# Patient Record
Sex: FEMALE | Race: WHITE | NOT HISPANIC OR LATINO | Employment: FULL TIME | URBAN - METROPOLITAN AREA
[De-identification: names, ages, dates, MRNs, and addresses within clinical notes are randomized per-mention and may not be internally consistent; named-entity substitution may affect disease eponyms.]

---

## 2020-05-01 ENCOUNTER — TELEMEDICINE (OUTPATIENT)
Dept: OBGYN CLINIC | Facility: CLINIC | Age: 30
End: 2020-05-01
Payer: COMMERCIAL

## 2020-05-01 VITALS — WEIGHT: 155 LBS | HEIGHT: 65 IN | BODY MASS INDEX: 25.83 KG/M2

## 2020-05-01 DIAGNOSIS — N97.9 INFERTILITY, FEMALE, SECONDARY: Primary | ICD-10-CM

## 2020-05-01 PROCEDURE — 99213 OFFICE O/P EST LOW 20 MIN: CPT | Performed by: OBSTETRICS & GYNECOLOGY

## 2020-08-12 ENCOUNTER — OFFICE VISIT (OUTPATIENT)
Dept: OBGYN CLINIC | Facility: CLINIC | Age: 30
End: 2020-08-12
Payer: COMMERCIAL

## 2020-08-12 VITALS
BODY MASS INDEX: 26.66 KG/M2 | TEMPERATURE: 97.9 F | HEIGHT: 65 IN | SYSTOLIC BLOOD PRESSURE: 100 MMHG | DIASTOLIC BLOOD PRESSURE: 80 MMHG | WEIGHT: 160 LBS

## 2020-08-12 DIAGNOSIS — N93.0 PCB (POST COITAL BLEEDING): ICD-10-CM

## 2020-08-12 DIAGNOSIS — N92.6 IRREGULAR MENSES: Primary | ICD-10-CM

## 2020-08-12 DIAGNOSIS — N93.9 ABNORMAL UTERINE BLEEDING: ICD-10-CM

## 2020-08-12 DIAGNOSIS — N89.8 VAGINAL DISCHARGE: ICD-10-CM

## 2020-08-12 DIAGNOSIS — R63.5 WEIGHT GAIN: ICD-10-CM

## 2020-08-12 PROCEDURE — 87480 CANDIDA DNA DIR PROBE: CPT | Performed by: OBSTETRICS & GYNECOLOGY

## 2020-08-12 PROCEDURE — 99213 OFFICE O/P EST LOW 20 MIN: CPT | Performed by: OBSTETRICS & GYNECOLOGY

## 2020-08-12 PROCEDURE — 87109 MYCOPLASMA: CPT | Performed by: OBSTETRICS & GYNECOLOGY

## 2020-08-12 PROCEDURE — 87660 TRICHOMONAS VAGIN DIR PROBE: CPT | Performed by: OBSTETRICS & GYNECOLOGY

## 2020-08-12 PROCEDURE — 87510 GARDNER VAG DNA DIR PROBE: CPT | Performed by: OBSTETRICS & GYNECOLOGY

## 2020-08-12 NOTE — PROGRESS NOTES
Assessment/Plan:  39-year-old G2 P 2 female presents complaining of bleeding after intercourse  She is also having some irregular bleeding with her menses  She is presently trying to get pregnant for the past 3 months with timed intercourse  Exam is normal  Vaginitis panel performed today  Check lab work  Follow-up and treat according to results       Diagnoses and all orders for this visit:    Irregular menses  -     CBC; Future  -     Glucose, fasting; Future  -     Hemoglobin A1C; Future  -     TSH, 3rd generation; Future  -     Follicle stimulating hormone; Future  -     Luteinizing hormone; Future  -     Prolactin; Future  -     hCG, quantitative; Future    Weight gain  -     Glucose, fasting; Future  -     Hemoglobin A1C; Future    PCB (post coital bleeding)    Abnormal uterine bleeding          Subjective:      Patient ID: Halie Castellanos is a 31 y o  female  39-year-old  2 para 2 female last menstrual period 2020 presents with postcoital bleeding as well as irregular menses  Patient is presently trying to conceive for the last 3 months  The following portions of the patient's history were reviewed and updated as appropriate: allergies, current medications, past family history, past medical history, past social history, past surgical history and problem list     Review of Systems   Constitutional: Negative for fatigue and fever  Gastrointestinal: Negative for abdominal pain  Genitourinary: Positive for menstrual problem (Irregular bleeding)  Negative for difficulty urinating, dyspareunia ( post coital bleeding), dysuria, frequency, genital sores, pelvic pain, urgency, vaginal bleeding, vaginal discharge and vaginal pain           Objective:      /80 (BP Location: Left arm, Patient Position: Sitting, Cuff Size: Adult)   Temp 97 9 °F (36 6 °C)   Ht 5' 5" (1 651 m)   Wt 72 6 kg (160 lb)   LMP 2020   BMI 26 63 kg/m²          Physical Exam  Vitals signs and nursing note reviewed  Exam conducted with a chaperone present  Constitutional:       Appearance: Normal appearance  Neck:      Musculoskeletal: Neck supple  Genitourinary:     General: Normal vulva  Vagina: Normal  No vaginal discharge  Cervix: Normal       Uterus: Normal        Adnexa: Right adnexa normal and left adnexa normal       Rectum: Normal    Lymphadenopathy:      Lower Body: No right inguinal adenopathy  No left inguinal adenopathy  Skin:     General: Skin is warm and dry  Neurological:      Mental Status: She is alert  Psychiatric:         Mood and Affect: Mood normal          Behavior: Behavior normal          Thought Content:  Thought content normal          Judgment: Judgment normal

## 2020-08-14 LAB
CANDIDA RRNA VAG QL PROBE: NEGATIVE
G VAGINALIS RRNA GENITAL QL PROBE: NEGATIVE
T VAGINALIS RRNA GENITAL QL PROBE: NEGATIVE

## 2020-08-15 ENCOUNTER — LAB (OUTPATIENT)
Dept: LAB | Facility: MEDICAL CENTER | Age: 30
End: 2020-08-15
Payer: COMMERCIAL

## 2020-08-15 DIAGNOSIS — N92.6 IRREGULAR MENSES: ICD-10-CM

## 2020-08-15 DIAGNOSIS — R63.5 WEIGHT GAIN: ICD-10-CM

## 2020-08-15 LAB
B-HCG SERPL-ACNC: <2 MIU/ML
ERYTHROCYTE [DISTWIDTH] IN BLOOD BY AUTOMATED COUNT: 12 % (ref 11.6–15.1)
EST. AVERAGE GLUCOSE BLD GHB EST-MCNC: 91 MG/DL
FSH SERPL-ACNC: 3.4 MIU/ML
GLUCOSE P FAST SERPL-MCNC: 83 MG/DL (ref 65–99)
HBA1C MFR BLD: 4.8 %
HCT VFR BLD AUTO: 39.9 % (ref 34.8–46.1)
HGB BLD-MCNC: 13.3 G/DL (ref 11.5–15.4)
LH SERPL-ACNC: 7 MIU/ML
MCH RBC QN AUTO: 29.8 PG (ref 26.8–34.3)
MCHC RBC AUTO-ENTMCNC: 33.3 G/DL (ref 31.4–37.4)
MCV RBC AUTO: 89 FL (ref 82–98)
PLATELET # BLD AUTO: 400 THOUSANDS/UL (ref 149–390)
PMV BLD AUTO: 9 FL (ref 8.9–12.7)
PROLACTIN SERPL-MCNC: 16.9 NG/ML
RBC # BLD AUTO: 4.47 MILLION/UL (ref 3.81–5.12)
TSH SERPL DL<=0.05 MIU/L-ACNC: 1.83 UIU/ML (ref 0.36–3.74)
WBC # BLD AUTO: 5.21 THOUSAND/UL (ref 4.31–10.16)

## 2020-08-15 PROCEDURE — 84443 ASSAY THYROID STIM HORMONE: CPT

## 2020-08-15 PROCEDURE — 84146 ASSAY OF PROLACTIN: CPT

## 2020-08-15 PROCEDURE — 36415 COLL VENOUS BLD VENIPUNCTURE: CPT

## 2020-08-15 PROCEDURE — 84702 CHORIONIC GONADOTROPIN TEST: CPT

## 2020-08-15 PROCEDURE — 83002 ASSAY OF GONADOTROPIN (LH): CPT

## 2020-08-15 PROCEDURE — 85027 COMPLETE CBC AUTOMATED: CPT

## 2020-08-15 PROCEDURE — 83036 HEMOGLOBIN GLYCOSYLATED A1C: CPT

## 2020-08-15 PROCEDURE — 83001 ASSAY OF GONADOTROPIN (FSH): CPT

## 2020-08-15 PROCEDURE — 82947 ASSAY GLUCOSE BLOOD QUANT: CPT

## 2020-08-18 LAB
M HOMINIS SPEC QL CULT: NEGATIVE
U UREALYTICUM SPEC QL CULT: NEGATIVE

## 2020-10-05 DIAGNOSIS — N91.2 AMENORRHEA: Primary | ICD-10-CM

## 2020-10-06 ENCOUNTER — LAB (OUTPATIENT)
Dept: LAB | Facility: CLINIC | Age: 30
End: 2020-10-06
Payer: COMMERCIAL

## 2020-10-06 DIAGNOSIS — N91.2 AMENORRHEA: ICD-10-CM

## 2020-10-06 LAB — B-HCG SERPL-ACNC: 5055 MIU/ML

## 2020-10-06 PROCEDURE — 36415 COLL VENOUS BLD VENIPUNCTURE: CPT

## 2020-10-06 PROCEDURE — 84144 ASSAY OF PROGESTERONE: CPT

## 2020-10-06 PROCEDURE — 84702 CHORIONIC GONADOTROPIN TEST: CPT

## 2020-10-07 LAB — PROGEST SERPL-MCNC: 17.8 NG/ML

## 2020-10-20 ENCOUNTER — INITIAL PRENATAL (OUTPATIENT)
Dept: OBGYN CLINIC | Facility: CLINIC | Age: 30
End: 2020-10-20
Payer: COMMERCIAL

## 2020-10-20 ENCOUNTER — TELEPHONE (OUTPATIENT)
Dept: PERINATAL CARE | Facility: CLINIC | Age: 30
End: 2020-10-20

## 2020-10-20 VITALS
HEIGHT: 65 IN | TEMPERATURE: 98.2 F | DIASTOLIC BLOOD PRESSURE: 70 MMHG | SYSTOLIC BLOOD PRESSURE: 110 MMHG | HEART RATE: 80 BPM | WEIGHT: 169.8 LBS | RESPIRATION RATE: 16 BRPM | BODY MASS INDEX: 28.29 KG/M2

## 2020-10-20 DIAGNOSIS — Z34.82 ENCOUNTER FOR SUPERVISION OF OTHER NORMAL PREGNANCY, SECOND TRIMESTER: ICD-10-CM

## 2020-10-20 DIAGNOSIS — O24.319 PREGESTATIONAL DIABETES MELLITUS, MODIFIED WHITE CLASS B: ICD-10-CM

## 2020-10-20 DIAGNOSIS — O36.80X0 PREGNANCY WITH UNCERTAIN FETAL VIABILITY, SINGLE OR UNSPECIFIED FETUS: ICD-10-CM

## 2020-10-20 DIAGNOSIS — Z33.1 NORMAL PREGNANCY, INCIDENTAL: Primary | ICD-10-CM

## 2020-10-20 DIAGNOSIS — Z34.81 MULTIGRAVIDA IN FIRST TRIMESTER: ICD-10-CM

## 2020-10-20 PROCEDURE — 99211 OFF/OP EST MAY X REQ PHY/QHP: CPT

## 2020-10-29 ENCOUNTER — HOSPITAL ENCOUNTER (OUTPATIENT)
Dept: RADIOLOGY | Facility: MEDICAL CENTER | Age: 30
Discharge: HOME | End: 2020-10-29
Payer: COMMERCIAL

## 2020-10-29 DIAGNOSIS — O36.80X0 PREGNANCY WITH UNCERTAIN FETAL VIABILITY, SINGLE OR UNSPECIFIED FETUS: ICD-10-CM

## 2020-10-29 PROCEDURE — 76801 OB US < 14 WKS SINGLE FETUS: CPT

## 2020-10-30 ENCOUNTER — TELEMEDICINE (OUTPATIENT)
Dept: PERINATAL CARE | Facility: CLINIC | Age: 30
End: 2020-10-30

## 2020-10-30 DIAGNOSIS — Z31.5 ENCOUNTER FOR PROCREATIVE GENETIC COUNSELING: ICD-10-CM

## 2020-10-30 DIAGNOSIS — O35.2XX0 HEREDITARY DISEASE IN FAMILY POSSIBLY AFFECTING FETUS, AFFECTING MANAGEMENT OF MOTHER IN PREGNANCY, SINGLE OR UNSPECIFIED FETUS: Primary | ICD-10-CM

## 2020-10-30 PROCEDURE — NC001 PR NO CHARGE

## 2020-11-01 ENCOUNTER — LAB (OUTPATIENT)
Dept: LAB | Facility: CLINIC | Age: 30
End: 2020-11-01
Payer: COMMERCIAL

## 2020-11-01 DIAGNOSIS — O24.319 PREGESTATIONAL DIABETES MELLITUS, MODIFIED WHITE CLASS B: ICD-10-CM

## 2020-11-01 DIAGNOSIS — Z33.1 NORMAL PREGNANCY, INCIDENTAL: ICD-10-CM

## 2020-11-01 LAB
ABO GROUP BLD: NORMAL
BASOPHILS # BLD AUTO: 0.03 THOUSANDS/ΜL (ref 0–0.1)
BASOPHILS NFR BLD AUTO: 0 % (ref 0–1)
BILIRUB UR QL STRIP: NEGATIVE
BLD GP AB SCN SERPL QL: NEGATIVE
CLARITY UR: CLEAR
COLOR UR: YELLOW
EOSINOPHIL # BLD AUTO: 0.09 THOUSAND/ΜL (ref 0–0.61)
EOSINOPHIL NFR BLD AUTO: 1 % (ref 0–6)
ERYTHROCYTE [DISTWIDTH] IN BLOOD BY AUTOMATED COUNT: 12.7 % (ref 11.6–15.1)
GLUCOSE 1H P 50 G GLC PO SERPL-MCNC: 79 MG/DL
GLUCOSE UR STRIP-MCNC: NEGATIVE MG/DL
HBV SURFACE AG SER QL: NORMAL
HCT VFR BLD AUTO: 38.2 % (ref 34.8–46.1)
HGB BLD-MCNC: 12.6 G/DL (ref 11.5–15.4)
HGB UR QL STRIP.AUTO: NEGATIVE
IMM GRANULOCYTES # BLD AUTO: 0.04 THOUSAND/UL (ref 0–0.2)
IMM GRANULOCYTES NFR BLD AUTO: 1 % (ref 0–2)
KETONES UR STRIP-MCNC: NEGATIVE MG/DL
LEUKOCYTE ESTERASE UR QL STRIP: NEGATIVE
LYMPHOCYTES # BLD AUTO: 1.32 THOUSANDS/ΜL (ref 0.6–4.47)
LYMPHOCYTES NFR BLD AUTO: 18 % (ref 14–44)
MCH RBC QN AUTO: 29.3 PG (ref 26.8–34.3)
MCHC RBC AUTO-ENTMCNC: 33 G/DL (ref 31.4–37.4)
MCV RBC AUTO: 89 FL (ref 82–98)
MONOCYTES # BLD AUTO: 0.48 THOUSAND/ΜL (ref 0.17–1.22)
MONOCYTES NFR BLD AUTO: 6 % (ref 4–12)
NEUTROPHILS # BLD AUTO: 5.58 THOUSANDS/ΜL (ref 1.85–7.62)
NEUTS SEG NFR BLD AUTO: 74 % (ref 43–75)
NITRITE UR QL STRIP: NEGATIVE
NRBC BLD AUTO-RTO: 0 /100 WBCS
PH UR STRIP.AUTO: 7 [PH]
PLATELET # BLD AUTO: 351 THOUSANDS/UL (ref 149–390)
PMV BLD AUTO: 8.7 FL (ref 8.9–12.7)
PROT UR STRIP-MCNC: NEGATIVE MG/DL
RBC # BLD AUTO: 4.3 MILLION/UL (ref 3.81–5.12)
RH BLD: POSITIVE
RUBV IGG SERPL IA-ACNC: >175 IU/ML
SP GR UR STRIP.AUTO: 1.01 (ref 1–1.03)
SPECIMEN EXPIRATION DATE: NORMAL
UROBILINOGEN UR QL STRIP.AUTO: 0.2 E.U./DL
WBC # BLD AUTO: 7.54 THOUSAND/UL (ref 4.31–10.16)

## 2020-11-01 PROCEDURE — 86787 VARICELLA-ZOSTER ANTIBODY: CPT

## 2020-11-01 PROCEDURE — 82950 GLUCOSE TEST: CPT

## 2020-11-01 PROCEDURE — 36415 COLL VENOUS BLD VENIPUNCTURE: CPT

## 2020-11-01 PROCEDURE — 81003 URINALYSIS AUTO W/O SCOPE: CPT

## 2020-11-01 PROCEDURE — 80081 OBSTETRIC PANEL INC HIV TSTG: CPT

## 2020-11-01 PROCEDURE — 81220 CFTR GENE COM VARIANTS: CPT

## 2020-11-01 PROCEDURE — 83020 HEMOGLOBIN ELECTROPHORESIS: CPT

## 2020-11-01 PROCEDURE — 87086 URINE CULTURE/COLONY COUNT: CPT

## 2020-11-01 PROCEDURE — 81401 MOPATH PROCEDURE LEVEL 2: CPT

## 2020-11-02 ENCOUNTER — INITIAL PRENATAL (OUTPATIENT)
Dept: OBGYN CLINIC | Facility: CLINIC | Age: 30
End: 2020-11-02
Payer: COMMERCIAL

## 2020-11-02 VITALS
BODY MASS INDEX: 28.82 KG/M2 | WEIGHT: 173 LBS | SYSTOLIC BLOOD PRESSURE: 106 MMHG | TEMPERATURE: 97.8 F | HEIGHT: 65 IN | DIASTOLIC BLOOD PRESSURE: 62 MMHG

## 2020-11-02 DIAGNOSIS — Z3A.08 8 WEEKS GESTATION OF PREGNANCY: Primary | ICD-10-CM

## 2020-11-02 DIAGNOSIS — Z3A.09 9 WEEKS GESTATION OF PREGNANCY: ICD-10-CM

## 2020-11-02 LAB
HIV 1+2 AB+HIV1 P24 AG SERPL QL IA: NORMAL
RPR SER QL: NORMAL
SL AMB  POCT GLUCOSE, UA: NORMAL
SL AMB LEUKOCYTE ESTERASE,UA: NORMAL
SL AMB POCT BLOOD,UA: NORMAL
SL AMB POCT NITRITE,UA: NORMAL
SL AMB POCT URINE PROTEIN: NORMAL

## 2020-11-02 PROCEDURE — 87491 CHLMYD TRACH DNA AMP PROBE: CPT | Performed by: OBSTETRICS & GYNECOLOGY

## 2020-11-02 PROCEDURE — 90471 IMMUNIZATION ADMIN: CPT | Performed by: OBSTETRICS & GYNECOLOGY

## 2020-11-02 PROCEDURE — 87591 N.GONORRHOEAE DNA AMP PROB: CPT | Performed by: OBSTETRICS & GYNECOLOGY

## 2020-11-02 PROCEDURE — 90686 IIV4 VACC NO PRSV 0.5 ML IM: CPT | Performed by: OBSTETRICS & GYNECOLOGY

## 2020-11-02 PROCEDURE — PNV: Performed by: OBSTETRICS & GYNECOLOGY

## 2020-11-03 ENCOUNTER — OB ABSTRACT (OUTPATIENT)
Dept: OBGYN CLINIC | Facility: CLINIC | Age: 30
End: 2020-11-03

## 2020-11-03 LAB
BACTERIA UR CULT: NORMAL
C TRACH DNA SPEC QL NAA+PROBE: NEGATIVE
DEPRECATED HGB OTHER BLD-IMP: 0 %
HGB A MFR BLD: 2.2 % (ref 1.8–3.2)
HGB A MFR BLD: 97.8 % (ref 96.4–98.8)
HGB C MFR BLD: 0 %
HGB F MFR BLD: 0 % (ref 0–2)
HGB FRACT BLD-IMP: NORMAL
HGB S BLD QL SOLY: NEGATIVE
HGB S MFR BLD: 0 %
N GONORRHOEA DNA SPEC QL NAA+PROBE: NEGATIVE
VZV IGG SER IA-ACNC: NORMAL

## 2020-11-06 LAB
CLINICAL INFO: NORMAL
ETHNIC BACKGROUND STATED: NORMAL
GENE MUT TESTED BLD/T: NORMAL
GENERAL COMMENTS:: NORMAL
LAB DIRECTOR NAME PROVIDER: NORMAL
REASON FOR REFERRAL (NARRATIVE): NORMAL
REF LAB TEST METHOD: NORMAL
SL AMB DISCLAIMER: NORMAL
SL AMB GENETIC COUNSELOR: NORMAL
SMN1 GENE MUT ANL BLD/T: NORMAL
SPECIMEN SOURCE: NORMAL

## 2020-11-09 LAB
CF COMMENT: NORMAL
CFTR MUT ANL BLD/T: NORMAL

## 2020-11-12 ENCOUNTER — TELEPHONE (OUTPATIENT)
Dept: OBGYN CLINIC | Facility: CLINIC | Age: 30
End: 2020-11-12

## 2020-11-12 ENCOUNTER — TELEPHONE (OUTPATIENT)
Dept: FAMILY MEDICINE CLINIC | Facility: CLINIC | Age: 30
End: 2020-11-12

## 2020-11-12 DIAGNOSIS — Z20.822 EXPOSURE TO COVID-19 VIRUS: ICD-10-CM

## 2020-11-12 DIAGNOSIS — Z20.822 EXPOSURE TO COVID-19 VIRUS: Primary | ICD-10-CM

## 2020-11-12 PROCEDURE — U0003 INFECTIOUS AGENT DETECTION BY NUCLEIC ACID (DNA OR RNA); SEVERE ACUTE RESPIRATORY SYNDROME CORONAVIRUS 2 (SARS-COV-2) (CORONAVIRUS DISEASE [COVID-19]), AMPLIFIED PROBE TECHNIQUE, MAKING USE OF HIGH THROUGHPUT TECHNOLOGIES AS DESCRIBED BY CMS-2020-01-R: HCPCS | Performed by: FAMILY MEDICINE

## 2020-11-14 LAB — SARS-COV-2 RNA SPEC QL NAA+PROBE: NOT DETECTED

## 2020-11-16 ENCOUNTER — TELEPHONE (OUTPATIENT)
Dept: FAMILY MEDICINE CLINIC | Facility: CLINIC | Age: 30
End: 2020-11-16

## 2020-11-23 ENCOUNTER — DOCUMENTATION (OUTPATIENT)
Dept: PERINATAL CARE | Facility: CLINIC | Age: 30
End: 2020-11-23

## 2020-11-28 ENCOUNTER — NURSE TRIAGE (OUTPATIENT)
Dept: OTHER | Facility: OTHER | Age: 30
End: 2020-11-28

## 2020-11-28 ENCOUNTER — TELEPHONE (OUTPATIENT)
Dept: OTHER | Facility: OTHER | Age: 30
End: 2020-11-28

## 2020-11-28 PROCEDURE — 99283 EMERGENCY DEPT VISIT LOW MDM: CPT

## 2020-11-29 ENCOUNTER — HOSPITAL ENCOUNTER (EMERGENCY)
Facility: HOSPITAL | Age: 30
Discharge: HOME | End: 2020-11-29
Attending: EMERGENCY MEDICINE | Admitting: EMERGENCY MEDICINE
Payer: COMMERCIAL

## 2020-11-29 VITALS
DIASTOLIC BLOOD PRESSURE: 60 MMHG | HEART RATE: 95 BPM | RESPIRATION RATE: 16 BRPM | TEMPERATURE: 99.5 F | OXYGEN SATURATION: 97 % | SYSTOLIC BLOOD PRESSURE: 100 MMHG

## 2020-11-29 DIAGNOSIS — R11.2 NAUSEA AND VOMITING: ICD-10-CM

## 2020-11-29 DIAGNOSIS — G43.009 ATYPICAL MIGRAINE: Primary | ICD-10-CM

## 2020-11-29 LAB
ALBUMIN SERPL BCP-MCNC: 3 G/DL (ref 3.5–5)
ALP SERPL-CCNC: 72 U/L (ref 46–116)
ALT SERPL W P-5'-P-CCNC: 27 U/L (ref 12–78)
ANION GAP SERPL CALCULATED.3IONS-SCNC: 5 MMOL/L (ref 4–13)
AST SERPL W P-5'-P-CCNC: 13 U/L (ref 5–45)
BASOPHILS # BLD AUTO: 0.03 THOUSANDS/ΜL (ref 0–0.1)
BASOPHILS NFR BLD AUTO: 0 % (ref 0–1)
BILIRUB SERPL-MCNC: 0.23 MG/DL (ref 0.2–1)
BILIRUB UR QL STRIP: NEGATIVE
BUN SERPL-MCNC: 10 MG/DL (ref 5–25)
CALCIUM ALBUM COR SERPL-MCNC: 9.7 MG/DL (ref 8.3–10.1)
CALCIUM SERPL-MCNC: 8.9 MG/DL (ref 8.3–10.1)
CHLORIDE SERPL-SCNC: 103 MMOL/L (ref 100–108)
CLARITY UR: NORMAL
CO2 SERPL-SCNC: 27 MMOL/L (ref 21–32)
COLOR UR: YELLOW
CREAT SERPL-MCNC: 0.64 MG/DL (ref 0.6–1.3)
EOSINOPHIL # BLD AUTO: 0.05 THOUSAND/ΜL (ref 0–0.61)
EOSINOPHIL NFR BLD AUTO: 0 % (ref 0–6)
ERYTHROCYTE [DISTWIDTH] IN BLOOD BY AUTOMATED COUNT: 12.9 % (ref 11.6–15.1)
GFR SERPL CREATININE-BSD FRML MDRD: 119 ML/MIN/1.73SQ M
GLUCOSE SERPL-MCNC: 114 MG/DL (ref 65–140)
GLUCOSE UR STRIP-MCNC: NEGATIVE MG/DL
HCT VFR BLD AUTO: 37.7 % (ref 34.8–46.1)
HGB BLD-MCNC: 12.6 G/DL (ref 11.5–15.4)
HGB UR QL STRIP.AUTO: NEGATIVE
IMM GRANULOCYTES # BLD AUTO: 0.08 THOUSAND/UL (ref 0–0.2)
IMM GRANULOCYTES NFR BLD AUTO: 1 % (ref 0–2)
KETONES UR STRIP-MCNC: NEGATIVE MG/DL
LEUKOCYTE ESTERASE UR QL STRIP: NEGATIVE
LYMPHOCYTES # BLD AUTO: 1.27 THOUSANDS/ΜL (ref 0.6–4.47)
LYMPHOCYTES NFR BLD AUTO: 10 % (ref 14–44)
MCH RBC QN AUTO: 29.6 PG (ref 26.8–34.3)
MCHC RBC AUTO-ENTMCNC: 33.4 G/DL (ref 31.4–37.4)
MCV RBC AUTO: 89 FL (ref 82–98)
MONOCYTES # BLD AUTO: 0.47 THOUSAND/ΜL (ref 0.17–1.22)
MONOCYTES NFR BLD AUTO: 4 % (ref 4–12)
NEUTROPHILS # BLD AUTO: 10.48 THOUSANDS/ΜL (ref 1.85–7.62)
NEUTS SEG NFR BLD AUTO: 85 % (ref 43–75)
NITRITE UR QL STRIP: NEGATIVE
NRBC BLD AUTO-RTO: 0 /100 WBCS
PH UR STRIP.AUTO: 7.5 [PH]
PLATELET # BLD AUTO: 359 THOUSANDS/UL (ref 149–390)
PMV BLD AUTO: 8.8 FL (ref 8.9–12.7)
POTASSIUM SERPL-SCNC: 3.5 MMOL/L (ref 3.5–5.3)
PROT SERPL-MCNC: 6.6 G/DL (ref 6.4–8.2)
PROT UR STRIP-MCNC: NEGATIVE MG/DL
RBC # BLD AUTO: 4.25 MILLION/UL (ref 3.81–5.12)
SODIUM SERPL-SCNC: 135 MMOL/L (ref 136–145)
SP GR UR STRIP.AUTO: 1.02 (ref 1–1.03)
UROBILINOGEN UR QL STRIP.AUTO: 0.2 E.U./DL
WBC # BLD AUTO: 12.38 THOUSAND/UL (ref 4.31–10.16)

## 2020-11-29 PROCEDURE — 80053 COMPREHEN METABOLIC PANEL: CPT | Performed by: EMERGENCY MEDICINE

## 2020-11-29 PROCEDURE — 85025 COMPLETE CBC W/AUTO DIFF WBC: CPT | Performed by: EMERGENCY MEDICINE

## 2020-11-29 PROCEDURE — 96365 THER/PROPH/DIAG IV INF INIT: CPT

## 2020-11-29 PROCEDURE — 99284 EMERGENCY DEPT VISIT MOD MDM: CPT | Performed by: EMERGENCY MEDICINE

## 2020-11-29 PROCEDURE — 36415 COLL VENOUS BLD VENIPUNCTURE: CPT | Performed by: EMERGENCY MEDICINE

## 2020-11-29 PROCEDURE — 96375 TX/PRO/DX INJ NEW DRUG ADDON: CPT

## 2020-11-29 PROCEDURE — 81003 URINALYSIS AUTO W/O SCOPE: CPT | Performed by: EMERGENCY MEDICINE

## 2020-11-29 PROCEDURE — 87086 URINE CULTURE/COLONY COUNT: CPT | Performed by: EMERGENCY MEDICINE

## 2020-11-29 RX ORDER — DIPHENHYDRAMINE HYDROCHLORIDE 50 MG/ML
25 INJECTION INTRAMUSCULAR; INTRAVENOUS ONCE
Status: COMPLETED | OUTPATIENT
Start: 2020-11-29 | End: 2020-11-29

## 2020-11-29 RX ORDER — MAGNESIUM SULFATE HEPTAHYDRATE 40 MG/ML
2 INJECTION, SOLUTION INTRAVENOUS ONCE
Status: COMPLETED | OUTPATIENT
Start: 2020-11-29 | End: 2020-11-29

## 2020-11-29 RX ORDER — DEXAMETHASONE SODIUM PHOSPHATE 4 MG/ML
10 INJECTION, SOLUTION INTRA-ARTICULAR; INTRALESIONAL; INTRAMUSCULAR; INTRAVENOUS; SOFT TISSUE ONCE
Status: COMPLETED | OUTPATIENT
Start: 2020-11-29 | End: 2020-11-29

## 2020-11-29 RX ORDER — METOCLOPRAMIDE 10 MG/1
10 TABLET ORAL 4 TIMES DAILY
Qty: 28 TABLET | Refills: 0 | Status: SHIPPED | OUTPATIENT
Start: 2020-11-29 | End: 2020-12-06

## 2020-11-29 RX ORDER — METOCLOPRAMIDE HYDROCHLORIDE 5 MG/ML
10 INJECTION INTRAMUSCULAR; INTRAVENOUS ONCE
Status: COMPLETED | OUTPATIENT
Start: 2020-11-29 | End: 2020-11-29

## 2020-11-29 RX ADMIN — DIPHENHYDRAMINE HYDROCHLORIDE 25 MG: 50 INJECTION, SOLUTION INTRAMUSCULAR; INTRAVENOUS at 00:56

## 2020-11-29 RX ADMIN — DEXAMETHASONE SODIUM PHOSPHATE 10 MG: 4 INJECTION INTRA-ARTICULAR; INTRALESIONAL; INTRAMUSCULAR; INTRAVENOUS; SOFT TISSUE at 03:33

## 2020-11-29 RX ADMIN — MAGNESIUM SULFATE HEPTAHYDRATE 2 G: 40 INJECTION, SOLUTION INTRAVENOUS at 00:57

## 2020-11-29 RX ADMIN — SODIUM CHLORIDE 1000 ML: 0.9 INJECTION, SOLUTION INTRAVENOUS at 00:57

## 2020-11-29 RX ADMIN — METOCLOPRAMIDE HYDROCHLORIDE 10 MG: 5 INJECTION INTRAMUSCULAR; INTRAVENOUS at 00:56

## 2020-11-30 ENCOUNTER — TELEPHONE (OUTPATIENT)
Dept: PERINATAL CARE | Facility: CLINIC | Age: 30
End: 2020-11-30

## 2020-11-30 LAB — BACTERIA UR CULT: NORMAL

## 2020-12-03 ENCOUNTER — ROUTINE PRENATAL (OUTPATIENT)
Dept: OBGYN CLINIC | Facility: CLINIC | Age: 30
End: 2020-12-03

## 2020-12-03 VITALS
SYSTOLIC BLOOD PRESSURE: 112 MMHG | TEMPERATURE: 98 F | DIASTOLIC BLOOD PRESSURE: 62 MMHG | HEIGHT: 65 IN | BODY MASS INDEX: 29.32 KG/M2 | WEIGHT: 176 LBS

## 2020-12-03 DIAGNOSIS — Z3A.13 13 WEEKS GESTATION OF PREGNANCY: Primary | ICD-10-CM

## 2020-12-03 LAB
SL AMB  POCT GLUCOSE, UA: NORMAL
SL AMB LEUKOCYTE ESTERASE,UA: NORMAL
SL AMB POCT BLOOD,UA: NORMAL
SL AMB POCT NITRITE,UA: NORMAL
SL AMB POCT PH,UA: NORMAL
SL AMB POCT SPECIFIC GRAVITY,UA: NORMAL

## 2020-12-03 PROCEDURE — PNV: Performed by: OBSTETRICS & GYNECOLOGY

## 2020-12-04 ENCOUNTER — TELEPHONE (OUTPATIENT)
Dept: PERINATAL CARE | Facility: CLINIC | Age: 30
End: 2020-12-04

## 2020-12-07 ENCOUNTER — ROUTINE PRENATAL (OUTPATIENT)
Dept: PERINATAL CARE | Facility: OTHER | Age: 30
End: 2020-12-07
Payer: COMMERCIAL

## 2020-12-07 VITALS
WEIGHT: 177.2 LBS | BODY MASS INDEX: 29.52 KG/M2 | SYSTOLIC BLOOD PRESSURE: 107 MMHG | HEART RATE: 84 BPM | DIASTOLIC BLOOD PRESSURE: 75 MMHG | HEIGHT: 65 IN

## 2020-12-07 DIAGNOSIS — Z36.82 NUCHAL TRANSLUCENCY OF FETUS ON PRENATAL ULTRASOUND: ICD-10-CM

## 2020-12-07 DIAGNOSIS — Z3A.13 13 WEEKS GESTATION OF PREGNANCY: ICD-10-CM

## 2020-12-07 DIAGNOSIS — O09.299 H/O GESTATIONAL DIABETES IN PRIOR PREGNANCY, CURRENTLY PREGNANT: Primary | ICD-10-CM

## 2020-12-07 DIAGNOSIS — Z86.32 H/O GESTATIONAL DIABETES IN PRIOR PREGNANCY, CURRENTLY PREGNANT: Primary | ICD-10-CM

## 2020-12-07 PROCEDURE — 76801 OB US < 14 WKS SINGLE FETUS: CPT | Performed by: OBSTETRICS & GYNECOLOGY

## 2020-12-07 PROCEDURE — 76813 OB US NUCHAL MEAS 1 GEST: CPT | Performed by: OBSTETRICS & GYNECOLOGY

## 2020-12-07 PROCEDURE — 99213 OFFICE O/P EST LOW 20 MIN: CPT | Performed by: OBSTETRICS & GYNECOLOGY

## 2020-12-08 ENCOUNTER — TRANSCRIBE ORDERS (OUTPATIENT)
Dept: LAB | Facility: CLINIC | Age: 30
End: 2020-12-08

## 2020-12-08 ENCOUNTER — LAB (OUTPATIENT)
Dept: LAB | Facility: CLINIC | Age: 30
End: 2020-12-08
Payer: COMMERCIAL

## 2020-12-08 DIAGNOSIS — Z33.1 PREGNANT STATE, INCIDENTAL: Primary | ICD-10-CM

## 2020-12-08 DIAGNOSIS — Z36.9 UNSPECIFIED ANTENATAL SCREENING: ICD-10-CM

## 2020-12-08 DIAGNOSIS — Z33.1 PREGNANT STATE, INCIDENTAL: ICD-10-CM

## 2020-12-08 PROCEDURE — 36415 COLL VENOUS BLD VENIPUNCTURE: CPT

## 2020-12-09 LAB — MISCELLANEOUS LAB TEST RESULT: NORMAL

## 2020-12-10 ENCOUNTER — TELEPHONE (OUTPATIENT)
Dept: PERINATAL CARE | Facility: CLINIC | Age: 30
End: 2020-12-10

## 2020-12-11 PROBLEM — Z86.32 H/O GESTATIONAL DIABETES IN PRIOR PREGNANCY, CURRENTLY PREGNANT: Status: ACTIVE | Noted: 2020-12-11

## 2020-12-11 PROBLEM — O09.299 H/O GESTATIONAL DIABETES IN PRIOR PREGNANCY, CURRENTLY PREGNANT: Status: ACTIVE | Noted: 2020-12-11

## 2020-12-14 ENCOUNTER — OB ABSTRACT (OUTPATIENT)
Dept: OBGYN CLINIC | Facility: CLINIC | Age: 30
End: 2020-12-14

## 2020-12-24 ENCOUNTER — ROUTINE PRENATAL (OUTPATIENT)
Dept: OBGYN CLINIC | Facility: CLINIC | Age: 30
End: 2020-12-24

## 2020-12-24 VITALS
BODY MASS INDEX: 29.74 KG/M2 | SYSTOLIC BLOOD PRESSURE: 120 MMHG | DIASTOLIC BLOOD PRESSURE: 82 MMHG | HEIGHT: 65 IN | WEIGHT: 178.5 LBS

## 2020-12-24 DIAGNOSIS — Z3A.16 16 WEEKS GESTATION OF PREGNANCY: Primary | ICD-10-CM

## 2020-12-24 LAB
SL AMB  POCT GLUCOSE, UA: NORMAL
SL AMB LEUKOCYTE ESTERASE,UA: NORMAL
SL AMB POCT NITRITE,UA: NORMAL
SL AMB POCT PH,UA: NORMAL

## 2020-12-24 PROCEDURE — PNV: Performed by: OBSTETRICS & GYNECOLOGY

## 2020-12-24 RX ORDER — METOCLOPRAMIDE 10 MG/1
5 TABLET ORAL AS NEEDED
COMMUNITY

## 2020-12-27 ENCOUNTER — OFFICE VISIT (OUTPATIENT)
Dept: URGENT CARE | Facility: MEDICAL CENTER | Age: 30
End: 2020-12-27
Payer: COMMERCIAL

## 2020-12-27 ENCOUNTER — NURSE TRIAGE (OUTPATIENT)
Dept: OTHER | Facility: OTHER | Age: 30
End: 2020-12-27

## 2020-12-27 VITALS
BODY MASS INDEX: 29.16 KG/M2 | HEIGHT: 65 IN | WEIGHT: 175 LBS | OXYGEN SATURATION: 97 % | TEMPERATURE: 97.7 F | HEART RATE: 94 BPM

## 2020-12-27 DIAGNOSIS — B34.9 ACUTE VIRAL SYNDROME: Primary | ICD-10-CM

## 2020-12-27 PROCEDURE — G0382 LEV 3 HOSP TYPE B ED VISIT: HCPCS | Performed by: PHYSICIAN ASSISTANT

## 2020-12-27 PROCEDURE — 87637 SARSCOV2&INF A&B&RSV AMP PRB: CPT | Performed by: PHYSICIAN ASSISTANT

## 2020-12-28 ENCOUNTER — TELEPHONE (OUTPATIENT)
Dept: OBGYN CLINIC | Facility: CLINIC | Age: 30
End: 2020-12-28

## 2020-12-29 LAB
FLUAV RNA NPH QL NAA+PROBE: NOT DETECTED
FLUBV RNA NPH QL NAA+PROBE: NOT DETECTED
RSV RNA NPH QL NAA+PROBE: NOT DETECTED
SARS-COV-2 RNA NPH QL NAA+PROBE: DETECTED

## 2020-12-30 ENCOUNTER — TELEPHONE (OUTPATIENT)
Dept: OBGYN CLINIC | Facility: CLINIC | Age: 30
End: 2020-12-30

## 2020-12-30 ENCOUNTER — TELEMEDICINE (OUTPATIENT)
Dept: FAMILY MEDICINE CLINIC | Facility: CLINIC | Age: 30
End: 2020-12-30
Payer: COMMERCIAL

## 2020-12-30 VITALS — HEIGHT: 65 IN | WEIGHT: 175 LBS | TEMPERATURE: 99.8 F | BODY MASS INDEX: 29.16 KG/M2

## 2020-12-30 DIAGNOSIS — Z3A.17 17 WEEKS GESTATION OF PREGNANCY: ICD-10-CM

## 2020-12-30 DIAGNOSIS — U07.1 LAB TEST POSITIVE FOR DETECTION OF COVID-19 VIRUS: Primary | ICD-10-CM

## 2020-12-30 PROCEDURE — 99213 OFFICE O/P EST LOW 20 MIN: CPT | Performed by: NURSE PRACTITIONER

## 2020-12-30 PROCEDURE — 1036F TOBACCO NON-USER: CPT | Performed by: NURSE PRACTITIONER

## 2020-12-30 PROCEDURE — 3008F BODY MASS INDEX DOCD: CPT | Performed by: NURSE PRACTITIONER

## 2020-12-30 PROCEDURE — 3725F SCREEN DEPRESSION PERFORMED: CPT | Performed by: NURSE PRACTITIONER

## 2020-12-31 ENCOUNTER — TELEPHONE (OUTPATIENT)
Dept: URGENT CARE | Facility: MEDICAL CENTER | Age: 30
End: 2020-12-31

## 2021-01-04 ENCOUNTER — TELEPHONE (OUTPATIENT)
Dept: OBGYN CLINIC | Facility: CLINIC | Age: 31
End: 2021-01-04

## 2021-01-04 DIAGNOSIS — O21.9 NAUSEA/VOMITING IN PREGNANCY: Primary | ICD-10-CM

## 2021-01-04 RX ORDER — ONDANSETRON 4 MG/1
4 TABLET, FILM COATED ORAL EVERY 8 HOURS PRN
Qty: 20 TABLET | Refills: 0 | Status: SHIPPED | OUTPATIENT
Start: 2021-01-04

## 2021-01-04 NOTE — TELEPHONE ENCOUNTER
17 6 weeks gestation, she was positive for covid, and has lost 6 pounds, she is feeling better but shill has nausea, Reglan given her diarrhea, so she would like Zofran called in ,please advise, she also has a follow up scheduled with rebekah and with us

## 2021-01-06 ENCOUNTER — OB ABSTRACT (OUTPATIENT)
Dept: OBGYN CLINIC | Facility: CLINIC | Age: 31
End: 2021-01-06

## 2021-01-07 ENCOUNTER — OB ABSTRACT (OUTPATIENT)
Dept: OBGYN CLINIC | Facility: CLINIC | Age: 31
End: 2021-01-07

## 2021-01-13 ENCOUNTER — OB ABSTRACT (OUTPATIENT)
Dept: OBGYN CLINIC | Facility: CLINIC | Age: 31
End: 2021-01-13

## 2021-01-18 ENCOUNTER — TELEPHONE (OUTPATIENT)
Dept: PERINATAL CARE | Facility: OTHER | Age: 31
End: 2021-01-18

## 2021-01-18 ENCOUNTER — ROUTINE PRENATAL (OUTPATIENT)
Dept: OBGYN CLINIC | Facility: CLINIC | Age: 31
End: 2021-01-18

## 2021-01-18 VITALS
BODY MASS INDEX: 30.32 KG/M2 | WEIGHT: 182 LBS | DIASTOLIC BLOOD PRESSURE: 76 MMHG | HEIGHT: 65 IN | SYSTOLIC BLOOD PRESSURE: 118 MMHG

## 2021-01-18 DIAGNOSIS — B37.3 CANDIDA VAGINITIS: ICD-10-CM

## 2021-01-18 DIAGNOSIS — Z3A.19 19 WEEKS GESTATION OF PREGNANCY: Primary | ICD-10-CM

## 2021-01-18 LAB
SL AMB  POCT GLUCOSE, UA: NORMAL
SL AMB LEUKOCYTE ESTERASE,UA: NORMAL
SL AMB POCT NITRITE,UA: NORMAL
SL AMB POCT URINE PROTEIN: NORMAL

## 2021-01-18 PROCEDURE — PNV: Performed by: OBSTETRICS & GYNECOLOGY

## 2021-01-18 NOTE — PROGRESS NOTES
Patient seen evaluated denies any vaginal bleeding ruptures of membrane or contraction, having good fetal movement, migraine well controlled with Tylenol and Reglan, patient was diagnosed with COVID earlier in this pregnancy, already received her flu vaccine, has appointment with M tomorrow, complaining of vaginal itching and irritation, speculum apply mild irritation on the external genitalia with milky white vaginal discharge will treat for yeast infection medication will be called to patient's pharmacy,  precaution given, fetal kick count discuss, sign and symptom of preeclampsia review, and to return to office in 3w  Aware she need to have 2nd part of quad screen done as soon as possible

## 2021-01-18 NOTE — TELEPHONE ENCOUNTER
Attempted to reach patient by phone and left voicemail to confirm appointment for MFM ultrasound  1 support person ( must be over the age of 15) may accompany you for your appointment  If you or your support person have traveled outside the state in the past 2 weeks, please call and notify our office today #[phone]  You and your support person must wear a mask ,covering nose and mouth,during your entire visit  To minimize your exposure in our waiting room, please call our office prior to entering the building  Check in and rooming questions will be done via phone  We will give you directions when to enter for your appointment  [place]: [phone]    IF you are not feeling well- cough, fever, shortness of breath or any flu like symptoms, contact your primary care physician or [address] [place] [address]  If you are awaiting COVID 19 test results please call and reschedule your appointment    Any questions with these instructions please call Maternal Fetal Medicine nurse line today @ # [phone]

## 2021-01-19 ENCOUNTER — ROUTINE PRENATAL (OUTPATIENT)
Dept: PERINATAL CARE | Facility: OTHER | Age: 31
End: 2021-01-19
Payer: COMMERCIAL

## 2021-01-19 VITALS
WEIGHT: 180.78 LBS | DIASTOLIC BLOOD PRESSURE: 69 MMHG | BODY MASS INDEX: 30.12 KG/M2 | HEART RATE: 90 BPM | HEIGHT: 65 IN | SYSTOLIC BLOOD PRESSURE: 112 MMHG

## 2021-01-19 DIAGNOSIS — Z36.86 ENCOUNTER FOR ANTENATAL SCREENING FOR CERVICAL LENGTH: ICD-10-CM

## 2021-01-19 DIAGNOSIS — O98.512 COVID-19 AFFECTING PREGNANCY IN SECOND TRIMESTER: Primary | ICD-10-CM

## 2021-01-19 DIAGNOSIS — O09.299 H/O GESTATIONAL DIABETES IN PRIOR PREGNANCY, CURRENTLY PREGNANT: ICD-10-CM

## 2021-01-19 DIAGNOSIS — U07.1 COVID-19 AFFECTING PREGNANCY IN SECOND TRIMESTER: Primary | ICD-10-CM

## 2021-01-19 DIAGNOSIS — Z3A.20 20 WEEKS GESTATION OF PREGNANCY: ICD-10-CM

## 2021-01-19 DIAGNOSIS — Z86.32 H/O GESTATIONAL DIABETES IN PRIOR PREGNANCY, CURRENTLY PREGNANT: ICD-10-CM

## 2021-01-19 PROCEDURE — 99213 OFFICE O/P EST LOW 20 MIN: CPT | Performed by: OBSTETRICS & GYNECOLOGY

## 2021-01-19 PROCEDURE — 1036F TOBACCO NON-USER: CPT | Performed by: OBSTETRICS & GYNECOLOGY

## 2021-01-19 PROCEDURE — 76817 TRANSVAGINAL US OBSTETRIC: CPT | Performed by: OBSTETRICS & GYNECOLOGY

## 2021-01-19 PROCEDURE — 76811 OB US DETAILED SNGL FETUS: CPT | Performed by: OBSTETRICS & GYNECOLOGY

## 2021-01-19 PROCEDURE — 3008F BODY MASS INDEX DOCD: CPT | Performed by: OBSTETRICS & GYNECOLOGY

## 2021-01-19 NOTE — PROGRESS NOTES
Jr Chester  has no complaints today  She reports fetal movements and does not report any vaginal bleeding or signs of labor  Her recently completed fetal testing revealed a normal NIPT  She did not complete her MSAFP as she developed the COVID infection at the time of her test     Problem list:  1  History of gestational diabetes-her initial Glucola was normal  2  History of COVID infection in this pregnancy    Ultrasound findings: The ultrasound today shows normal interval fetal growth and fluid, normal cervical length, and no malformations were detected  The fetal anatomy was not completed today secondary to fetal position  Pregnancy ultrasound has limitations and is unable to detect all forms of fetal congenital abnormalities  Specific counseling was provided on the following problems:  1  With the history of a COVID infection diagnosed in pregnancy I encourage participation in at least one of the following registries that are compiling information on outcomes of COVID infections in pregnancies  This will help myself and other provider better  future patients who develop Covid in pregnancy  So far we have not found this virus to cause any malformations or any sign of growth restriction in the pregnancies in which this infection has resolved  [email or web address]  [email or web address]    [email or web address]  2  We discussed the CDCs summary on the COVID vaccination in pregnancy and breast feeding found in the following link:    [employer] be  Based on my review of this information,  I would encourage vaccination to prevent her from developing a severe COVID virus infection and the resultant maternal and fetal complications that can arise if she develops a severe infection  Maternal vaccination may also supply antibodies to prevent a  infection         Follow up recommended:   Tests ordered today:  None  Tests recommended through her ob office in the future:  None  Future ultrasounds ordered today:  Recommend a follow-up ultrasound at 32 weeks for growth and missed anatomy due to her history of a COVID infection in pregnancy    Pre visit time reviewing her records   10 minutes  Face to face time 10 minutes  Post visit time on documentation of note, updating her problem list, adding orders and prescriptions 10 minutes  Procedures that were completed today were charged separately     The level of decision making was low level complexity  cMkay Kelly MD

## 2021-01-19 NOTE — PROGRESS NOTES
Ultrasound Probe Disinfection    A transvaginal ultrasound was performed  Prior to use, disinfection was performed with High Level Disinfection Process (Trophon)  Probe serial number A1: [id number] was used        Huber Marvin  01/19/21  9:36 AM

## 2021-02-09 ENCOUNTER — ROUTINE PRENATAL (OUTPATIENT)
Dept: OBGYN CLINIC | Facility: CLINIC | Age: 31
End: 2021-02-09

## 2021-02-09 VITALS
SYSTOLIC BLOOD PRESSURE: 114 MMHG | HEIGHT: 65 IN | WEIGHT: 188 LBS | BODY MASS INDEX: 31.32 KG/M2 | DIASTOLIC BLOOD PRESSURE: 62 MMHG | TEMPERATURE: 98.2 F

## 2021-02-09 DIAGNOSIS — U07.1 COVID-19 AFFECTING PREGNANCY IN SECOND TRIMESTER: ICD-10-CM

## 2021-02-09 DIAGNOSIS — O98.512 COVID-19 AFFECTING PREGNANCY IN SECOND TRIMESTER: ICD-10-CM

## 2021-02-09 DIAGNOSIS — Z86.32 H/O GESTATIONAL DIABETES IN PRIOR PREGNANCY, CURRENTLY PREGNANT: ICD-10-CM

## 2021-02-09 DIAGNOSIS — Z3A.23 23 WEEKS GESTATION OF PREGNANCY: Primary | ICD-10-CM

## 2021-02-09 DIAGNOSIS — O09.299 H/O GESTATIONAL DIABETES IN PRIOR PREGNANCY, CURRENTLY PREGNANT: ICD-10-CM

## 2021-02-09 PROCEDURE — PNV: Performed by: OBSTETRICS & GYNECOLOGY

## 2021-02-10 NOTE — PROGRESS NOTES
Seen evaluated no complaint denies any VB ROM or contx   Having good FM denies any HA blurry vision or epigastric pain   management of GERD  In pregnancy discussed  Anatomical survey at 21 w as per MFM discussion  With patient no need fro AFP sec to anatomical survey was normal    precaution given to continue PNV daily and to rto in 3w

## 2021-03-03 ENCOUNTER — OB ABSTRACT (OUTPATIENT)
Dept: OBGYN CLINIC | Facility: CLINIC | Age: 31
End: 2021-03-03

## 2021-03-03 ENCOUNTER — ROUTINE PRENATAL (OUTPATIENT)
Dept: OBGYN CLINIC | Facility: CLINIC | Age: 31
End: 2021-03-03

## 2021-03-03 VITALS
TEMPERATURE: 97.8 F | WEIGHT: 195 LBS | BODY MASS INDEX: 32.49 KG/M2 | DIASTOLIC BLOOD PRESSURE: 62 MMHG | HEIGHT: 65 IN | SYSTOLIC BLOOD PRESSURE: 112 MMHG

## 2021-03-03 DIAGNOSIS — K21.9 GASTROESOPHAGEAL REFLUX DISEASE WITHOUT ESOPHAGITIS: ICD-10-CM

## 2021-03-03 DIAGNOSIS — Z3A.26 26 WEEKS GESTATION OF PREGNANCY: Primary | ICD-10-CM

## 2021-03-03 DIAGNOSIS — O09.299 H/O GESTATIONAL DIABETES IN PRIOR PREGNANCY, CURRENTLY PREGNANT: ICD-10-CM

## 2021-03-03 DIAGNOSIS — Z86.32 H/O GESTATIONAL DIABETES IN PRIOR PREGNANCY, CURRENTLY PREGNANT: ICD-10-CM

## 2021-03-03 DIAGNOSIS — O98.512 COVID-19 AFFECTING PREGNANCY IN SECOND TRIMESTER: ICD-10-CM

## 2021-03-03 DIAGNOSIS — U07.1 COVID-19 AFFECTING PREGNANCY IN SECOND TRIMESTER: ICD-10-CM

## 2021-03-03 PROCEDURE — PNV: Performed by: OBSTETRICS & GYNECOLOGY

## 2021-03-03 RX ORDER — FAMOTIDINE 20 MG/1
20 TABLET, FILM COATED ORAL 2 TIMES DAILY
Qty: 30 TABLET | Refills: 2 | Status: SHIPPED | OUTPATIENT
Start: 2021-03-03

## 2021-03-03 NOTE — PROGRESS NOTES
Pt c/o severe heart burn, swelling of feet, denies any VB or LOF, having good fetal movements, occasional [place] h[place] contractions

## 2021-03-03 NOTE — PROGRESS NOTES
Patient seen evaluated denies any vaginal bleeding ruptures of membrane or contraction, having good fetal movement, denies any headache blurry vision or epigastric pain, complaining of acid reflux not responding to Tums and diet modification, diet modification again explained and discussed with patient in details, will consider start Pepcid safety of medication in pregnancy discussed with patient constipation management in pregnancy discussed as well  precaution given, fetal kick count discuss, sign and symptom of preeclampsia review, to return to office in 2 weeks

## 2021-03-06 ENCOUNTER — LAB (OUTPATIENT)
Dept: LAB | Facility: CLINIC | Age: 31
End: 2021-03-06
Payer: COMMERCIAL

## 2021-03-06 DIAGNOSIS — O09.299 H/O GESTATIONAL DIABETES IN PRIOR PREGNANCY, CURRENTLY PREGNANT: ICD-10-CM

## 2021-03-06 DIAGNOSIS — Z3A.26 26 WEEKS GESTATION OF PREGNANCY: ICD-10-CM

## 2021-03-06 DIAGNOSIS — Z86.32 H/O GESTATIONAL DIABETES IN PRIOR PREGNANCY, CURRENTLY PREGNANT: ICD-10-CM

## 2021-03-06 LAB
BASOPHILS # BLD AUTO: 0.03 THOUSANDS/ΜL (ref 0–0.1)
BASOPHILS NFR BLD AUTO: 0 % (ref 0–1)
EOSINOPHIL # BLD AUTO: 0.12 THOUSAND/ΜL (ref 0–0.61)
EOSINOPHIL NFR BLD AUTO: 1 % (ref 0–6)
ERYTHROCYTE [DISTWIDTH] IN BLOOD BY AUTOMATED COUNT: 13.2 % (ref 11.6–15.1)
GLUCOSE 1H P 50 G GLC PO SERPL-MCNC: 132 MG/DL
HCT VFR BLD AUTO: 33.8 % (ref 34.8–46.1)
HGB BLD-MCNC: 11.2 G/DL (ref 11.5–15.4)
IMM GRANULOCYTES # BLD AUTO: 0.14 THOUSAND/UL (ref 0–0.2)
IMM GRANULOCYTES NFR BLD AUTO: 1 % (ref 0–2)
LYMPHOCYTES # BLD AUTO: 1.2 THOUSANDS/ΜL (ref 0.6–4.47)
LYMPHOCYTES NFR BLD AUTO: 12 % (ref 14–44)
MCH RBC QN AUTO: 29.7 PG (ref 26.8–34.3)
MCHC RBC AUTO-ENTMCNC: 33.1 G/DL (ref 31.4–37.4)
MCV RBC AUTO: 90 FL (ref 82–98)
MONOCYTES # BLD AUTO: 0.51 THOUSAND/ΜL (ref 0.17–1.22)
MONOCYTES NFR BLD AUTO: 5 % (ref 4–12)
NEUTROPHILS # BLD AUTO: 7.76 THOUSANDS/ΜL (ref 1.85–7.62)
NEUTS SEG NFR BLD AUTO: 81 % (ref 43–75)
NRBC BLD AUTO-RTO: 0 /100 WBCS
PLATELET # BLD AUTO: 296 THOUSANDS/UL (ref 149–390)
PMV BLD AUTO: 9 FL (ref 8.9–12.7)
RBC # BLD AUTO: 3.77 MILLION/UL (ref 3.81–5.12)
WBC # BLD AUTO: 9.76 THOUSAND/UL (ref 4.31–10.16)

## 2021-03-06 PROCEDURE — 82950 GLUCOSE TEST: CPT

## 2021-03-06 PROCEDURE — 36415 COLL VENOUS BLD VENIPUNCTURE: CPT

## 2021-03-06 PROCEDURE — 86592 SYPHILIS TEST NON-TREP QUAL: CPT

## 2021-03-06 PROCEDURE — 85025 COMPLETE CBC W/AUTO DIFF WBC: CPT

## 2021-03-08 LAB — RPR SER QL: NORMAL

## 2021-03-16 ENCOUNTER — ROUTINE PRENATAL (OUTPATIENT)
Dept: OBGYN CLINIC | Facility: CLINIC | Age: 31
End: 2021-03-16
Payer: COMMERCIAL

## 2021-03-16 VITALS
HEIGHT: 65 IN | DIASTOLIC BLOOD PRESSURE: 68 MMHG | SYSTOLIC BLOOD PRESSURE: 112 MMHG | BODY MASS INDEX: 33.62 KG/M2 | WEIGHT: 201.8 LBS

## 2021-03-16 DIAGNOSIS — Z3A.28 28 WEEKS GESTATION OF PREGNANCY: Primary | ICD-10-CM

## 2021-03-16 PROCEDURE — 90715 TDAP VACCINE 7 YRS/> IM: CPT | Performed by: OBSTETRICS & GYNECOLOGY

## 2021-03-16 PROCEDURE — 90471 IMMUNIZATION ADMIN: CPT | Performed by: OBSTETRICS & GYNECOLOGY

## 2021-03-16 PROCEDURE — PNV: Performed by: OBSTETRICS & GYNECOLOGY

## 2021-04-01 ENCOUNTER — ROUTINE PRENATAL (OUTPATIENT)
Dept: OBGYN CLINIC | Facility: CLINIC | Age: 31
End: 2021-04-01

## 2021-04-01 VITALS
BODY MASS INDEX: 34.02 KG/M2 | WEIGHT: 204.2 LBS | SYSTOLIC BLOOD PRESSURE: 122 MMHG | HEIGHT: 65 IN | DIASTOLIC BLOOD PRESSURE: 76 MMHG

## 2021-04-01 DIAGNOSIS — Z36.9 ENCOUNTER FOR ANTENATAL SCREENING: ICD-10-CM

## 2021-04-01 DIAGNOSIS — Z34.83 ENCOUNTER FOR SUPERVISION OF NORMAL PREGNANCY IN MULTIGRAVIDA IN THIRD TRIMESTER: Primary | ICD-10-CM

## 2021-04-01 PROCEDURE — 3008F BODY MASS INDEX DOCD: CPT | Performed by: OBSTETRICS & GYNECOLOGY

## 2021-04-01 PROCEDURE — PNV: Performed by: PHYSICIAN ASSISTANT

## 2021-04-01 NOTE — PATIENT INSTRUCTIONS
Stressed kick counts  Discussed labor precautions  F/u with MFM as planned  Continue Tums and Pepcid

## 2021-04-01 NOTE — PROGRESS NOTES
Assessment     Pregnancy 30 and 2/7 weeks     Plan     28-week labs reviewed, normal  Stressed kick counts  Follow up in 2 Weeks  Slip for repeat 1 hr GTT at next visit  F/u with MFM as planned  Discussed labor precautions  Continue Tums and Pepcid  Ashley Holman Leslee is a 27 y o  female being seen today for her obstetrical visit  She is at 30w2d gestation  Patient reports heartburn, no bleeding, no leaking and occasional contractions  Fetal movement: normal   Patient states she is doing well overall  Currently taking Tums and Pepcid for reflux  Gave up caffeine this week as it was making symptoms worse  Has f/u with MFM in 2 weeks  Experiencing occasional BH contractions  Has had HA since yesterday but feels it's due to lack of caffeine and poor sleep  Took Tylenol today  Patient denies n/v, abdominal pain, and swelling  Urine dip negative  Menstrual History:  OB History        4    Para   2    Term   2            AB   1    Living   2       SAB   1    TAB   0    Ectopic   0    Multiple   0    Live Births   2                Menarche age: N/A  Patient's last menstrual period was 2020 (exact date)  The following portions of the patient's history were reviewed and updated as appropriate: allergies, current medications, past family history, past medical history, past social history, past surgical history and problem list     Review of Systems  Pertinent items are noted in HPI       Objective     /76 (BP Location: Left arm, Patient Position: Sitting, Cuff Size: Standard)   Ht 5' 5" (1 651 m)   Wt 92 6 kg (204 lb 3 2 oz)   LMP 2020 (Exact Date)   BMI 33 98 kg/m²   FHT:  143 BPM   Uterine Size: size equals dates   Presentation: unsure

## 2021-04-13 ENCOUNTER — ULTRASOUND (OUTPATIENT)
Dept: PERINATAL CARE | Facility: OTHER | Age: 31
End: 2021-04-13
Payer: COMMERCIAL

## 2021-04-13 ENCOUNTER — ROUTINE PRENATAL (OUTPATIENT)
Dept: OBGYN CLINIC | Facility: CLINIC | Age: 31
End: 2021-04-13

## 2021-04-13 VITALS
DIASTOLIC BLOOD PRESSURE: 75 MMHG | WEIGHT: 207.45 LBS | BODY MASS INDEX: 34.56 KG/M2 | SYSTOLIC BLOOD PRESSURE: 115 MMHG | HEIGHT: 65 IN | HEART RATE: 102 BPM

## 2021-04-13 VITALS
HEIGHT: 65 IN | SYSTOLIC BLOOD PRESSURE: 118 MMHG | DIASTOLIC BLOOD PRESSURE: 74 MMHG | WEIGHT: 207.8 LBS | BODY MASS INDEX: 34.62 KG/M2

## 2021-04-13 DIAGNOSIS — Z3A.32 32 WEEKS GESTATION OF PREGNANCY: Primary | ICD-10-CM

## 2021-04-13 DIAGNOSIS — Z3A.32 32 WEEKS GESTATION OF PREGNANCY: ICD-10-CM

## 2021-04-13 DIAGNOSIS — O36.60X0 EXCESSIVE FETAL GROWTH AFFECTING MANAGEMENT OF PREGNANCY, ANTEPARTUM, SINGLE OR UNSPECIFIED FETUS: Primary | ICD-10-CM

## 2021-04-13 DIAGNOSIS — Z86.32 H/O GESTATIONAL DIABETES IN PRIOR PREGNANCY, CURRENTLY PREGNANT: ICD-10-CM

## 2021-04-13 DIAGNOSIS — U07.1 COVID-19 AFFECTING PREGNANCY IN SECOND TRIMESTER: ICD-10-CM

## 2021-04-13 DIAGNOSIS — O98.512 COVID-19 AFFECTING PREGNANCY IN SECOND TRIMESTER: ICD-10-CM

## 2021-04-13 DIAGNOSIS — O09.299 H/O GESTATIONAL DIABETES IN PRIOR PREGNANCY, CURRENTLY PREGNANT: ICD-10-CM

## 2021-04-13 PROCEDURE — 76816 OB US FOLLOW-UP PER FETUS: CPT | Performed by: OBSTETRICS & GYNECOLOGY

## 2021-04-13 PROCEDURE — PNV: Performed by: OBSTETRICS & GYNECOLOGY

## 2021-04-13 PROCEDURE — 1036F TOBACCO NON-USER: CPT | Performed by: OBSTETRICS & GYNECOLOGY

## 2021-04-13 PROCEDURE — 99213 OFFICE O/P EST LOW 20 MIN: CPT | Performed by: OBSTETRICS & GYNECOLOGY

## 2021-04-13 NOTE — LETTER
April 13, 2021     [facility] [id number]    Patient: Debi Ruiz   YOB: 1990   Date of Visit: 4/13/2021       Dear Dr Sheeba Loja:    Thank you for referring Debi Ruiz to me for evaluation  Below are my notes for this consultation  If you have questions, please do not hesitate to call me  I look forward to following your patient along with you  Sincerely,        Marylen Bryant, MD        CC: No Recipients  Marylen Bryant, MD  4/13/2021  9:56 AM  Sign when Signing Visit  Debi Ruiz has no complaints today  She reports regular fetal movements and does not report any problems  She is here today at 32w0d for an ultrasound for  Fetal growth  Problem list:  1  History of gestational diabetes-her recent Glucola was 80 and she reports she is having this repeated this weekend because of her prior history of GDM  2  History of COVID infection in this pregnancy and she declines the COVID vaccine  Ultrasound findings: The ultrasound today shows  A fetus is abdomen is in the greater than 97th percentile  CRICKET is normal  Review of the missed anatomy shows no malformations although review of the ductus today is still limited secondary to fetal position  There is a midline anterior fibroid measuring 2 68 x 1 85 x 2 10 centimeters  It is intramural in near the edge of the placenta  At this size and at its location it will likely not cause any issues in pregnancy  Pregnancy ultrasound has limitations and is unable to detect all forms of fetal congenital abnormalities  The inaccuracy in the EFW can be off by 1 lb either way in the third trimester  Follow up recommended:   1  Overall the estimated fetal weight is in the 88th percentile  Her largest baby delivered weighing 8 pounds 3 ounces with a 20 minutes 2nd stage by her recollection  2  Recommend a follow-up ultrasound in 6 weeks for a better estimated fetal weight at time of delivery    3  We reviewed the risks and benefits of the COVID infection in pregnancy and the vaccine to prevent it and she is still airing on the side of declining the COVID vaccine  Pre visit time reviewing her records   5 minutes  Face to face time 5 minutes  Post visit time on documentation of note, updating her problem list, adding orders and prescriptions 5 minutes  Procedures that were completed today were charged separately  The level of decision making was straight forward      Gabbie Wilson MD

## 2021-04-13 NOTE — PROGRESS NOTES
Patient seen evaluated denies any vaginal bleeding ruptures of membrane or contraction, having good fetal movement, denies any headache blurry vision or epigastric pain, ultrasound results discussed with patient, patient concern about LGA, patient desired tubal ligation NK she has to have a  for any reason ,  precaution given fetal kick count discuss sign and symptom of preeclampsia review to return to office in 2 weeks

## 2021-04-13 NOTE — PROGRESS NOTES
April Campbell has no complaints today  She reports regular fetal movements and does not report any problems  She is here today at 32w0d for an ultrasound for  Fetal growth  Problem list:  1  History of gestational diabetes-her recent Glucola was 80 and she reports she is having this repeated this weekend because of her prior history of GDM  2  History of COVID infection in this pregnancy and she declines the COVID vaccine  Ultrasound findings: The ultrasound today shows  A fetus is abdomen is in the greater than 97th percentile  CRICKET is normal  Review of the missed anatomy shows no malformations although review of the ductus today is still limited secondary to fetal position  There is a midline anterior fibroid measuring 2 68 x 1 85 x 2 10 centimeters  It is intramural in near the edge of the placenta  At this size and at its location it will likely not cause any issues in pregnancy  Pregnancy ultrasound has limitations and is unable to detect all forms of fetal congenital abnormalities  The inaccuracy in the EFW can be off by 1 lb either way in the third trimester  Follow up recommended:   1  Overall the estimated fetal weight is in the 88th percentile  Her largest baby delivered weighing 8 pounds 3 ounces with a 20 minutes 2nd stage by her recollection  2  Recommend a follow-up ultrasound in 6 weeks for a better estimated fetal weight at time of delivery  3  We reviewed the risks and benefits of the COVID infection in pregnancy and the vaccine to prevent it and she is still airing on the side of declining the COVID vaccine  Pre visit time reviewing her records   5 minutes  Face to face time 5 minutes  Post visit time on documentation of note, updating her problem list, adding orders and prescriptions 5 minutes  Procedures that were completed today were charged separately  The level of decision making was straight forward      Bessie Boyce MD

## 2021-04-29 ENCOUNTER — ROUTINE PRENATAL (OUTPATIENT)
Dept: OBGYN CLINIC | Facility: CLINIC | Age: 31
End: 2021-04-29

## 2021-04-29 VITALS
SYSTOLIC BLOOD PRESSURE: 122 MMHG | HEIGHT: 65 IN | BODY MASS INDEX: 34.92 KG/M2 | DIASTOLIC BLOOD PRESSURE: 74 MMHG | WEIGHT: 209.6 LBS

## 2021-04-29 DIAGNOSIS — Z3A.34 34 WEEKS GESTATION OF PREGNANCY: ICD-10-CM

## 2021-04-29 DIAGNOSIS — Z36.9 ENCOUNTER FOR ANTENATAL SCREENING: ICD-10-CM

## 2021-04-29 DIAGNOSIS — Z34.83 ENCOUNTER FOR SUPERVISION OF NORMAL PREGNANCY IN MULTIGRAVIDA IN THIRD TRIMESTER: Primary | ICD-10-CM

## 2021-04-29 PROCEDURE — 3008F BODY MASS INDEX DOCD: CPT | Performed by: OBSTETRICS & GYNECOLOGY

## 2021-04-29 PROCEDURE — PNV: Performed by: PHYSICIAN ASSISTANT

## 2021-04-29 NOTE — PROGRESS NOTES
Assessment     Pregnancy 34 and 2/7 weeks     Plan     28-week labs reviewed, normal  Stressed kick counts  Follow up in 2 Weeks  GBS next visit  Order for repeat 1 hr GTT entered  F/u with MFM as planned  Try compression socks  Make sure to ambulate frequently at work  Stay well hydrated  Continue Pepcid and Tums  Will refer to women's health PT for pelvic floor evaluation post partum  Discussed labor precautions  Discussed s/s of pre-eclampsia and when to call  Subjective     Estephania Ammon is a 27 y o  female being seen today for her obstetrical visit  She is at 34w2d gestation  Patient reports heartburn, no bleeding, no cramping, no leaking and occasional contractions  Fetal movement: normal   Patient states she is doing well overall  U/S on  with MFM showed growth in 88th %ile; has f/u in a month  Needs repeat 1 hr GTT  Complains of moderate to severe swelling in her feet over the last 3 days  Notes that she is sedentary at work  Taking Pepcid and Tums for reflux  Experiences intermittent BH contractions  Had urine leakage earlier this week  Denies HA, n/v, and abdominal pain  Urine dip negative  Menstrual History:  OB History        4    Para   2    Term   2            AB   1    Living   2       SAB   1    TAB   0    Ectopic   0    Multiple   0    Live Births   2                Menarche age: N/A  Patient's last menstrual period was 2020 (exact date)  The following portions of the patient's history were reviewed and updated as appropriate: allergies, current medications, past family history, past medical history, past social history, past surgical history and problem list     Review of Systems  Pertinent items are noted in HPI       Objective     /74 (BP Location: Left arm, Patient Position: Sitting, Cuff Size: Standard)   Ht 5' 5" (1 651 m)   Wt 95 1 kg (209 lb 9 6 oz)   LMP 2020 (Exact Date)   BMI 34 88 kg/m²   FHT:  137 BPM   Uterine Size: size equals dates   Presentation: cephalic

## 2021-04-29 NOTE — PATIENT INSTRUCTIONS
Stressed kick counts  Go for 1 hr GTT  F/u with MFM as planned  Try compression socks  Ambulate frequently at work  Stay well hydrated  Continue Pepcid and Tums  Discussed labor precautions  Discussed s/s of pre-eclampsia and when to call

## 2021-05-01 ENCOUNTER — TRANSCRIBE ORDERS (OUTPATIENT)
Dept: LAB | Facility: CLINIC | Age: 31
End: 2021-05-01

## 2021-05-01 ENCOUNTER — APPOINTMENT (OUTPATIENT)
Dept: LAB | Facility: CLINIC | Age: 31
End: 2021-05-01
Payer: COMMERCIAL

## 2021-05-01 DIAGNOSIS — Z36.9 ENCOUNTER FOR ANTENATAL SCREENING: ICD-10-CM

## 2021-05-01 LAB — GLUCOSE 1H P 50 G GLC PO SERPL-MCNC: 157 MG/DL (ref 40–134)

## 2021-05-01 PROCEDURE — 36415 COLL VENOUS BLD VENIPUNCTURE: CPT

## 2021-05-01 PROCEDURE — 82950 GLUCOSE TEST: CPT

## 2021-05-04 ENCOUNTER — TELEPHONE (OUTPATIENT)
Dept: OBGYN CLINIC | Facility: CLINIC | Age: 31
End: 2021-05-04

## 2021-05-04 DIAGNOSIS — R73.09 ELEVATED GLUCOSE TOLERANCE TEST: Primary | ICD-10-CM

## 2021-05-04 NOTE — TELEPHONE ENCOUNTER
Spoke with patient regarding 1 hr GTT - failed at 157  Will perform 3 hr; order entered  Call with further questions

## 2021-05-05 ENCOUNTER — ROUTINE PRENATAL (OUTPATIENT)
Dept: OBGYN CLINIC | Facility: CLINIC | Age: 31
End: 2021-05-05

## 2021-05-05 ENCOUNTER — TELEPHONE (OUTPATIENT)
Dept: OBGYN CLINIC | Facility: CLINIC | Age: 31
End: 2021-05-05

## 2021-05-05 VITALS
SYSTOLIC BLOOD PRESSURE: 120 MMHG | HEIGHT: 65 IN | BODY MASS INDEX: 35.16 KG/M2 | DIASTOLIC BLOOD PRESSURE: 76 MMHG | WEIGHT: 211 LBS

## 2021-05-05 DIAGNOSIS — Z3A.35 35 WEEKS GESTATION OF PREGNANCY: Primary | ICD-10-CM

## 2021-05-05 PROCEDURE — PNV: Performed by: OBSTETRICS & GYNECOLOGY

## 2021-05-05 NOTE — PROGRESS NOTES
Patient seen evaluated presents to the office today secondary to episode of vaginal spotting happen at 3:00 a m  this morning pink vaginal discharge denies any heavy vaginal bleeding denies any sexual intercourse prior denies any vaginal itching or odor or discharge denies any urgency frequency or dysuria denies any diarrhea or constipation patient is having good fetal movement denies any leakage of fluid denies any contraction   speculum apply white vaginal discharge noted cervical exam performed cervix fingertip thick posterior bedside ultrasound performed vertex presentation noted fetal movement noted fetal heart rate was 140 beats per minute reassurance given to return to office in 1 week for her next schedule appointment    precaution given fetal kick count discuss

## 2021-05-05 NOTE — TELEPHONE ENCOUNTER
35w1d gestation, woke up at 3 am with bleeding pink in color , no sexual intercourse,  + fetal movements, has had vaginal pressure for a week,  [place] contractions , spoke with   Dr Chayo Larry made

## 2021-05-08 ENCOUNTER — APPOINTMENT (OUTPATIENT)
Dept: LAB | Facility: CLINIC | Age: 31
End: 2021-05-08
Payer: COMMERCIAL

## 2021-05-08 DIAGNOSIS — R73.09 ELEVATED GLUCOSE TOLERANCE TEST: ICD-10-CM

## 2021-05-08 LAB
GLUCOSE 2H P 100 G GLC PO SERPL-MCNC: 203 MG/DL (ref 65–154)
GLUCOSE P FAST SERPL-MCNC: 148 MG/DL (ref 65–99)

## 2021-05-08 PROCEDURE — 82952 GTT-ADDED SAMPLES: CPT

## 2021-05-08 PROCEDURE — 82951 GLUCOSE TOLERANCE TEST (GTT): CPT

## 2021-05-08 PROCEDURE — 36415 COLL VENOUS BLD VENIPUNCTURE: CPT

## 2021-05-10 ENCOUNTER — TELEPHONE (OUTPATIENT)
Dept: OBGYN CLINIC | Facility: CLINIC | Age: 31
End: 2021-05-10

## 2021-05-10 DIAGNOSIS — O24.410 DIET CONTROLLED GESTATIONAL DIABETES MELLITUS (GDM) IN THIRD TRIMESTER: Primary | ICD-10-CM

## 2021-05-10 NOTE — TELEPHONE ENCOUNTER
LEFT A VOICE MESSAGE ON THE PHYSICIAN  Line to return the call patient needs an earlier appt for GDM ed

## 2021-05-10 NOTE — TELEPHONE ENCOUNTER
She need to see M asap FOR BLOOD SUGAR CONTROL   PLEASE CALL AND SEE IF THEY CAN GIVE HER EARLY APPOINMENT

## 2021-05-10 NOTE — TELEPHONE ENCOUNTER
SPOKE WITH THE PATIENT SHE SAW HER BLOOD WORK AND HAD 2 ABNORMAL VALUES FOR HER 3 HR GTT, she has concerns appointment  availability since she is 35 weeks at [facility] for GDM education, referral was put in for the Education and she will call them to day to schedule

## 2021-05-11 ENCOUNTER — ROUTINE PRENATAL (OUTPATIENT)
Dept: OBGYN CLINIC | Facility: CLINIC | Age: 31
End: 2021-05-11

## 2021-05-11 ENCOUNTER — TELEPHONE (OUTPATIENT)
Dept: PERINATAL CARE | Facility: CLINIC | Age: 31
End: 2021-05-11

## 2021-05-11 ENCOUNTER — TELEPHONE (OUTPATIENT)
Dept: OBGYN CLINIC | Facility: CLINIC | Age: 31
End: 2021-05-11

## 2021-05-11 VITALS
WEIGHT: 217.4 LBS | BODY MASS INDEX: 36.22 KG/M2 | SYSTOLIC BLOOD PRESSURE: 122 MMHG | HEIGHT: 65 IN | DIASTOLIC BLOOD PRESSURE: 78 MMHG

## 2021-05-11 DIAGNOSIS — Z3A.36 36 WEEKS GESTATION OF PREGNANCY: Primary | ICD-10-CM

## 2021-05-11 PROCEDURE — PNV: Performed by: OBSTETRICS & GYNECOLOGY

## 2021-05-11 PROCEDURE — 87150 DNA/RNA AMPLIFIED PROBE: CPT | Performed by: OBSTETRICS & GYNECOLOGY

## 2021-05-11 NOTE — PROGRESS NOTES
Patient seen evaluated denies any vaginal bleeding ruptures membrane or contraction, having good fetal movement, denies any headache blurry vision or epigastric pain, failed her 3 hour GTT like Glucola test, appointment scheduled with nutrition next week, MFM called to schedule appointment for follow-up on growth as well this week, GBS done today, cervical exam performed cervix fingertip thick posterior,  precaution given, fetal kick count discuss, sign and symptom of preeclampsia review, to return to office in 1 week

## 2021-05-11 NOTE — TELEPHONE ENCOUNTER
Spoke with MFM and follow up ultrasound was scheduled for 5/17/2021 @ 1:45 in their [place] office,dina is aware

## 2021-05-13 LAB — GP B STREP DNA SPEC QL NAA+PROBE: NEGATIVE

## 2021-05-17 ENCOUNTER — ULTRASOUND (OUTPATIENT)
Dept: PERINATAL CARE | Facility: OTHER | Age: 31
End: 2021-05-17
Payer: COMMERCIAL

## 2021-05-17 VITALS
BODY MASS INDEX: 35.89 KG/M2 | HEART RATE: 91 BPM | DIASTOLIC BLOOD PRESSURE: 78 MMHG | SYSTOLIC BLOOD PRESSURE: 118 MMHG | WEIGHT: 215.4 LBS | HEIGHT: 65 IN

## 2021-05-17 DIAGNOSIS — Z86.32 H/O GESTATIONAL DIABETES IN PRIOR PREGNANCY, CURRENTLY PREGNANT: ICD-10-CM

## 2021-05-17 DIAGNOSIS — Z3A.36 36 WEEKS GESTATION OF PREGNANCY: ICD-10-CM

## 2021-05-17 DIAGNOSIS — O24.419 GESTATIONAL DIABETES MELLITUS (GDM) IN THIRD TRIMESTER, GESTATIONAL DIABETES METHOD OF CONTROL UNSPECIFIED: Primary | ICD-10-CM

## 2021-05-17 DIAGNOSIS — O09.299 H/O GESTATIONAL DIABETES IN PRIOR PREGNANCY, CURRENTLY PREGNANT: ICD-10-CM

## 2021-05-17 PROCEDURE — 59025 FETAL NON-STRESS TEST: CPT | Performed by: OBSTETRICS & GYNECOLOGY

## 2021-05-17 PROCEDURE — 76816 OB US FOLLOW-UP PER FETUS: CPT | Performed by: OBSTETRICS & GYNECOLOGY

## 2021-05-17 PROCEDURE — 99214 OFFICE O/P EST MOD 30 MIN: CPT | Performed by: OBSTETRICS & GYNECOLOGY

## 2021-05-17 PROCEDURE — 1036F TOBACCO NON-USER: CPT | Performed by: OBSTETRICS & GYNECOLOGY

## 2021-05-17 NOTE — LETTER
May 17, 2021     [facility], [address] [facility] Jaquan Cortes    Patient: Almas Michael   YOB: 1990   Date of Visit: 5/17/2021       Dear Dr Nerissa Bazan:    Thank you for referring Almas Michael to me for evaluation  Below are my notes for this consultation  If you have questions, please do not hesitate to call me  I look forward to following your patient along with you  Sincerely,        Abi Barrientos MD        CC: No Recipients  Abi Barrientos MD  5/17/2021  7:57 PM  Sign when Signing Visit  Almas Michael has no complaints today  She reports regular fetal movements and does not report any problems  She is here today at 36w6d for an ultrasound for  Fetal growth  She has a history of gestational diabetes  Her early diabetes  Was normal at 132  A repeat diabetes screen done approximately 2 weeks ago was 157 and so she started at 3:00 a m  glucose tolerance testing which her fasting was 148  She has her 1st appointment with diabetes Education tomorrow along with her OB visit  Ultrasound findings: The ultrasound today shows a fetus who is abdomen is in the 90th percentile  The cranial measurements are in the less than 5th percentile however measurements are more difficult in the late 3rd trimester and may be less accurate  CRICKET is normal       NST is reactive  Pregnancy ultrasound has limitations and is unable to detect all forms of fetal congenital abnormalities  The inaccuracy in the EFW can be off by 1 lb either way in the third trimester  Specific counseling was provided on the following problems:  1  Newly found gestational diabetic with her 1st meeting tomorrow with a fasting blood sugar of 148 suggesting that she will need to be started on insulin for adequate control of her blood sugars  Since insulin will need to be titrated  Slowly it is unlikely that we will control her blood sugars before delivery    This puts her increased risk for stillbirth and the fetal size increases her risk of developing shoulder dystocia  She is aware that her baby has a higher risk of developing hypoglycemia after birth, difficulty breathing from lung immaturity, shoulder dystocia,  and jaundice ect from poorly controlled diabetes prior to delivery  Follow up recommended:   1  For poorly controlled diabetes in pregnancy which is unlikely to be controlled by starting insulin this late in pregnancy and a large fetal abdomen, recommend consideration of delivery at 38 weeks  2    Till delivery recommend twice weekly NSTs and weekly CRICKET and daily fetal kick counts    Pre visit time reviewing her records   5 minutes  Face to face time 15 minutes  Post visit time on documentation of note, updating her problem list, adding orders and prescriptions 5 minutes  Procedures that were completed today were charged separately  The level of decision making was moderate complexity      Karley Michaels MD

## 2021-05-17 NOTE — PROGRESS NOTES
Lisa Stokes has no complaints today  She reports regular fetal movements and does not report any problems  She is here today at 36w6d for an ultrasound for  Fetal growth  She has a history of gestational diabetes  Her early diabetes screen was normal at 132  A repeat diabetes screen done approximately 2 weeks ago was 157 and so she started a 3hr  glucose tolerance testing which her fasting was 148  She has her 1st appointment with diabetes Education tomorrow along with her OB visit  Ultrasound findings: The ultrasound today shows a fetus who is abdomen is in the 90th percentile  The cranial measurements are in the less than 5th percentile however measurements are more difficult in the late 3rd trimester and may be less accurate  CRICKET is normal       NST is reactive  Pregnancy ultrasound has limitations and is unable to detect all forms of fetal congenital abnormalities  The inaccuracy in the EFW can be off by 1 lb either way in the third trimester  Specific counseling was provided on the following problems:  1  Newly found gestational diabetic with her 1st meeting tomorrow with diabetes education  A fasting blood sugar of 148 suggests that she will need to be started on insulin for adequate control of her blood sugars  Since insulin will need to be titrated slowly it is unlikely that we will control her blood sugars before delivery  This puts her increased risk for stillbirth and the fetal size increases her risk of developing shoulder dystocia  She is aware that her baby has a higher risk of developing hypoglycemia after birth, difficulty breathing from lung immaturity, shoulder dystocia,  and jaundice ect from poorly controlled diabetes prior to delivery  Follow up recommended:   1  For poorly controlled diabetes in pregnancy which is unlikely to be controlled by starting insulin this late in pregnancy and a large fetal abdomen, recommend consideration of delivery at 38 weeks    2    Till delivery recommend twice weekly NSTs and weekly CRICKET and daily fetal kick counts      Pre visit time reviewing her records   5 minutes  Face to face time 15 minutes  Post visit time on documentation of note, updating her problem list, adding orders and prescriptions 5 minutes  Procedures that were completed today were charged separately  The level of decision making was moderate complexity      Lindajo Alpers, MD

## 2021-05-17 NOTE — LETTER
NST sleeve cover sheet    Patient name: Debi Ruiz  : 1990  MRN: [id number]    BERTHA: Estimated Date of Delivery: 21    Obstetrician: ______Mann_________    Reason(s) for testing:  __________________________________________      Testing frequency:    ___ 2x/wk  ___ 1x/wk  ___ Dopplers  ___ BPP?       Last growth scan: __________________________________________

## 2021-05-17 NOTE — PROGRESS NOTES
Non-Stress Testing:    Non-Stress test, equipment, procedure, and expected outcomes reviewed  Reviewed fetal kick counts and when to call OB  Mervin Whitlock Verified patient understanding of fetal kick counts with teach back method  Patient reports feeling daily fetal movements  Patient has no questions or concerns

## 2021-05-18 ENCOUNTER — DOCUMENTATION (OUTPATIENT)
Dept: PERINATAL CARE | Facility: CLINIC | Age: 31
End: 2021-05-18

## 2021-05-18 ENCOUNTER — TELEPHONE (OUTPATIENT)
Dept: OBGYN CLINIC | Facility: CLINIC | Age: 31
End: 2021-05-18

## 2021-05-18 ENCOUNTER — TELEMEDICINE (OUTPATIENT)
Dept: PERINATAL CARE | Facility: CLINIC | Age: 31
End: 2021-05-18
Payer: COMMERCIAL

## 2021-05-18 ENCOUNTER — ROUTINE PRENATAL (OUTPATIENT)
Dept: OBGYN CLINIC | Facility: CLINIC | Age: 31
End: 2021-05-18

## 2021-05-18 VITALS
SYSTOLIC BLOOD PRESSURE: 122 MMHG | WEIGHT: 216 LBS | HEIGHT: 65 IN | DIASTOLIC BLOOD PRESSURE: 82 MMHG | BODY MASS INDEX: 35.99 KG/M2

## 2021-05-18 DIAGNOSIS — E66.3 OVERWEIGHT WITH BODY MASS INDEX (BMI) OF 27 TO 27.9 IN ADULT: ICD-10-CM

## 2021-05-18 DIAGNOSIS — Z3A.37 37 WEEKS GESTATION OF PREGNANCY: Primary | ICD-10-CM

## 2021-05-18 DIAGNOSIS — O26.03 EXCESS WEIGHT GAIN IN PREGNANCY, THIRD TRIMESTER: ICD-10-CM

## 2021-05-18 DIAGNOSIS — O24.410 DIET CONTROLLED GESTATIONAL DIABETES MELLITUS (GDM) IN THIRD TRIMESTER: Primary | ICD-10-CM

## 2021-05-18 PROCEDURE — G0108 DIAB MANAGE TRN  PER INDIV: HCPCS | Performed by: DIETITIAN, REGISTERED

## 2021-05-18 PROCEDURE — PNV: Performed by: OBSTETRICS & GYNECOLOGY

## 2021-05-18 NOTE — PROGRESS NOTES
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 yes    Personal & Family History:  Personal history of diabetes? yes Gestational diabetes  Family members with diabetes: Maternal Grandfather    Pregnancy History:  How many total pregnancies have you had? 4  How many children do you have? 2  Have you had a baby weighing larger than 9 lbs? no  Are you having swelling or fluid retention? Yes; discussed how to limit high sodium foods  Have you been placed on bedrest? no    Physical Activity:  Do you exercise? yes  Type of Exercise: Walking  Frequency of Exercise: Other Occasionally only    Nutrition Questionnaire: How many meals do you eat daily? 3  List times of meals: Breakfast: 7 AM/ Lunch: 12:30 PM/ Dinner: 6-8 PM depending on her children's softball schedule  How many snacks do you eat daily? Other a lot  List times of snacks: Other mostly in the afternoon  What type of diet are you following at home? Regular  Do you have special or ethnic dietary preferences? no  Do you have any food allergies or intolerances? yes Intolerance to most fruit, catsup & tomatoes due to reflux  Needs to be up for 2 hours after eating before going to bed  Learning preferences: How do you learn best? Hands on  How do you rate your health? Good  Who is your primary support person? Spouse  How do you cope with stress? Art or takes a bath  Do you have any cultural or Confucianist beliefs we should be aware of? no  How do you feel the diabetes diagnosis will affect the rest of your pregnancy?  Had it before  Do you receive WIC or food stamps? no

## 2021-05-18 NOTE — PROGRESS NOTES
Virtual Regular Visit      Assessment/Plan:    Problem List Items Addressed This Visit        Endocrine    Gestational diabetes mellitus (GDM) in third trimester               Reason for visit is   Chief Complaint   Patient presents with    Virtual Regular Visit        Encounter provider Sekou Jarquin    Provider located at [address] [place] [address]  [address] [facility] [id number] [phone]      Recent Visits  Date Type Provider Dept   05/11/21 Telephone [address]    Showing recent visits within past 7 days and meeting all other requirements     Future Appointments  No visits were found meeting these conditions  Showing future appointments within next 150 days and meeting all other requirements        The patient was identified by name and date of birth  Eboni Milton was informed that this is a telemedicine visit and that the visit is being conducted through [address] and patient was informed that this is a secure, HIPAA-compliant platform  She agrees to proceed     My office door was closed  No one else was in the room  She acknowledged consent and understanding of privacy and security of the video platform  The patient has agreed to participate and understands they can discontinue the visit at any time  Patient is aware this is a billable service  Subjective  Eboni Milton is a 27 y o  female pregnant patinet        HPI     Past Medical History:   Diagnosis Date    Anemia     no meds     Gestational diabetes     gestational last pregnancy and prediabetes 2018- Resolved with 40 lb weight loss    Gestational diabetes mellitus (GDM) in third trimester 5/17/2021    IBS (irritable bowel syndrome)     Migraine     occular migranes no meds     Numerous skin moles     pre cancerous moles removed     Urinary tract infection     Varicella     had a child        Past Surgical History:   Procedure Laterality Date    COLONOSCOPY 2012    negative    MOLE REMOVAL      SKIN TAG REMOVAL      removal of pre-cancerous moles x 2       Current Outpatient Medications   Medication Sig Dispense Refill    famotidine (PEPCID) 20 mg tablet Take 1 tablet (20 mg total) by mouth 2 (two) times a day 30 tablet 2    metoclopramide (REGLAN) 10 mg tablet Take 5 mg by mouth as needed       ondansetron (ZOFRAN) 4 mg tablet Take 1 tablet (4 mg total) by mouth every 8 (eight) hours as needed for nausea or vomiting 20 tablet 0    Prenatal Vit-Fe Fumarate-FA (PRENATAL VITAMIN PO) Take by mouth       No current facility-administered medications for this visit  Allergies   Allergen Reactions    Dye [Iodinated Diagnostic Agents] Hives      Iv dye        Review of Systems    Video Exam    There were no vitals filed for this visit  Physical Exam --not performed  Time spent with the patient: 60 minutes  VIRTUAL VISIT DISCLAIMER    Bess Allan acknowledges that she has consented to an online visit or consultation  She understands that the online visit is based solely on information provided by her, and that, in the absence of a face-to-face physical evaluation by the physician, the diagnosis she receives is both limited and provisional in terms of accuracy and completeness  This is not intended to replace a full medical face-to-face evaluation by the physician  Bess Allan understands and accepts these terms  DATE:  21   RE:   Bess Allan  :  1990  BERTHA: Estimated Date of Delivery: 21     EGA:  37w0d    Dear Montrell Emerson[de-identified]    Your patient, Bess Allan was seen for diabetes and pregnancy counseling and management  As the patient is greater than 36 weeks gestation, her primary treatment for gestational diabetes will be blood glucose monitoring, no concentrated sweets diet and insulin therapy, if needed        The following was reviewed with the patient:     Blood glucose testing during pregnancy: fasting (goal 60 mg/dl to 90 mg/dl), two hour post prandial (goal less than 120 mg/dl)  Patient was instructed on use of One-Touch Verio blood glucose meter  Prescriptions were sent to her pharmacy   No concentrated sweets diet  Consume protein with every meal and snack   Complications of hyperglycemia during pregnancy including  hypoglycemia and stillbirth   Maternal-fetal surveillance for diabetes care during pregnancy   Has gained 51 lbs with this pregnancy     The following discharged plan was established:      [facility] four [facility] at [facility]   Non-stress testing two times weekly and CRICKET testing weekly   HbA1c every 6 to 8 weeks (ordered)   Call my office Tuesday, 21 for evaluation of blood glucose levels   Induction scheduled for Thursday, 21    Diabetes Self Management Support Plan outside of ongoing care: Spouse/Family    Thank you for the opportunity to participate in the care of this patient  I can be reached at [phone] should you have any questions  Time spent with patient 3:30-4:25 PM; time spent face to face counseling greater than 50% of the appointment      Sincerely,     Pito Montez  Diabetes Educator  Diabetes and Pregnancy Program

## 2021-05-18 NOTE — PROGRESS NOTES
Patient seen evaluated denies any vaginal bleeding ruptures of membrane or contraction, having good fetal movement, denies any headache blurry vision or epigastric pain, abnormal 3 hour Glucola results discussed seen by Maternal-Fetal Medicine and they recommend induction of labor by 38 weeks secondary to GDM A2 has appointment tomorrow with nutrition and diabetes management to start on insulin pelvic exam performed today cervix 2/50/2 L&D notified induction of labor May 27, 2021 on-call doctor notified, labor instruction given, fetal kick count discuss, sign and symptom of preeclampsia review, to return to office in 1 week

## 2021-05-19 RX ORDER — LANCETS 33 GAUGE
EACH MISCELLANEOUS
Qty: 100 EACH | Refills: 0 | Status: SHIPPED | OUTPATIENT
Start: 2021-05-19 | End: 2021-06-08

## 2021-05-19 RX ORDER — BLOOD-GLUCOSE METER
EACH MISCELLANEOUS
Qty: 1 KIT | Refills: 0 | Status: SHIPPED | OUTPATIENT
Start: 2021-05-19 | End: 2021-06-08

## 2021-05-19 RX ORDER — BLOOD SUGAR DIAGNOSTIC
STRIP MISCELLANEOUS
Qty: 100 STRIP | Refills: 0 | Status: SHIPPED | OUTPATIENT
Start: 2021-05-19 | End: 2021-05-28 | Stop reason: HOSPADM

## 2021-05-20 ENCOUNTER — ROUTINE PRENATAL (OUTPATIENT)
Dept: PERINATAL CARE | Facility: OTHER | Age: 31
End: 2021-05-20
Payer: COMMERCIAL

## 2021-05-20 VITALS
BODY MASS INDEX: 36.18 KG/M2 | WEIGHT: 217.15 LBS | DIASTOLIC BLOOD PRESSURE: 79 MMHG | SYSTOLIC BLOOD PRESSURE: 117 MMHG | HEIGHT: 65 IN | HEART RATE: 79 BPM

## 2021-05-20 DIAGNOSIS — O24.419 GESTATIONAL DIABETES MELLITUS (GDM) IN THIRD TRIMESTER, GESTATIONAL DIABETES METHOD OF CONTROL UNSPECIFIED: ICD-10-CM

## 2021-05-20 DIAGNOSIS — Z3A.37 37 WEEKS GESTATION OF PREGNANCY: Primary | ICD-10-CM

## 2021-05-20 PROBLEM — O99.210 OBESITY AFFECTING PREGNANCY: Status: ACTIVE | Noted: 2021-05-20

## 2021-05-20 PROCEDURE — 59025 FETAL NON-STRESS TEST: CPT | Performed by: OBSTETRICS & GYNECOLOGY

## 2021-05-20 NOTE — PATIENT INSTRUCTIONS
Kick Counts in Pregnancy   AMBULATORY CARE:   Kick counts  measure how much your baby is moving in your womb  A kick from your baby can be felt as a twist, turn, swish, roll, or jab  It is common to feel your baby kicking at 26 to 28 weeks of pregnancy  You may feel your baby kick as early as 20 weeks of pregnancy  You may want to start counting at 28 weeks  Contact your healthcare provider immediately if:   · You feel a change in the number of kicks or movements of your baby  · You feel fewer than 10 kicks within 2 hours  · You have questions or concerns about your baby's movements  Why measure kick counts:  Your baby's movement may provide information about your baby's health  He or she may move less, or not at all, if there are problems  Your baby may move less if he or she is not getting enough oxygen or nutrition from the placenta  Do not smoke while you are pregnant  Smoking decreases the amount of oxygen that gets to your baby  Talk to your healthcare provider if you need help to quit smoking  Tell your healthcare provider as soon as you feel a change in your baby's movements  When to measure kick counts:   · Measure kick counts at the same time every day  · Measure kick counts when your baby is awake and most active  Your baby may be most active in the evening  How to measure kick counts:  Check that your baby is awake before you measure kick counts  You can wake up your baby by lightly pushing on your belly, walking, or drinking something cold  Your healthcare provider may tell you different ways to measure kick counts  You may be told to do the following:  · Use a chart or clock to keep track of the time you start and finish counting  · Sit in a chair or lie on your left side  · Place your hands on the largest part of your belly  · Count until you reach 10 kicks  Write down how much time it takes to count 10 kicks  · It may take 30 minutes to 2 hours to count 10 kicks  It should not take more than 2 hours to count 10 kicks  Follow up with your healthcare provider as directed:  Write down your questions so you remember to ask them during your visits  © Copyright [employer] Information is for End User's use only and may not be sold, redistributed or otherwise used for commercial purposes  All illustrations and images included in CareNotes® are the copyrighted property of A D A M , Inc  or [facility] Wayne Delacruz   The above information is an  only  It is not intended as medical advice for individual conditions or treatments  Talk to your doctor, nurse or pharmacist before following any medical regimen to see if it is safe and effective for you

## 2021-05-20 NOTE — LETTER
NST sleeve cover sheet    Patient name: Dara Jimenez  : 1990  MRN: [id number]    BERTHA: Estimated Date of Delivery: 21    Obstetrician: __Mann_____________________________    Reason(s) for testing: GDM  __________________________________________      Testing frequency:    _x__ 2x/wk  ___ 1x/wk  ___ Dopplers  ___ BPP?       Last growth scan: __________________________________________

## 2021-05-20 NOTE — PROGRESS NOTES
[address] [place], [place] [address]kyler: Ms Domonique Hernandez was seen today at 37w2d for NST (found under the pregnancy episode) which I reviewed the RN assessment and agree    Please don't hesitate to contact our office with any concerns or questions   -Minnie Warren MD

## 2021-05-26 ENCOUNTER — APPOINTMENT (OUTPATIENT)
Dept: LAB | Facility: CLINIC | Age: 31
End: 2021-05-26
Payer: COMMERCIAL

## 2021-05-26 ENCOUNTER — DOCUMENTATION (OUTPATIENT)
Dept: PERINATAL CARE | Facility: CLINIC | Age: 31
End: 2021-05-26

## 2021-05-26 ENCOUNTER — ROUTINE PRENATAL (OUTPATIENT)
Dept: OBGYN CLINIC | Facility: CLINIC | Age: 31
End: 2021-05-26

## 2021-05-26 VITALS
WEIGHT: 216.3 LBS | DIASTOLIC BLOOD PRESSURE: 80 MMHG | HEIGHT: 65 IN | SYSTOLIC BLOOD PRESSURE: 118 MMHG | BODY MASS INDEX: 36.04 KG/M2

## 2021-05-26 DIAGNOSIS — Z3A.38 38 WEEKS GESTATION OF PREGNANCY: Primary | ICD-10-CM

## 2021-05-26 LAB
EST. AVERAGE GLUCOSE BLD GHB EST-MCNC: 100 MG/DL
HBA1C MFR BLD: 5.1 %

## 2021-05-26 PROCEDURE — 36415 COLL VENOUS BLD VENIPUNCTURE: CPT | Performed by: DIETITIAN, REGISTERED

## 2021-05-26 PROCEDURE — 83036 HEMOGLOBIN GLYCOSYLATED A1C: CPT | Performed by: DIETITIAN, REGISTERED

## 2021-05-26 PROCEDURE — 3044F HG A1C LEVEL LT 7.0%: CPT | Performed by: OBSTETRICS & GYNECOLOGY

## 2021-05-26 PROCEDURE — 3008F BODY MASS INDEX DOCD: CPT | Performed by: OBSTETRICS & GYNECOLOGY

## 2021-05-26 PROCEDURE — PNV: Performed by: OBSTETRICS & GYNECOLOGY

## 2021-05-26 NOTE — PROGRESS NOTES
Date: 05/26/21  Baldo Mane  1990  Estimated Date of Delivery: 6/8/21  38w1d  OB/GYN: Dr Gretta Jensen  Diagnosis: GDM, diet controlled, third trimester         Assessment and Plan:   Fasting blood sugars within range 60-90 mg/dl  Some 2 hr pp elevated values after meals, highest value 162     Induction of labor scheduled for 05/27/2021 7am      Checks blood sugar 4 times per day; fasting and 2 hrs pp with a One Touch Verio  blood glucose meter    Rashid Pelayo RN

## 2021-05-27 ENCOUNTER — HOSPITAL ENCOUNTER (INPATIENT)
Facility: HOSPITAL | Age: 31
LOS: 1 days | Discharge: HOME | End: 2021-05-28
Attending: OBSTETRICS & GYNECOLOGY | Admitting: OBSTETRICS & GYNECOLOGY
Payer: COMMERCIAL

## 2021-05-27 ENCOUNTER — APPOINTMENT (OUTPATIENT)
Dept: LABOR AND DELIVERY | Facility: HOSPITAL | Age: 31
End: 2021-05-27
Payer: COMMERCIAL

## 2021-05-27 DIAGNOSIS — O99.210 OBESITY AFFECTING PREGNANCY, ANTEPARTUM: Primary | ICD-10-CM

## 2021-05-27 LAB
ABO GROUP BLD: NORMAL
BASE EXCESS BLDCOA CALC-SCNC: -0.1 MMOL/L (ref 3–11)
BASE EXCESS BLDCOV CALC-SCNC: -0.9 MMOL/L (ref 1–9)
BLD GP AB SCN SERPL QL: NEGATIVE
ERYTHROCYTE [DISTWIDTH] IN BLOOD BY AUTOMATED COUNT: 13.3 % (ref 11.6–15.1)
GLUCOSE SERPL-MCNC: 71 MG/DL (ref 65–140)
GLUCOSE SERPL-MCNC: 76 MG/DL (ref 65–140)
GLUCOSE SERPL-MCNC: 80 MG/DL (ref 65–140)
GLUCOSE SERPL-MCNC: 81 MG/DL (ref 65–140)
GLUCOSE SERPL-MCNC: 81 MG/DL (ref 65–140)
HCO3 BLDCOA-SCNC: 25.9 MMOL/L (ref 17.3–27.3)
HCO3 BLDCOV-SCNC: 23.4 MMOL/L (ref 12.2–28.6)
HCT VFR BLD AUTO: 35.3 % (ref 34.8–46.1)
HGB BLD-MCNC: 12 G/DL (ref 11.5–15.4)
MCH RBC QN AUTO: 30.2 PG (ref 26.8–34.3)
MCHC RBC AUTO-ENTMCNC: 34 G/DL (ref 31.4–37.4)
MCV RBC AUTO: 89 FL (ref 82–98)
O2 CT VFR BLDCOA CALC: 7.9 ML/DL
OXYHGB MFR BLDCOA: 40.5 %
OXYHGB MFR BLDCOV: 72.3 %
PCO2 BLDCOA: 47.1 MM[HG] (ref 30–60)
PCO2 BLDCOV: 37.9 MM HG (ref 27–43)
PH BLDCOA: 7.36 [PH] (ref 7.23–7.43)
PH BLDCOV: 7.41 [PH] (ref 7.19–7.49)
PLATELET # BLD AUTO: 263 THOUSANDS/UL (ref 149–390)
PMV BLD AUTO: 10.3 FL (ref 8.9–12.7)
PO2 BLDCOA: 17.5 MM HG (ref 5–25)
PO2 BLDCOV: 28.6 MM HG (ref 15–45)
RBC # BLD AUTO: 3.98 MILLION/UL (ref 3.81–5.12)
RH BLD: POSITIVE
RPR SER QL: NORMAL
SAO2 % BLDCOV: 15.2 ML/DL
SPECIMEN EXPIRATION DATE: NORMAL
WBC # BLD AUTO: 9.66 THOUSAND/UL (ref 4.31–10.16)

## 2021-05-27 PROCEDURE — 82805 BLOOD GASES W/O2 SATURATION: CPT | Performed by: OBSTETRICS & GYNECOLOGY

## 2021-05-27 PROCEDURE — 85027 COMPLETE CBC AUTOMATED: CPT | Performed by: OBSTETRICS & GYNECOLOGY

## 2021-05-27 PROCEDURE — 86592 SYPHILIS TEST NON-TREP QUAL: CPT | Performed by: OBSTETRICS & GYNECOLOGY

## 2021-05-27 PROCEDURE — 3E033VJ INTRODUCTION OF OTHER HORMONE INTO PERIPHERAL VEIN, PERCUTANEOUS APPROACH: ICD-10-PCS | Performed by: OBSTETRICS & GYNECOLOGY

## 2021-05-27 PROCEDURE — 99024 POSTOP FOLLOW-UP VISIT: CPT | Performed by: OBSTETRICS & GYNECOLOGY

## 2021-05-27 PROCEDURE — 59400 OBSTETRICAL CARE: CPT | Performed by: OBSTETRICS & GYNECOLOGY

## 2021-05-27 PROCEDURE — NC001 PR NO CHARGE: Performed by: OBSTETRICS & GYNECOLOGY

## 2021-05-27 PROCEDURE — 0HQ9XZZ REPAIR PERINEUM SKIN, EXTERNAL APPROACH: ICD-10-PCS | Performed by: OBSTETRICS & GYNECOLOGY

## 2021-05-27 PROCEDURE — 4A1HXCZ MONITORING OF PRODUCTS OF CONCEPTION, CARDIAC RATE, EXTERNAL APPROACH: ICD-10-PCS | Performed by: OBSTETRICS & GYNECOLOGY

## 2021-05-27 PROCEDURE — 82948 REAGENT STRIP/BLOOD GLUCOSE: CPT

## 2021-05-27 PROCEDURE — 86900 BLOOD TYPING SEROLOGIC ABO: CPT | Performed by: OBSTETRICS & GYNECOLOGY

## 2021-05-27 PROCEDURE — 10907ZC DRAINAGE OF AMNIOTIC FLUID, THERAPEUTIC FROM PRODUCTS OF CONCEPTION, VIA NATURAL OR ARTIFICIAL OPENING: ICD-10-PCS | Performed by: OBSTETRICS & GYNECOLOGY

## 2021-05-27 PROCEDURE — 86850 RBC ANTIBODY SCREEN: CPT | Performed by: OBSTETRICS & GYNECOLOGY

## 2021-05-27 PROCEDURE — 86901 BLOOD TYPING SEROLOGIC RH(D): CPT | Performed by: OBSTETRICS & GYNECOLOGY

## 2021-05-27 RX ORDER — DIAPER,BRIEF,INFANT-TODD,DISP
1 EACH MISCELLANEOUS 2 TIMES DAILY
Status: DISCONTINUED | OUTPATIENT
Start: 2021-05-27 | End: 2021-05-28 | Stop reason: HOSPADM

## 2021-05-27 RX ORDER — ACETAMINOPHEN 325 MG/1
650 TABLET ORAL EVERY 4 HOURS PRN
Status: DISCONTINUED | OUTPATIENT
Start: 2021-05-27 | End: 2021-05-28 | Stop reason: HOSPADM

## 2021-05-27 RX ORDER — DOCUSATE SODIUM 100 MG/1
100 CAPSULE, LIQUID FILLED ORAL 2 TIMES DAILY
Status: DISCONTINUED | OUTPATIENT
Start: 2021-05-27 | End: 2021-05-28 | Stop reason: HOSPADM

## 2021-05-27 RX ORDER — FAMOTIDINE 20 MG/1
20 TABLET, FILM COATED ORAL 2 TIMES DAILY
Status: DISCONTINUED | OUTPATIENT
Start: 2021-05-27 | End: 2021-05-28 | Stop reason: HOSPADM

## 2021-05-27 RX ORDER — OXYCODONE HYDROCHLORIDE AND ACETAMINOPHEN 5; 325 MG/1; MG/1
1 TABLET ORAL EVERY 4 HOURS PRN
Status: DISCONTINUED | OUTPATIENT
Start: 2021-05-27 | End: 2021-05-28 | Stop reason: HOSPADM

## 2021-05-27 RX ORDER — LIDOCAINE HYDROCHLORIDE 10 MG/ML
INJECTION, SOLUTION EPIDURAL; INFILTRATION; INTRACAUDAL; PERINEURAL
Status: COMPLETED
Start: 2021-05-27 | End: 2021-05-27

## 2021-05-27 RX ORDER — LIDOCAINE HYDROCHLORIDE 10 MG/ML
30 INJECTION, SOLUTION EPIDURAL; INFILTRATION; INTRACAUDAL; PERINEURAL ONCE
Status: COMPLETED | OUTPATIENT
Start: 2021-05-27 | End: 2021-05-27

## 2021-05-27 RX ORDER — CALCIUM CARBONATE 200(500)MG
500 TABLET,CHEWABLE ORAL DAILY PRN
Status: DISCONTINUED | OUTPATIENT
Start: 2021-05-27 | End: 2021-05-28 | Stop reason: HOSPADM

## 2021-05-27 RX ORDER — OXYCODONE HYDROCHLORIDE AND ACETAMINOPHEN 5; 325 MG/1; MG/1
2 TABLET ORAL EVERY 4 HOURS PRN
Status: DISCONTINUED | OUTPATIENT
Start: 2021-05-27 | End: 2021-05-28 | Stop reason: HOSPADM

## 2021-05-27 RX ORDER — IBUPROFEN 600 MG/1
600 TABLET ORAL EVERY 6 HOURS PRN
Status: DISCONTINUED | OUTPATIENT
Start: 2021-05-27 | End: 2021-05-28 | Stop reason: HOSPADM

## 2021-05-27 RX ORDER — OXYTOCIN/RINGER'S LACTATE 30/500 ML
1-30 PLASTIC BAG, INJECTION (ML) INTRAVENOUS
Status: DISCONTINUED | OUTPATIENT
Start: 2021-05-27 | End: 2021-05-27

## 2021-05-27 RX ORDER — SODIUM CHLORIDE 9 MG/ML
125 INJECTION, SOLUTION INTRAVENOUS CONTINUOUS
Status: DISCONTINUED | OUTPATIENT
Start: 2021-05-27 | End: 2021-05-27

## 2021-05-27 RX ORDER — ONDANSETRON 2 MG/ML
4 INJECTION INTRAMUSCULAR; INTRAVENOUS EVERY 4 HOURS PRN
Status: DISCONTINUED | OUTPATIENT
Start: 2021-05-27 | End: 2021-05-28 | Stop reason: HOSPADM

## 2021-05-27 RX ADMIN — SODIUM CHLORIDE 125 ML/HR: 0.9 INJECTION, SOLUTION INTRAVENOUS at 08:40

## 2021-05-27 RX ADMIN — LIDOCAINE HYDROCHLORIDE 300 MG: 10 INJECTION, SOLUTION EPIDURAL; INFILTRATION; INTRACAUDAL; PERINEURAL at 14:34

## 2021-05-27 RX ADMIN — DOCUSATE SODIUM 100 MG: 100 CAPSULE, LIQUID FILLED ORAL at 18:03

## 2021-05-27 RX ADMIN — WITCH HAZEL 1 PAD: 500 SOLUTION RECTAL; TOPICAL at 15:40

## 2021-05-27 RX ADMIN — Medication 2 MILLI-UNITS/MIN: at 09:05

## 2021-05-27 RX ADMIN — SODIUM CHLORIDE 125 ML/HR: 0.9 INJECTION, SOLUTION INTRAVENOUS at 13:54

## 2021-05-27 RX ADMIN — BENZOCAINE AND LEVOMENTHOL: 200; 5 SPRAY TOPICAL at 15:40

## 2021-05-27 NOTE — DISCHARGE SUMMARY
Discharge Summary - Sonido Kirby 27 y o  female MRN: [id number]    Unit/Bed#: LD  Encounter: [id number]    Admission Date: 2021     Discharge Date: 2021    Admitting Attending: Dr Pepper Serrato  Delivering Attending: Dr Pepper Serrato  Discharge Attending: Frantz Tang    Diagnosis:   Patient Active Problem List   Diagnosis    H/O gestational diabetes in prior pregnancy, currently pregnant    COVID-19 affecting pregnancy in second trimester    Gestational diabetes mellitus (GDM) in third trimester    Obesity affecting pregnancy     (spontaneous vaginal delivery)         Procedures: Spontaneous Vaginal Delivery    Complications: none apparent    Hospital Course:    Ms Sonido Kirby is a 27 y o  [id number] at 38wk2d  She presented to labor and delivery for induction in labor in setting of poor controled A2GDM  Patient was started on pitocin titration and subsequently received and amniotomy for clear fluid  She progressed to complete and started pushing  After pushing for 8 minutes, at 1428 patient delivered a viable female ,  apgars of 9 (1 min) and 9 (5 min)   weight: 3595g (7lb 14 8oz), placenta was delivered without complications, perineal inspection evidenced a first degree perineal laceration, it was repaired  She had an uncomplicated postpartum course and was discharged the next  day of her delivery  She was tolerating PO, passing gas, voiding without problem, her pain was controled , her uterus fundus was infraumbilical and her vaginal bleeding was normal for a postpartum patient  We discussed plan with patient, all her questions were addressed before her discharge  Condition at discharge: good     On day of discharge, patient was tolerating PO, passing flatus, urinating, and ambulating  Her pain was well controlled with oral analgesics   She was discharged home on postpartum day #1 with standard post partum instructions to follow up with her physician in 3-6 weeks for a postpartum appointment  Discharge instructions/Information to patient and family:   - Do not place anything (no partner, tampons or douche) in your vagina for 6 weeks  -You may walk for exercise for the first 6 weeks then gradually return to your usual activities    -Please do not drive for 1 week if you have no stitches and for 2 weeks if you have stitches or underwent a  delivery     -You may take baths or shower per your preference    -Please look at your bust (breasts) in the mirror daily and call for redness or tenderness or increased warmth    -Please call us for temperature > 100 4*F or 38* C, worsening pain or a foul discharge  Discharge Medications:   Prenatal vitamin daily for 6 months or the duration of nursing whichever is longer  Motrin 600 mg orally every 6 hours as needed for pain  Tylenol (over the counter) per bottle directions as needed for pain: do NOT use with percocet  Hydrocortisone cream 1% (over the counter) applied 1-2x daily to hemorrhoids as needed    Provisions for Follow-Up Care: Follow up with your doctor in 3 weeks for postpartum care  Planned Readmission: No    Teresa Kendall MD       Case and note reviewed agree  Instructions given

## 2021-05-27 NOTE — OB LABOR/OXYTOCIN SAFETY PROGRESS
Oxytocin Safety Progress Check Note - Burgess Martinez 27 y o  female MRN: [id number]    Unit/Bed#: -01 Encounter: [id number]    Dose (wil-units/min) Oxytocin: 10 wil-units/min  Contraction Frequency (minutes): 2-3  Contraction Quality: Moderate  Tachysystole: No   Cervical Dilation: 5        Cervical Effacement: 70  Fetal Station: -2  Baseline Rate: 125 bpm  Fetal Heart Rate: 135 BPM  FHR Category: Category I               Vital Signs:   Vitals:    05/27/21 1246   BP: 125/79   Pulse: 79   Resp:    Temp:    SpO2:            Notes/comments:   Pt comfortable without epidural    AROM at this check with moderate clear fluid  Pt making good cervical change to 5/70/-2 cm  FHT:  category I,  Base line 135, moderate variability, ctx q 2-4min  Currently without decelerations      Will recheck pt in 2 hours, sooner if patient uncomfortable     D/w Dr Clotilde Causey MD 5/27/2021 1:11 PM

## 2021-05-27 NOTE — OB LABOR/OXYTOCIN SAFETY PROGRESS
Oxytocin Safety Progress Check Note - Dara Jimenez 27 y o  female MRN: [id number]    Unit/Bed#: -01 Encounter: [id number]    Dose (wil-units/min) Oxytocin: 8 wil-units/min  Contraction Frequency (minutes): 2 5-3 5  Contraction Quality: Mild  Tachysystole: No   Cervical Dilation: 4-5        Cervical Effacement: 70  Fetal Station: -3  Baseline Rate: 125 bpm  Fetal Heart Rate: 137 BPM  FHR Category: Category I               Vital Signs:   Vitals:    05/27/21 1047   BP: 127/75   Pulse: 77   Resp:    Temp:    SpO2:            Notes/comments: The patient has made good cervical change  Will continue to up titrate Pitocin  She is not interested in an epidural at this time  Will recheck in 2 hours earlier if needed    Discussed with Dr Stephanie Verdugo MD 5/27/2021 11:09 AM

## 2021-05-27 NOTE — PLAN OF CARE
Problem: PAIN - ADULT  Goal: Verbalizes/displays adequate comfort level or baseline comfort level  Description: Interventions:  - Encourage patient to monitor pain and request assistance  - Assess pain using appropriate pain scale  - Administer analgesics based on type and severity of pain and evaluate response  - Implement non-pharmacological measures as appropriate and evaluate response  - Consider cultural and social influences on pain and pain management  - Notify physician/advanced practitioner if interventions unsuccessful or patient reports new pain  Outcome: Progressing     Problem: INFECTION - ADULT  Goal: Absence or prevention of progression during hospitalization  Description: INTERVENTIONS:  - Assess and monitor for signs and symptoms of infection  - Monitor lab/diagnostic results  - Monitor all insertion sites, i e  indwelling lines, tubes, and drains  - Monitor endotracheal if appropriate and nasal secretions for changes in amount and color  - Falls appropriate cooling/warming therapies per order  - Administer medications as ordered  - Instruct and encourage patient and family to use good hand hygiene technique  - Identify and instruct in appropriate isolation precautions for identified infection/condition  Outcome: Progressing  Goal: Absence of fever/infection during neutropenic period  Description: INTERVENTIONS:  - Monitor WBC    Outcome: Progressing     Problem: SAFETY ADULT  Goal: Patient will remain free of falls  Description: INTERVENTIONS:  - Assess patient frequently for physical needs  -  Identify cognitive and physical deficits and behaviors that affect risk of falls    -  Falls fall precautions as indicated by assessment   - Educate patient/family on patient safety including physical limitations  - Instruct patient to call for assistance with activity based on assessment  - Modify environment to reduce risk of injury  - Consider OT/PT consult to assist with strengthening/mobility  Outcome: Progressing  Goal: Maintain or return to baseline ADL function  Description: INTERVENTIONS:  -  Assess patient's ability to carry out ADLs; assess patient's baseline for ADL function and identify physical deficits which impact ability to perform ADLs (bathing, care of mouth/teeth, toileting, grooming, dressing, etc )  - Assess/evaluate cause of self-care deficits   - Assess range of motion  - Assess patient's mobility; develop plan if impaired  - Assess patient's need for assistive devices and provide as appropriate  - Encourage maximum independence but intervene and supervise when necessary  - Involve family in performance of ADLs  - Assess for home care needs following discharge   - Consider OT consult to assist with ADL evaluation and planning for discharge  - Provide patient education as appropriate  Outcome: Progressing  Goal: Maintain or return mobility status to optimal level  Description: INTERVENTIONS:  - Assess patient's baseline mobility status (ambulation, transfers, stairs, etc )    - Identify cognitive and physical deficits and behaviors that affect mobility  - Identify mobility aids required to assist with transfers and/or ambulation (gait belt, sit-to-stand, lift, walker, cane, etc )  - [place] fall precautions as indicated by assessment  - Record patient progress and toleration of activity level on Mobility SBAR; progress patient to next Phase/Stage  - Instruct patient to call for assistance with activity based on assessment  - Consider rehabilitation consult to assist with strengthening/weightbearing, etc   Outcome: Progressing     Problem: Knowledge Deficit  Goal: Patient/family/caregiver demonstrates understanding of disease process, treatment plan, medications, and discharge instructions  Description: Complete learning assessment and assess knowledge base    Interventions:  - Provide teaching at level of understanding  - Provide teaching via preferred learning methods  Outcome: Progressing  Goal: Verbalizes understanding of labor plan  Description: Assess patient/family/caregiver's baseline knowledge level and ability to understand information  Provide education via patient/family/caregiver's preferred learning method at appropriate level of understanding  1  Provide teaching at level of understanding  2  Provide teaching via preferred learning method(s)  Outcome: Progressing     Problem: DISCHARGE PLANNING  Goal: Discharge to home or other facility with appropriate resources  Description: INTERVENTIONS:  - Identify barriers to discharge w/patient and caregiver  - Arrange for needed discharge resources and transportation as appropriate  - Identify discharge learning needs (meds, wound care, etc )  - Arrange for interpretive services to assist at discharge as needed  - Refer to Case Management Department for coordinating discharge planning if the patient needs post-hospital services based on physician/advanced practitioner order or complex needs related to functional status, cognitive ability, or social support system  Outcome: Progressing     Problem: Labor & Delivery  Goal: Manages discomfort  Description: Assess and monitor for signs and symptoms of discomfort  Assess patient's pain level regularly and per hospital policy  Administer medications as ordered  Support use of nonpharmacological methods to help control pain such as distraction, imagery, relaxation, and application of heat and cold  Collaborate with interdisciplinary team and patient to determine appropriate pain management plan  1  Include patient in decisions related to comfort  2  Offer non-pharmacological pain management interventions  3  Report ineffective pain management to physician  Outcome: Progressing  Goal: Patient vital signs are stable  Description: 1  Assess vital signs - vaginal delivery    Outcome: Progressing     Problem: BIRTH - VAGINAL/ SECTION  Goal: Fetal and maternal status remain reassuring during the birth process  Description: INTERVENTIONS:  - Monitor vital signs  - Monitor fetal heart rate  - Monitor uterine activity  - Monitor labor progression (vaginal delivery)  - DVT prophylaxis  - Antibiotic prophylaxis  Outcome: Progressing  Goal: Emotionally satisfying birthing experience for mother/fetus  Description: Interventions:  - Assess, plan, implement and evaluate the nursing care given to the patient in labor  - Advocate the philosophy that each childbirth experience is a unique experience and support the family's chosen level of involvement and control during the labor process   - Actively participate in both the patient's and family's teaching of the birth process  - Consider cultural, Mosque and age-specific factors and plan care for the patient in labor  Outcome: Progressing

## 2021-05-27 NOTE — H&P
H&P Exam - Obstetrics   Mary Lou Daigle 27 y o  female MRN: [id number]  Unit/Bed#: LD  Encounter: [id number]      History of Present Illness      Dr Zeke Hart covering  For Dr Angela Mantilla    Chief Complaint: Induction of labor    HPI:  Mary Lou Daigle is a 27 y o  [id number] female with an BERTHA of 2021, by Last Menstrual Period at 38w2d weeks gestation who is being admitted for induction of labor in setting of poor control A2 GDM  In addition pregnancy complicated history gestational diabetes in previous pregnancy, history is COVID-19 infection this pregnancy, maternal BMI of 35  Last estimated fetal weight 2021, 62 percentile abdominal circumference in percentile 90  GBS negative  Rh positive    Contractions: no  Loss of fluid: no  Vaginal bleeding: no  Fetal movement: yes    She is [facility] patient  PREGNANCY COMPLICATIONS:   1) A2 GDM  2) maternal BMI of 35  3) history of COVID-19 in this pregnancy    OB History    Para Term  AB Living   4 2 2   1 2   SAB TAB Ectopic Multiple Live Births   1 0 0 0 2      # Outcome Date GA Lbr Santos/2nd Weight Sex Delivery Anes PTL Lv   4 Current            3 Term 05/16/15 38w0d  3714 g (8 lb 3 oz) F Vag-Spont None N JAS      Birth Comments: pre gestational    2 SAB  6w0d    SAB      1 Term 11 39w0d  3487 g (7 lb 11 oz) F Vag-Spont EPI N JAS      Complications: History of episiotomy       Baby complications/comments: Last estimated fetal weight 2021, 62 percentile abdominal circumference in percentile 90  Review of Systems   Constitutional: Negative  HENT: Negative  Eyes: Negative  Respiratory: Negative  Cardiovascular: Negative  Gastrointestinal: Negative  Endocrine: Negative  Genitourinary: Negative  Musculoskeletal: Negative  Allergic/Immunologic: Negative  Neurological: Negative  Hematological: Negative  Psychiatric/Behavioral: Negative            Historical Information   Past Medical History: Diagnosis Date    Anemia     no meds     COVID-19 affecting pregnancy in second trimester 12/27/21    Gestational diabetes     gestational last pregnancy and prediabetes 2018- Resolved with 40 lb weight loss    Gestational diabetes mellitus (GDM) in third trimester 5/17/2021    IBS (irritable bowel syndrome)     Migraine     occular migranes no meds     Numerous skin moles     pre cancerous moles removed     Urinary tract infection     Varicella     had a child      Past Surgical History:   Procedure Laterality Date    COLONOSCOPY  01/01/2012    negative    MOLE REMOVAL      SKIN TAG REMOVAL      removal of pre-cancerous moles x 2     Social History   Social History     Substance and Sexual Activity   Alcohol Use Not Currently    Frequency: Monthly or less    Comment: Occasional      Social History     Substance and Sexual Activity   Drug Use Never     Social History     Tobacco Use   Smoking Status Never Smoker   Smokeless Tobacco Never Used     Family History: non-contributory    Meds/Allergies      Medications Prior to Admission   Medication    Blood Glucose Monitoring Suppl (OneTouch Verio Flex System) w/Device KIT    famotidine (PEPCID) 20 mg tablet    OneTouch Delica Lancets 40L MISC    OneTouch Verio test strip    Prenatal Vit-Fe Fumarate-FA (PRENATAL VITAMIN PO)    metoclopramide (REGLAN) 10 mg tablet    ondansetron (ZOFRAN) 4 mg tablet        Allergies   Allergen Reactions    Dye [Iodinated Diagnostic Agents] Hives      Iv dye        OBJECTIVE:    Vitals: Blood pressure 117/80, pulse 100, temperature 98 6 °F (37 °C), temperature source Oral, resp  rate 16, height 5' 5" (1 651 m), weight 98 kg (216 lb), last menstrual period 09/01/2020, SpO2 98 %, not currently breastfeeding  Body mass index is 35 94 kg/m²  Physical Exam  Constitutional:       Appearance: She is well-developed  HENT:      Head: Normocephalic and atraumatic     Eyes:      Pupils: Pupils are equal, round, and reactive to light  Neck:      Musculoskeletal: Normal range of motion  Cardiovascular:      Rate and Rhythm: Normal rate  Pulmonary:      Effort: Pulmonary effort is normal    Abdominal:      Palpations: Abdomen is soft  Genitourinary:     Vagina: Normal    Neurological:      Mental Status: She is alert and oriented to person, place, and time           Cervix:3 5/50/-3       Fetal heart rate:   Baseline Rate: 130 bpm  Variability: Moderate 6-25 bpm  Accelerations: 15 x 15 or greater, With fetal movment, At variable times  FHR Category: Category I    [place]:   Contraction Frequency (minutes): irritable    EFW: 7 5    GBS: negative    Prenatal Labs:   Blood Type:   Lab Results   Component Value Date/Time    ABO Grouping A 11/01/2020 12:19 PM     , D (Rh type):   Lab Results   Component Value Date/Time    Rh Factor Positive 11/01/2020 12:19 PM     , Antibody Screen: No results found for: ANTIBODYSCR , HCT/HGB:   Lab Results   Component Value Date/Time    Hematocrit 33 8 (L) 03/06/2021 10:57 AM    Hemoglobin 11 2 (L) 03/06/2021 10:57 AM      , MCV:   Lab Results   Component Value Date/Time    MCV 90 03/06/2021 10:57 AM      , Platelets:   Lab Results   Component Value Date/Time    Platelets 087 52/98/9461 10:57 AM      , 1 hour Glucola:   Lab Results   Component Value Date/Time    Glucose 157 (H) 05/01/2021 10:33 AM   , 3 hour GTT: No results found for: [id number], Rubella:   Lab Results   Component Value Date/Time    Rubella IgG Quant >175 0 11/01/2020 12:19 PM        , VDRL/RPR:   Lab Results   Component Value Date/Time    RPR Non-Reactive 03/06/2021 10:57 AM      , Hep B:   Lab Results   Component Value Date/Time    Hepatitis B Surface Ag Non-reactive 11/01/2020 12:19 PM     , HIV:   Lab Results   Component Value Date/Time    HIV-1/HIV-2 Ab Non-Reactive 11/01/2020 12:19 PM     , Gonorrhea:   Lab Results   Component Value Date/Time    N gonorrhoeae, DNA Probe Negative 11/02/2020 04:23 PM     , Group B Strep:    Lab Results   Component Value Date/Time    Strep Grp B PCR Negative 2021 09:14 AM          Invasive Devices     Peripheral Intravenous Line            Peripheral IV 21 Left;Dorsal (posterior) Forearm less than 1 day                  Assessment/Plan     ASSESSMENT:  34yo  at 38w2d weeks gestation who is being admitted for IOL A2GDM  Pregnancy complicated by: see above  SVE: 3 50/-3  FHT: 130  Clinical EFW: 7 5  ; Vertex confirmed by U/S  GBS status: negative   Rh: positive    PLAN:    - Admit  - CBC, RPR, Blood Type  - Start with pitocin   - GBS negative status: no PCN for prophylaxis   - Analgesia and/or epidural at patient request  - Anticipate     Discussed with Dr Derek Arboleda       This patient will be an INPATIENT  and I certify the anticipated length of stay is >2 Midnights      Glen Field MD  2021  8:51 AM

## 2021-05-27 NOTE — L&D DELIVERY NOTE
Vaginal Delivery Summary - OB/GYN   Sim Owens 27 y o  female MRN: [id number]  Unit/Bed#: -01 Encounter: [id number]          Predelivery Diagnosis:  1  Pregnancy at 38 weeks and 2 days  2  GDM     Postdelivery Diagnosis:  1  Same as above  2  Delivery of female term     Procedure: Spontaneous Vaginal Delivery, repair of first degree perineal  laceration    Attending: Dr Martínez Rosenthal    Assistant: Dr Minnie Castle    Anesthesia:  Local     QBL: 41cc  Admission H  Admission platelets: 650    Complications: none apparent    Specimens: cord blood, arterial and venous cord blood gasses, placenta to storage    Findings:   1  Viable female at 1, with APGARS of 9 and 9 at 1 and 5 minutes respectively,  2  Spontaneous delivery of intact placenta at 1432  3  1 degree laceration repaired with vicryl 3 0  4  Blood gases:    Umbilical Cord Venous Blood Gas:  Results from last 7 days   Lab Units 21  1430   PH COV  7 409   PCO2 COV mm HG 37 9   HCO3 COV mmol/L 23 4   BASE EXC COV mmol/L -0 9*   O2 CT CD VB mL/dL 15 2   O2 HGB, VENOUS CORD % 06 6     Umbilical Cord Arterial Blood Gas:  Results from last 7 days   Lab Units 21  1430   PH COA  7 358   PCO2 COA  47 1   PO2 COA mm HG 17 5   HCO3 COA mmol/L 25 9   BASE EXC COA mmol/L -0 1*   O2 CONTENT CORD ART ml/dl 7 9   O2 HGB, ARTERIAL CORD % 40 5       Disposition:  Patient tolerated the procedure well and was recovering in labor and delivery room     Brief history and labor course:  Ms Sim Owens is a 27 y o  [id number] at 38wk2d  She presented to labor and delivery for induction in labor in setting of poor controled A2GDM  Patient was started on pitocin titration and subsequently received and amniotomy for clear fluid  She progressed to complete and started pushing  Description of procedure    After pushing for 8 minutes, at 1428 patient delivered a viable female ,  apgars of 9 (1 min) and 9 (5 min)  The fetal vertex delivered spontaneously   Baby was checked for nuchal  There was not evidence of nuchal  The anterior right shoulder delivered atraumatically with maternal expulsive forces and the assistance of downward abena traction  The posterior left shoulder delivered with maternal expulsive forces and the assistance of upward traction  The remainder of the fetus delivered spontaneously  Upon delivery, the infant was placed on the mothers abdomen and the cord was clamped and cut  Delayed cord clamping was performed  The infant was noted to cry spontaneously and was moving all extremities appropriately  There was no evidence for injury  Awaiting nurse resuscitators evaluated the   Arterial and venous cord blood gases and cord blood was collected for analysis  These were promptly sent to the lab  In the immediate post-partum, 30 units of IV pitocin was administered, and the uterus was noted to contract down well with massage and pitocin  The placenta delivered spontaneously at 1432 and was noted to have a centrally inserted 3 vessel cord  The vagina, cervix, perineum, and rectum were inspected and there was noted to be a first degree perineal laceration, it was repaired with Vicryl 3 0 without complications  At the conclusion of the procedure, all needle, sponge, and instrument counts were noted to be correct  Patient tolerated the procedure well and was allowed to recover in labor and delivery room with family and  before being transferred to the post-partum floor  Dr Jeananne Aschoff was present and participated in all key portions of the case          Bridget Nicolas MD  2021  2:52 PM

## 2021-05-27 NOTE — PLAN OF CARE
Problem: PAIN - ADULT  Goal: Verbalizes/displays adequate comfort level or baseline comfort level  Description: Interventions:  - Encourage patient to monitor pain and request assistance  - Assess pain using appropriate pain scale  - Administer analgesics based on type and severity of pain and evaluate response  - Implement non-pharmacological measures as appropriate and evaluate response  - Consider cultural and social influences on pain and pain management  - Notify physician/advanced practitioner if interventions unsuccessful or patient reports new pain  5/27/2021 1637 by Lorna Irizarry RN  Outcome: Progressing  5/27/2021 0757 by Lorna Irizarry RN  Outcome: Progressing     Problem: INFECTION - ADULT  Goal: Absence or prevention of progression during hospitalization  Description: INTERVENTIONS:  - Assess and monitor for signs and symptoms of infection  - Monitor lab/diagnostic results  - Monitor all insertion sites, i e  indwelling lines, tubes, and drains  - Monitor endotracheal if appropriate and nasal secretions for changes in amount and color  - [place] appropriate cooling/warming therapies per order  - Administer medications as ordered  - Instruct and encourage patient and family to use good hand hygiene technique  - Identify and instruct in appropriate isolation precautions for identified infection/condition  5/27/2021 1637 by Lorna Irizarry RN  Outcome: Progressing  5/27/2021 0757 by Lorna Irizarry RN  Outcome: Progressing  Goal: Absence of fever/infection during neutropenic period  Description: INTERVENTIONS:  - Monitor WBC    5/27/2021 1637 by Lorna Irizarry RN  Outcome: Progressing  5/27/2021 0757 by Lorna Irizarry RN  Outcome: Progressing     Problem: SAFETY ADULT  Goal: Patient will remain free of falls  Description: INTERVENTIONS:  - Assess patient frequently for physical needs  -  Identify cognitive and physical deficits and behaviors that affect risk of falls    -  [place] fall precautions as indicated by assessment   - Educate patient/family on patient safety including physical limitations  - Instruct patient to call for assistance with activity based on assessment  - Modify environment to reduce risk of injury  - Consider OT/PT consult to assist with strengthening/mobility  5/27/2021 1637 by Jose Luis Ceron RN  Outcome: Progressing  5/27/2021 0757 by Jose Luis Ceron RN  Outcome: Progressing  Goal: Maintain or return to baseline ADL function  Description: INTERVENTIONS:  -  Assess patient's ability to carry out ADLs; assess patient's baseline for ADL function and identify physical deficits which impact ability to perform ADLs (bathing, care of mouth/teeth, toileting, grooming, dressing, etc )  - Assess/evaluate cause of self-care deficits   - Assess range of motion  - Assess patient's mobility; develop plan if impaired  - Assess patient's need for assistive devices and provide as appropriate  - Encourage maximum independence but intervene and supervise when necessary  - Involve family in performance of ADLs  - Assess for home care needs following discharge   - Consider OT consult to assist with ADL evaluation and planning for discharge  - Provide patient education as appropriate  5/27/2021 1637 by Jose Luis Ceron RN  Outcome: Progressing  5/27/2021 0757 by Jose Luis Ceron RN  Outcome: Progressing  Goal: Maintain or return mobility status to optimal level  Description: INTERVENTIONS:  - Assess patient's baseline mobility status (ambulation, transfers, stairs, etc )    - Identify cognitive and physical deficits and behaviors that affect mobility  - Identify mobility aids required to assist with transfers and/or ambulation (gait belt, sit-to-stand, lift, walker, cane, etc )  - [place] fall precautions as indicated by assessment  - Record patient progress and toleration of activity level on Mobility SBAR; progress patient to next Phase/Stage  - Instruct patient to call for assistance with activity based on assessment  - Consider rehabilitation consult to assist with strengthening/weightbearing, etc   2021 163 by Paulina Mercer RN  Outcome: Progressing  2021 by aPulina Mercer RN  Outcome: Progressing     Problem: DISCHARGE PLANNING  Goal: Discharge to home or other facility with appropriate resources  Description: INTERVENTIONS:  - Identify barriers to discharge w/patient and caregiver  - Arrange for needed discharge resources and transportation as appropriate  - Identify discharge learning needs (meds, wound care, etc )  - Arrange for interpretive services to assist at discharge as needed  - Refer to Case Management Department for coordinating discharge planning if the patient needs post-hospital services based on physician/advanced practitioner order or complex needs related to functional status, cognitive ability, or social support system  2021 1637 by Paulina Mercer RN  Outcome: Progressing  2021 by Paulina Mercer RN  Outcome: Progressing     Problem: POSTPARTUM  Goal: Experiences normal postpartum course  Description: INTERVENTIONS:  - Monitor maternal vital signs  - Assess uterine involution and lochia  Outcome: Progressing  Goal: Appropriate maternal -  bonding  Description: INTERVENTIONS:  - Identify family support  - Assess for appropriate maternal/infant bonding   -Encourage maternal/infant bonding opportunities  - Referral to  or  as needed  Outcome: Progressing  Goal: Establishment of infant feeding pattern  Description: INTERVENTIONS:  - Assess breast/bottle feeding  - Refer to lactation as needed  Outcome: Progressing  Goal: Incision(s), wounds(s) or drain site(s) healing without S/S of infection  Description: INTERVENTIONS  - Assess and document risk factors for skin impairment   - Assess and document dressing, incision, wound bed, drain sites and surrounding tissue  - Consider nutrition services referral as needed  - Oral mucous membranes remain intact  - Provide patient/ family education  Outcome: Progressing     Problem: Knowledge Deficit  Goal: Patient/family/caregiver demonstrates understanding of disease process, treatment plan, medications, and discharge instructions  Description: Complete learning assessment and assess knowledge base  Interventions:  - Provide teaching at level of understanding  - Provide teaching via preferred learning methods  5/27/2021 1637 by Rashid Campbell RN  Outcome: Completed  5/27/2021 0757 by Rashid Campbell RN  Outcome: Progressing  Goal: Verbalizes understanding of labor plan  Description: Assess patient/family/caregiver's baseline knowledge level and ability to understand information  Provide education via patient/family/caregiver's preferred learning method at appropriate level of understanding  1  Provide teaching at level of understanding  2  Provide teaching via preferred learning method(s)   5/27/2021 1637 by Rashid Campbell RN  Outcome: Completed  5/27/2021 0757 by Rashid Campbell RN  Outcome: Progressing     Problem: Labor & Delivery  Goal: Manages discomfort  Description: Assess and monitor for signs and symptoms of discomfort  Assess patient's pain level regularly and per hospital policy  Administer medications as ordered  Support use of nonpharmacological methods to help control pain such as distraction, imagery, relaxation, and application of heat and cold  Collaborate with interdisciplinary team and patient to determine appropriate pain management plan  1  Include patient in decisions related to comfort  2  Offer non-pharmacological pain management interventions  3  Report ineffective pain management to physician   5/27/2021 1637 by Rashid Campbell RN  Outcome: Completed  5/27/2021 0757 by Rashid Campbell RN  Outcome: Progressing  Goal: Patient vital signs are stable  Description: 1   Assess vital signs - vaginal delivery    2021 1637 by Lena Ferrera RN  Outcome: Completed  2021 075 by Lena Ferrera RN  Outcome: Progressing     Problem: BIRTH - VAGINAL/ SECTION  Goal: Fetal and maternal status remain reassuring during the birth process  Description: INTERVENTIONS:  - Monitor vital signs  - Monitor fetal heart rate  - Monitor uterine activity  - Monitor labor progression (vaginal delivery)  - DVT prophylaxis  - Antibiotic prophylaxis  2021 1637 by Lena Ferrera RN  Outcome: Completed  2021 by Lena Ferrera RN  Outcome: Progressing  Goal: Emotionally satisfying birthing experience for mother/fetus  Description: Interventions:  - Assess, plan, implement and evaluate the nursing care given to the patient in labor  - Advocate the philosophy that each childbirth experience is a unique experience and support the family's chosen level of involvement and control during the labor process   - Actively participate in both the patient's and family's teaching of the birth process  - Consider cultural, Jehovah's witness and age-specific factors and plan care for the patient in labor  [id number] by Lena Ferrera RN  Outcome: Completed  2021 by Lena Ferrera RN  Outcome: Progressing

## 2021-05-27 NOTE — PROGRESS NOTES
Patient seen evaluated denies any vaginal bleeding ruptures of membrane or contraction, having good fetal movement, denies any headache blurry vision or epigastric pain, M recommend delivery by 38 weeks secondary to poorly controlled like onset gestational diabetes, patient scheduled for induction on 05/27 pelvic exam performed cervix 2-3/50/-2 instruction given all questions answered and patient was satisfied and GBS negative

## 2021-05-28 VITALS
WEIGHT: 216 LBS | BODY MASS INDEX: 35.99 KG/M2 | SYSTOLIC BLOOD PRESSURE: 111 MMHG | HEIGHT: 65 IN | DIASTOLIC BLOOD PRESSURE: 67 MMHG | HEART RATE: 89 BPM | TEMPERATURE: 98.4 F | OXYGEN SATURATION: 98 % | RESPIRATION RATE: 18 BRPM

## 2021-05-28 PROCEDURE — 99024 POSTOP FOLLOW-UP VISIT: CPT | Performed by: OBSTETRICS & GYNECOLOGY

## 2021-05-28 RX ORDER — DIAPER,BRIEF,INFANT-TODD,DISP
1 EACH MISCELLANEOUS 2 TIMES DAILY
Qty: 30 G | Refills: 0
Start: 2021-05-28

## 2021-05-28 RX ORDER — ACETAMINOPHEN 325 MG/1
650 TABLET ORAL EVERY 4 HOURS PRN
Refills: 0
Start: 2021-05-28

## 2021-05-28 RX ORDER — IBUPROFEN 600 MG/1
600 TABLET ORAL EVERY 6 HOURS PRN
Qty: 30 TABLET | Refills: 0
Start: 2021-05-28

## 2021-05-28 RX ADMIN — FAMOTIDINE 20 MG: 20 TABLET ORAL at 08:57

## 2021-05-28 RX ADMIN — DOCUSATE SODIUM 100 MG: 100 CAPSULE, LIQUID FILLED ORAL at 08:57

## 2021-05-28 RX ADMIN — HYDROCORTISONE 1 APPLICATION: 1 CREAM TOPICAL at 08:57

## 2021-05-28 NOTE — PLAN OF CARE
Problem: PAIN - ADULT  Goal: Verbalizes/displays adequate comfort level or baseline comfort level  Description: Interventions:  - Encourage patient to monitor pain and request assistance  - Assess pain using appropriate pain scale  - Administer analgesics based on type and severity of pain and evaluate response  - Implement non-pharmacological measures as appropriate and evaluate response  - Consider cultural and social influences on pain and pain management  - Notify physician/advanced practitioner if interventions unsuccessful or patient reports new pain  Outcome: Progressing     Problem: INFECTION - ADULT  Goal: Absence or prevention of progression during hospitalization  Description: INTERVENTIONS:  - Assess and monitor for signs and symptoms of infection  - Monitor lab/diagnostic results  - Monitor all insertion sites, i e  indwelling lines, tubes, and drains  - Monitor endotracheal if appropriate and nasal secretions for changes in amount and color  - [place] appropriate cooling/warming therapies per order  - Administer medications as ordered  - Instruct and encourage patient and family to use good hand hygiene technique  - Identify and instruct in appropriate isolation precautions for identified infection/condition  Outcome: Progressing  Goal: Absence of fever/infection during neutropenic period  Description: INTERVENTIONS:  - Monitor WBC    Outcome: Progressing     Problem: SAFETY ADULT  Goal: Patient will remain free of falls  Description: INTERVENTIONS:  - Assess patient frequently for physical needs  -  Identify cognitive and physical deficits and behaviors that affect risk of falls    -  [place] fall precautions as indicated by assessment   - Educate patient/family on patient safety including physical limitations  - Instruct patient to call for assistance with activity based on assessment  - Modify environment to reduce risk of injury  - Consider OT/PT consult to assist with strengthening/mobility  Outcome: Progressing  Goal: Maintain or return to baseline ADL function  Description: INTERVENTIONS:  -  Assess patient's ability to carry out ADLs; assess patient's baseline for ADL function and identify physical deficits which impact ability to perform ADLs (bathing, care of mouth/teeth, toileting, grooming, dressing, etc )  - Assess/evaluate cause of self-care deficits   - Assess range of motion  - Assess patient's mobility; develop plan if impaired  - Assess patient's need for assistive devices and provide as appropriate  - Encourage maximum independence but intervene and supervise when necessary  - Involve family in performance of ADLs  - Assess for home care needs following discharge   - Consider OT consult to assist with ADL evaluation and planning for discharge  - Provide patient education as appropriate  Outcome: Progressing  Goal: Maintain or return mobility status to optimal level  Description: INTERVENTIONS:  - Assess patient's baseline mobility status (ambulation, transfers, stairs, etc )    - Identify cognitive and physical deficits and behaviors that affect mobility  - Identify mobility aids required to assist with transfers and/or ambulation (gait belt, sit-to-stand, lift, walker, cane, etc )  - [place] fall precautions as indicated by assessment  - Record patient progress and toleration of activity level on Mobility SBAR; progress patient to next Phase/Stage  - Instruct patient to call for assistance with activity based on assessment  - Consider rehabilitation consult to assist with strengthening/weightbearing, etc   Outcome: Progressing     Problem: DISCHARGE PLANNING  Goal: Discharge to home or other facility with appropriate resources  Description: INTERVENTIONS:  - Identify barriers to discharge w/patient and caregiver  - Arrange for needed discharge resources and transportation as appropriate  - Identify discharge learning needs (meds, wound care, etc )  - Arrange for interpretive services to assist at discharge as needed  - Refer to Case Management Department for coordinating discharge planning if the patient needs post-hospital services based on physician/advanced practitioner order or complex needs related to functional status, cognitive ability, or social support system  Outcome: Progressing     Problem: POSTPARTUM  Goal: Experiences normal postpartum course  Description: INTERVENTIONS:  - Monitor maternal vital signs  - Assess uterine involution and lochia  Outcome: Progressing  Goal: Appropriate maternal -  bonding  Description: INTERVENTIONS:  - Identify family support  - Assess for appropriate maternal/infant bonding   -Encourage maternal/infant bonding opportunities  - Referral to  or  as needed  Outcome: Progressing  Goal: Establishment of infant feeding pattern  Description: INTERVENTIONS:  - Assess breast/bottle feeding  - Refer to lactation as needed  Outcome: Progressing  Goal: Incision(s), wounds(s) or drain site(s) healing without S/S of infection  Description: INTERVENTIONS  - Assess and document risk factors for skin impairment   - Assess and document dressing, incision, wound bed, drain sites and surrounding tissue  - Consider nutrition services referral as needed  - Oral mucous membranes remain intact  - Provide patient/ family education  Outcome: Progressing

## 2021-05-28 NOTE — PLAN OF CARE
Problem: PAIN - ADULT  Goal: Verbalizes/displays adequate comfort level or baseline comfort level  Description: Interventions:  - Encourage patient to monitor pain and request assistance  - Assess pain using appropriate pain scale  - Administer analgesics based on type and severity of pain and evaluate response  - Implement non-pharmacological measures as appropriate and evaluate response  - Consider cultural and social influences on pain and pain management  - Notify physician/advanced practitioner if interventions unsuccessful or patient reports new pain  Outcome: Completed     Problem: INFECTION - ADULT  Goal: Absence or prevention of progression during hospitalization  Description: INTERVENTIONS:  - Assess and monitor for signs and symptoms of infection  - Monitor lab/diagnostic results  - Monitor all insertion sites, i e  indwelling lines, tubes, and drains  - Monitor endotracheal if appropriate and nasal secretions for changes in amount and color  - [place] appropriate cooling/warming therapies per order  - Administer medications as ordered  - Instruct and encourage patient and family to use good hand hygiene technique  - Identify and instruct in appropriate isolation precautions for identified infection/condition  Outcome: Completed  Goal: Absence of fever/infection during neutropenic period  Description: INTERVENTIONS:  - Monitor WBC    Outcome: Completed     Problem: SAFETY ADULT  Goal: Patient will remain free of falls  Description: INTERVENTIONS:  - Assess patient frequently for physical needs  -  Identify cognitive and physical deficits and behaviors that affect risk of falls    -  [place] fall precautions as indicated by assessment   - Educate patient/family on patient safety including physical limitations  - Instruct patient to call for assistance with activity based on assessment  - Modify environment to reduce risk of injury  - Consider OT/PT consult to assist with strengthening/mobility  Outcome: Completed  Goal: Maintain or return to baseline ADL function  Description: INTERVENTIONS:  -  Assess patient's ability to carry out ADLs; assess patient's baseline for ADL function and identify physical deficits which impact ability to perform ADLs (bathing, care of mouth/teeth, toileting, grooming, dressing, etc )  - Assess/evaluate cause of self-care deficits   - Assess range of motion  - Assess patient's mobility; develop plan if impaired  - Assess patient's need for assistive devices and provide as appropriate  - Encourage maximum independence but intervene and supervise when necessary  - Involve family in performance of ADLs  - Assess for home care needs following discharge   - Consider OT consult to assist with ADL evaluation and planning for discharge  - Provide patient education as appropriate  Outcome: Completed  Goal: Maintain or return mobility status to optimal level  Description: INTERVENTIONS:  - Assess patient's baseline mobility status (ambulation, transfers, stairs, etc )    - Identify cognitive and physical deficits and behaviors that affect mobility  - Identify mobility aids required to assist with transfers and/or ambulation (gait belt, sit-to-stand, lift, walker, cane, etc )  - [place] fall precautions as indicated by assessment  - Record patient progress and toleration of activity level on Mobility SBAR; progress patient to next Phase/Stage  - Instruct patient to call for assistance with activity based on assessment  - Consider rehabilitation consult to assist with strengthening/weightbearing, etc   Outcome: Completed     Problem: DISCHARGE PLANNING  Goal: Discharge to home or other facility with appropriate resources  Description: INTERVENTIONS:  - Identify barriers to discharge w/patient and caregiver  - Arrange for needed discharge resources and transportation as appropriate  - Identify discharge learning needs (meds, wound care, etc )  - Arrange for interpretive services to assist at discharge as needed  - Refer to Case Management Department for coordinating discharge planning if the patient needs post-hospital services based on physician/advanced practitioner order or complex needs related to functional status, cognitive ability, or social support system  Outcome: Completed     Problem: POSTPARTUM  Goal: Experiences normal postpartum course  Description: INTERVENTIONS:  - Monitor maternal vital signs  - Assess uterine involution and lochia  Outcome: Completed  Goal: Appropriate maternal -  bonding  Description: INTERVENTIONS:  - Identify family support  - Assess for appropriate maternal/infant bonding   -Encourage maternal/infant bonding opportunities  - Referral to  or  as needed  Outcome: Completed  Goal: Establishment of infant feeding pattern  Description: INTERVENTIONS:  - Assess breast/bottle feeding  - Refer to lactation as needed  Outcome: Completed  Goal: Incision(s), wounds(s) or drain site(s) healing without S/S of infection  Description: INTERVENTIONS  - Assess and document risk factors for skin impairment   - Assess and document dressing, incision, wound bed, drain sites and surrounding tissue  - Consider nutrition services referral as needed  - Oral mucous membranes remain intact  - Provide patient/ family education  Outcome: Completed

## 2021-05-28 NOTE — PROGRESS NOTES
Progress Note - OB/GYN   Bess Allan 27 y o  female MRN: [id number]  Unit/Bed#: -01 Encounter: [id number]    Assessment:  PPD#1 s/p Spontaneous Vaginal Delivery, stable    Plan:    1) Postpartum care  Encourage ambulation   Encourage breastfeeding  Continue current meds   2) A2GDM   F/u outpatient for GTT  3) Contraception    to get vasectomy  4) Disposition   Anticipate discharge home today if baby cleared to go   Her other children have sports activities and she would like to attend if possible, childcare coordination  Subjective/Objective     Subjective:     Pain: no  Tolerating PO: yes  Voiding: yes  Flatus: yes  BM: yes  Ambulating: yes  Breastfeeding: Bottle feeding  Chest pain: no  Shortness of breath: no  Leg pain: no  Lochia: minimal    Objective:     Vitals:  Vitals:    05/27/21 1803 05/27/21 1950 05/28/21 0000 05/28/21 0400   BP: 122/74 114/55 110/60 105/59   BP Location: Right arm Right arm Right arm Right arm   Pulse: 99 88 83 80   Resp: 20 18 18 18   Temp: 99 3 °F (37 4 °C) 98 6 °F (37 °C) 99 1 °F (37 3 °C) 98 4 °F (36 9 °C)   TempSrc: Oral Oral Oral Oral   SpO2: 99% 98%     Weight:       Height:           Physical Exam:   GEN: appears well, alert and oriented x 3, pleasant and cooperative   CV: +S1, +S2, no murmurs/rubs/gallops appreciated  RESP: no labored breathing  ABDOMEN: soft, no tenderness, no distention, Uterine fundus firm and non-tender, -1 cm below the umbilicus   EXTREMITIES: non-tender  NEURO Alert and oriented to person, place, and time         Lab Results   Component Value Date    WBC 9 66 05/27/2021    HGB 12 0 05/27/2021    HCT 35 3 05/27/2021    MCV 89 05/27/2021     05/27/2021         Anamaria Tapia DO, PGY-1  Obstetrics & Gynecology  05/28/21

## 2021-06-02 LAB — PLACENTA IN STORAGE: NORMAL

## 2021-06-23 ENCOUNTER — POSTPARTUM VISIT (OUTPATIENT)
Dept: OBGYN CLINIC | Facility: CLINIC | Age: 31
End: 2021-06-23

## 2021-06-23 VITALS
WEIGHT: 190 LBS | DIASTOLIC BLOOD PRESSURE: 80 MMHG | HEIGHT: 65 IN | BODY MASS INDEX: 31.65 KG/M2 | SYSTOLIC BLOOD PRESSURE: 110 MMHG

## 2021-06-23 DIAGNOSIS — Z86.32 HISTORY OF GESTATIONAL DIABETES: ICD-10-CM

## 2021-06-23 PROCEDURE — 3008F BODY MASS INDEX DOCD: CPT | Performed by: OBSTETRICS & GYNECOLOGY

## 2021-06-23 PROCEDURE — 99024 POSTOP FOLLOW-UP VISIT: CPT | Performed by: OBSTETRICS & GYNECOLOGY

## 2021-06-23 NOTE — PROGRESS NOTES
Subjective     Lisa Stokes is a 27 y o  female who presents for a postpartum visit  She is 4 weeks postpartum following a spontaneous vaginal delivery  I have fully reviewed the prenatal and intrapartum course  The delivery was at 45 gestational weeks  Outcome: spontaneous vaginal delivery  Anesthesia: epidural  Postpartum course has been  stable  Baby's course has been stable  Baby is feeding by bottle  Bleeding no bleeding  Bowel function is normal  Bladder function is normal  Patient is sexually active  Contraception method is vasectomy  Postpartum depression screening: negative  The following portions of the patient's history were reviewed and updated as appropriate: allergies, current medications, past family history, past medical history, past social history, past surgical history and problem list     Review of Systems  Pertinent items are noted in HPI       Objective     /80 (BP Location: Left arm, Patient Position: Sitting, Cuff Size: Standard)   Ht 5' 5" (1 651 m)   Wt 86 2 kg (190 lb)   LMP 09/01/2020 (Exact Date)   BMI 31 62 kg/m²    General:  alert and oriented, in no acute distress    Breasts:  negative   Lungs: clear to auscultation bilaterally   Heart:  regular rate and rhythm, S1, S2 normal, no murmur, click, rub or gallop   Abdomen: soft, non-tender; bowel sounds normal; no masses,  no organomegaly    Vulva:    Vagina:    Cervix:     Corpus:    Adnexa:     Rectal Exam:      Assessment/Plan      50-year-old female  Status post vaginal delivery   Partner is having vasectomy   History of gestational diabetes  Bottle feeding   Plan   75 g glucose   Prenatal vitamin daily   Return to office in 3 months for annual exam

## 2021-07-17 ENCOUNTER — APPOINTMENT (OUTPATIENT)
Dept: LAB | Facility: CLINIC | Age: 31
End: 2021-07-17
Payer: COMMERCIAL

## 2021-07-17 DIAGNOSIS — Z86.32 HISTORY OF GESTATIONAL DIABETES: ICD-10-CM

## 2021-07-17 LAB
GLUCOSE 2H P 75 G GLC PO SERPL-MCNC: 89 MG/DL (ref 65–139)
GLUCOSE P FAST SERPL-MCNC: 91 MG/DL (ref 65–99)

## 2021-07-17 PROCEDURE — 36415 COLL VENOUS BLD VENIPUNCTURE: CPT

## 2021-07-17 PROCEDURE — 82947 ASSAY GLUCOSE BLOOD QUANT: CPT

## 2021-07-17 PROCEDURE — 82950 GLUCOSE TEST: CPT

## 2021-10-25 ENCOUNTER — ANNUAL EXAM (OUTPATIENT)
Dept: OBGYN CLINIC | Facility: CLINIC | Age: 31
End: 2021-10-25
Payer: COMMERCIAL

## 2021-10-25 VITALS
BODY MASS INDEX: 30.16 KG/M2 | SYSTOLIC BLOOD PRESSURE: 122 MMHG | WEIGHT: 181 LBS | DIASTOLIC BLOOD PRESSURE: 64 MMHG | HEIGHT: 65 IN

## 2021-10-25 DIAGNOSIS — N92.0 MENORRHAGIA WITH REGULAR CYCLE: ICD-10-CM

## 2021-10-25 DIAGNOSIS — Z12.4 ENCOUNTER FOR SCREENING FOR CERVICAL CANCER: ICD-10-CM

## 2021-10-25 DIAGNOSIS — Z01.419 WOMEN'S ANNUAL ROUTINE GYNECOLOGICAL EXAMINATION: Primary | ICD-10-CM

## 2021-10-25 PROCEDURE — G0145 SCR C/V CYTO,THINLAYER,RESCR: HCPCS | Performed by: OBSTETRICS & GYNECOLOGY

## 2021-10-25 PROCEDURE — S0612 ANNUAL GYNECOLOGICAL EXAMINA: HCPCS | Performed by: OBSTETRICS & GYNECOLOGY

## 2021-10-25 PROCEDURE — G0476 HPV COMBO ASSAY CA SCREEN: HCPCS | Performed by: OBSTETRICS & GYNECOLOGY

## 2021-10-25 RX ORDER — DIPHENOXYLATE HYDROCHLORIDE AND ATROPINE SULFATE 2.5; .025 MG/1; MG/1
1 TABLET ORAL DAILY
COMMUNITY

## 2021-10-25 RX ORDER — TRANEXAMIC ACID 650 1/1
1300 TABLET ORAL 3 TIMES DAILY
Qty: 18 TABLET | Refills: 5 | Status: SHIPPED | OUTPATIENT
Start: 2021-10-25 | End: 2021-10-28

## 2021-10-27 LAB
HPV HR 12 DNA CVX QL NAA+PROBE: NEGATIVE
HPV16 DNA CVX QL NAA+PROBE: NEGATIVE
HPV18 DNA CVX QL NAA+PROBE: NEGATIVE

## 2021-11-01 LAB
LAB AP GYN PRIMARY INTERPRETATION: NORMAL
LAB AP LMP: NORMAL
Lab: NORMAL

## 2022-03-29 ENCOUNTER — APPOINTMENT (OUTPATIENT)
Dept: LAB | Facility: CLINIC | Age: 32
End: 2022-03-29
Payer: COMMERCIAL

## 2022-03-29 DIAGNOSIS — N91.2 AMENORRHEA: ICD-10-CM

## 2022-03-29 LAB — B-HCG SERPL-ACNC: 5174 MIU/ML

## 2022-03-29 PROCEDURE — 84702 CHORIONIC GONADOTROPIN TEST: CPT

## 2022-03-29 PROCEDURE — 84144 ASSAY OF PROGESTERONE: CPT

## 2022-03-29 PROCEDURE — 36415 COLL VENOUS BLD VENIPUNCTURE: CPT

## 2022-03-30 LAB — PROGEST SERPL-MCNC: 17.4 NG/ML

## 2022-04-04 ENCOUNTER — OFFICE VISIT (OUTPATIENT)
Dept: URGENT CARE | Facility: MEDICAL CENTER | Age: 32
End: 2022-04-04
Payer: COMMERCIAL

## 2022-04-04 VITALS
BODY MASS INDEX: 30.99 KG/M2 | SYSTOLIC BLOOD PRESSURE: 122 MMHG | TEMPERATURE: 97.9 F | WEIGHT: 186.2 LBS | RESPIRATION RATE: 12 BRPM | DIASTOLIC BLOOD PRESSURE: 71 MMHG | HEART RATE: 100 BPM | OXYGEN SATURATION: 99 %

## 2022-04-04 DIAGNOSIS — J06.9 ACUTE URI: Primary | ICD-10-CM

## 2022-04-04 PROCEDURE — 87636 SARSCOV2 & INF A&B AMP PRB: CPT | Performed by: PHYSICIAN ASSISTANT

## 2022-04-04 PROCEDURE — 99213 OFFICE O/P EST LOW 20 MIN: CPT | Performed by: PHYSICIAN ASSISTANT

## 2022-04-04 NOTE — PATIENT INSTRUCTIONS
1  Over-the-counter acetaminophen as needed for pain or fever  2  Oxymetazoline nasal spray 2 sprays in each nostril every 12 hours for no more than the next 5 days as needed for nasal congestion  3  Over-the-counter guaifenesin as needed for mucus relief  4  Gargle salt water as needed for sore throat relief  5  Increase oral fluid consumption  6  Follow-up with primary care provider in 7 days for any persistent symptoms      7  Quarantine the pending the results of your COVID/flu test

## 2022-04-04 NOTE — PROGRESS NOTES
[facility]        NAME: Clifford Sanchez is a 34 y o  female  : 1990    MRN: [id number]  DATE: 2022  TIME: 9:01 AM    Assessment and Plan   Acute URI [J06 9]  1  Acute URI  Covid19 and INFLUENZA A/B PCR         Patient Instructions     1  Over-the-counter acetaminophen as needed for pain or fever  2  Oxymetazoline nasal spray 2 sprays in each nostril every 12 hours for no more than the next 5 days as needed for nasal congestion  3  Over-the-counter guaifenesin as needed for mucus relief  4  Gargle salt water as needed for sore throat relief  5  Increase oral fluid consumption  6  Follow-up with primary care provider in 7 days for any persistent symptoms  7  Quarantine the pending the results of your COVID/flu test       Chief Complaint     Chief Complaint   Patient presents with    Nasal Congestion     started over the weekend         History of Present Illness       27-year-old female patient with a 2 day history of nasal congestion, postnasal drip  She denies any fever, chills, body aches, no cough  Her infant daughter has similar symptoms with fever  Review of Systems   Review of Systems   Constitutional: Negative for fever  HENT: Positive for congestion and rhinorrhea  Negative for facial swelling, sinus pain and sore throat  Respiratory: Negative for cough  Cardiovascular: Negative for chest pain  Gastrointestinal: Negative for abdominal pain  Musculoskeletal: Negative for myalgias  Skin: Negative for rash           Current Medications       Current Outpatient Medications:     acetaminophen (TYLENOL) 325 mg tablet, Take 2 tablets (650 mg total) by mouth every 4 (four) hours as needed for mild pain (Patient not taking: Reported on 2021), Disp:  , Rfl: 0    benzocaine-menthol-lanolin-aloe (DERMOPLAST) 20-0 5 % topical spray, Apply 1 application topically 2 (two) times a day as needed (perineal discomfort) (Patient not taking: Reported on 2021), Disp: , Rfl: 0    Blood Glucose Monitoring Suppl (OneTouch Verio Flex System) w/Device KIT, Ttest 4 times daily, Disp: 1 kit, Rfl: 0    famotidine (PEPCID) 20 mg tablet, Take 1 tablet (20 mg total) by mouth 2 (two) times a day (Patient not taking: Reported on 6/23/2021), Disp: 30 tablet, Rfl: 2    hydrocortisone 1 % cream, Apply 1 application topically 2 (two) times a day (Patient not taking: Reported on 6/23/2021), Disp: 30 g, Rfl: 0    ibuprofen (MOTRIN) 600 mg tablet, Take 1 tablet (600 mg total) by mouth every 6 (six) hours as needed for moderate pain (cramping) (Patient not taking: Reported on 6/23/2021), Disp: 30 tablet, Rfl: 0    metoclopramide (REGLAN) 10 mg tablet, Take 5 mg by mouth as needed , Disp: , Rfl:     multivitamin (THERAGRAN) TABS, Take 1 tablet by mouth daily (Patient not taking: Reported on 4/4/2022 ), Disp: , Rfl:     ondansetron (ZOFRAN) 4 mg tablet, Take 1 tablet (4 mg total) by mouth every 8 (eight) hours as needed for nausea or vomiting (Patient not taking: Reported on 4/4/2022 ), Disp: 20 tablet, Rfl: 0    Prenatal Vit-Fe Fumarate-FA (PRENATAL VITAMIN PO), Take by mouth (Patient not taking: Reported on 6/23/2021), Disp: , Rfl:     Progesterone 100 MG CAPS, Take 1 capsule by mouth in the morning, Disp: 30 capsule, Rfl: 1    Current Allergies     Allergies as of 04/04/2022 - Reviewed 04/04/2022   Allergen Reaction Noted    Dye [iodinated diagnostic agents] Hives 05/01/2020            The following portions of the patient's history were reviewed and updated as appropriate: allergies, current medications, past family history, past medical history, past social history, past surgical history and problem list      Past Medical History:   Diagnosis Date    Anemia     no meds     COVID-19 affecting pregnancy in second trimester 12/27/21    Gestational diabetes     gestational last pregnancy and prediabetes 2018- Resolved with 40 lb weight loss    Gestational diabetes mellitus (GDM) in third trimester 5/17/2021    IBS (irritable bowel syndrome)     Migraine     occular migranes no meds     Numerous skin moles     pre cancerous moles removed     Urinary tract infection     Varicella     had a child        Past Surgical History:   Procedure Laterality Date    COLONOSCOPY  01/01/2012    negative    MOLE REMOVAL      SKIN TAG REMOVAL      removal of pre-cancerous moles x 2       Family History   Problem Relation Age of Onset    Fibroids Mother     Lymphoma Father 55        doing well - cancer free 2017    Hypertension Maternal Grandmother     Diverticulitis Maternal Grandmother     Diabetes Maternal Grandfather     No Known Problems Sister     No Known Problems Brother     ROSEANN disease Daughter     No Known Problems Paternal Grandmother     No Known Problems Paternal Grandfather     No Known Problems Sister     Speech disorder Daughter          Medications have been verified  Objective   There were no vitals taken for this visit  Physical Exam     Physical Exam  Vitals and nursing note reviewed  Constitutional:       General: She is not in acute distress  Appearance: Normal appearance  She is not ill-appearing or toxic-appearing  HENT:      Head: Normocephalic  Right Ear: Tympanic membrane normal       Left Ear: Tympanic membrane normal       Nose: Congestion present  Mouth/Throat:      Mouth: Mucous membranes are moist    Eyes:      Conjunctiva/sclera: Conjunctivae normal       Pupils: Pupils are equal, round, and reactive to light  Cardiovascular:      Rate and Rhythm: Normal rate and regular rhythm  Pulses: Normal pulses  Pulmonary:      Effort: Pulmonary effort is normal       Breath sounds: Normal breath sounds  Musculoskeletal:      Cervical back: Normal range of motion  Skin:     General: Skin is warm and dry  Neurological:      Mental Status: She is alert     Psychiatric:         Mood and Affect: Mood normal          Behavior: Behavior normal

## 2022-04-05 LAB
FLUAV RNA RESP QL NAA+PROBE: NEGATIVE
FLUBV RNA RESP QL NAA+PROBE: NEGATIVE
SARS-COV-2 RNA RESP QL NAA+PROBE: NEGATIVE

## 2022-04-13 ENCOUNTER — OFFICE VISIT (OUTPATIENT)
Dept: OBGYN CLINIC | Facility: CLINIC | Age: 32
End: 2022-04-13
Payer: COMMERCIAL

## 2022-04-13 VITALS
SYSTOLIC BLOOD PRESSURE: 124 MMHG | DIASTOLIC BLOOD PRESSURE: 82 MMHG | BODY MASS INDEX: 30.82 KG/M2 | WEIGHT: 185 LBS | HEIGHT: 65 IN

## 2022-04-13 DIAGNOSIS — Z36.9 FIRST TRIMESTER SCREENING: ICD-10-CM

## 2022-04-13 DIAGNOSIS — Z11.3 ROUTINE SCREENING FOR STI (SEXUALLY TRANSMITTED INFECTION): Primary | ICD-10-CM

## 2022-04-13 PROCEDURE — 87491 CHLMYD TRACH DNA AMP PROBE: CPT | Performed by: OBSTETRICS & GYNECOLOGY

## 2022-04-13 PROCEDURE — 1036F TOBACCO NON-USER: CPT | Performed by: OBSTETRICS & GYNECOLOGY

## 2022-04-13 PROCEDURE — 99213 OFFICE O/P EST LOW 20 MIN: CPT | Performed by: OBSTETRICS & GYNECOLOGY

## 2022-04-13 PROCEDURE — 87591 N.GONORRHOEAE DNA AMP PROB: CPT | Performed by: OBSTETRICS & GYNECOLOGY

## 2022-04-13 RX ORDER — DIPHENHYDRAMINE HCL 25 MG
25 TABLET ORAL EVERY 6 HOURS PRN
COMMUNITY

## 2022-04-13 NOTE — PROGRESS NOTES
Assessment/Plan:     Diagnoses and all orders for this visit:    First trimester screening  -     Ambulatory Referral to Maternal Fetal Medicine; Future    Other orders  -     diphenhydrAMINE (BENADRYL) 25 mg tablet; Take 25 mg by mouth every 6 (six) hours as needed for itching        74-year-old female  Amenorrhea/positive pregnancy test  Bedside ultrasound  Menorrhagia first day of menses  History of gestational diabetes  Plan   GC/CT  Repeat ultrasound in 1 week confirm viability  Continue prenatal vitamin daily  Return to office for OB intake after ultrasound  Subjective:      Patient ID: Mike Vanessa is a 34 y o  female  HPI  33 yo female   lmp 2/17 did pregnancy test at home was positive  Patient presents to the office today for pregnancy confirmation denies any vaginal bleeding denies any nausea vomiting denies any diarrhea or constipation complaining of mild acid reflux diet modification discussed as well as she can try Tums if no respond consider Pepcid  Patient has history of gestational diabetes in the prior pregnancy consider early gestational diabetes screening and this pregnancy  Patient is currently not breastfeeding she is taking prenatal vitamin daily      The following portions of the patient's history were reviewed and updated as appropriate: allergies, current medications, past family history, past medical history, past social history, past surgical history and problem list     Review of Systems      Objective:      /82 (BP Location: Left arm, Patient Position: Sitting, Cuff Size: Standard)   Ht 5' 5" (1 651 m)   Wt 83 9 kg (185 lb)   BMI 30 79 kg/m²          Physical Exam  Constitutional:       Appearance: She is well-developed  Abdominal:      General: There is no distension  Palpations: Abdomen is soft  Tenderness: There is no abdominal tenderness  Genitourinary:     Labia:         Right: No rash, tenderness or lesion  Left: No rash, tenderness or lesion  Vagina: No signs of injury  No vaginal discharge, erythema or tenderness  Cervix: No cervical motion tenderness, discharge or friability  Adnexa:         Right: No mass, tenderness or fullness  Left: No mass, tenderness or fullness  Comments: Bedside ultrasound performed small gestational sac noted 5 weeks with small fetal pole no fetal heart rate noted recommend repeat ultrasound in 1 weeks to confirm viability  Neurological:      Mental Status: She is alert and oriented to person, place, and time     Psychiatric:         Behavior: Behavior normal

## 2022-04-15 LAB
C TRACH DNA SPEC QL NAA+PROBE: NEGATIVE
N GONORRHOEA DNA SPEC QL NAA+PROBE: NEGATIVE

## 2022-04-21 ENCOUNTER — HOSPITAL ENCOUNTER (OUTPATIENT)
Dept: RADIOLOGY | Facility: MEDICAL CENTER | Age: 32
Discharge: HOME | End: 2022-04-21
Payer: COMMERCIAL

## 2022-04-21 DIAGNOSIS — Z36.9 FIRST TRIMESTER SCREENING: ICD-10-CM

## 2022-04-21 PROCEDURE — 76801 OB US < 14 WKS SINGLE FETUS: CPT

## 2022-04-27 ENCOUNTER — TELEPHONE (OUTPATIENT)
Dept: OBGYN CLINIC | Facility: CLINIC | Age: 32
End: 2022-04-27

## 2022-04-27 ENCOUNTER — OFFICE VISIT (OUTPATIENT)
Dept: OBGYN CLINIC | Facility: CLINIC | Age: 32
End: 2022-04-27
Payer: COMMERCIAL

## 2022-04-27 VITALS
BODY MASS INDEX: 31.12 KG/M2 | HEIGHT: 65 IN | DIASTOLIC BLOOD PRESSURE: 90 MMHG | SYSTOLIC BLOOD PRESSURE: 140 MMHG | WEIGHT: 186.8 LBS

## 2022-04-27 DIAGNOSIS — O03.9 MISCARRIAGE: Primary | ICD-10-CM

## 2022-04-27 PROCEDURE — 99213 OFFICE O/P EST LOW 20 MIN: CPT | Performed by: OBSTETRICS & GYNECOLOGY

## 2022-04-27 PROCEDURE — 3008F BODY MASS INDEX DOCD: CPT | Performed by: OBSTETRICS & GYNECOLOGY

## 2022-04-27 NOTE — TELEPHONE ENCOUNTER
A positive blood type, early gestation with bleeding, she is not saturating a pod an hour appointment made for today, she will talk with her boss about leaving early

## 2022-04-28 NOTE — PROGRESS NOTES
Assessment/Plan:     Diagnoses and all orders for this visit:    Miscarriage  -     US pelvis complete w transvaginal; Future      76-year-old female   Spontaneous miscarriage in process blood type A positive  History of gestational diabetes   Prior 3 vaginal delivery  Plan   Repeat ultrasound in 2 weeks  If desire to conceive to continue prenatal vitamin daily if desire contraception to return to office in 2 weeks to discuss contraception option plan explained and discussed with patient all patient questions answered and patient was satisfied    Subjective:      Patient ID: Evelyn Virgen is a 34 y o  female  HPI  76-year-old female presents to the office today secondary to heavy vaginal bleeding patient was having positive pregnancy test did ultrasound on April 13 small gestational sac noted, patient has repeated ultrasound on 04/21 same finding noted patient started to have heavy vaginal bleeding and passing clots yesterday large clots then started bleeding after bleeding decreased today  Denies any nausea or vomiting denies any dizziness fatigue   Patient was emotional at the visit  Patient blood type is positive  The following portions of the patient's history were reviewed and updated as appropriate: allergies, current medications, past family history, past medical history, past social history, past surgical history and problem list     Review of Systems   Constitutional: Negative for activity change, appetite change, chills, fatigue and fever  Respiratory: Negative for cough and shortness of breath  Cardiovascular: Negative for chest pain, palpitations and leg swelling  Gastrointestinal: Negative for abdominal pain, constipation, diarrhea, nausea and vomiting  Genitourinary: Positive for vaginal bleeding  Negative for difficulty urinating, dysuria, flank pain, frequency, hematuria, urgency and vaginal discharge  Neurological: Negative for dizziness and headaches     Psychiatric/Behavioral: Negative for confusion  Objective:      /90 (BP Location: Left arm, Patient Position: Sitting, Cuff Size: Standard)   Ht 5' 5" (1 651 m)   Wt 84 7 kg (186 lb 12 8 oz)   BMI 31 09 kg/m²          Physical Exam  Constitutional:       Appearance: She is well-developed  Abdominal:      General: There is no distension  Palpations: Abdomen is soft  Tenderness: There is no abdominal tenderness  Genitourinary:     Labia:         Right: No rash, tenderness or lesion  Left: No rash, tenderness or lesion  Vagina: No signs of injury  Bleeding present  No vaginal discharge, erythema or tenderness  Cervix: No cervical motion tenderness, discharge or friability  Adnexa:         Right: No mass, tenderness or fullness  Left: No mass, tenderness or fullness  Comments: Cervix noted to be dilated 2 cm bloody vaginal discharge noted  Neurological:      Mental Status: She is alert and oriented to person, place, and time     Psychiatric:         Behavior: Behavior normal

## 2022-10-26 ENCOUNTER — ANNUAL EXAM (OUTPATIENT)
Dept: OBGYN CLINIC | Facility: CLINIC | Age: 32
End: 2022-10-26
Payer: COMMERCIAL

## 2022-10-26 VITALS
DIASTOLIC BLOOD PRESSURE: 80 MMHG | WEIGHT: 185 LBS | HEIGHT: 65 IN | SYSTOLIC BLOOD PRESSURE: 120 MMHG | BODY MASS INDEX: 30.82 KG/M2

## 2022-10-26 DIAGNOSIS — N91.2 AMENORRHEA: ICD-10-CM

## 2022-10-26 DIAGNOSIS — Z12.4 ENCOUNTER FOR PAPANICOLAOU SMEAR FOR CERVICAL CANCER SCREENING: ICD-10-CM

## 2022-10-26 DIAGNOSIS — Z01.419 WOMEN'S ANNUAL ROUTINE GYNECOLOGICAL EXAMINATION: Primary | ICD-10-CM

## 2022-10-26 LAB — SL AMB POCT URINE HCG: NEGATIVE

## 2022-10-26 PROCEDURE — 81025 URINE PREGNANCY TEST: CPT | Performed by: OBSTETRICS & GYNECOLOGY

## 2022-10-26 PROCEDURE — 99395 PREV VISIT EST AGE 18-39: CPT | Performed by: OBSTETRICS & GYNECOLOGY

## 2022-10-26 PROCEDURE — G0476 HPV COMBO ASSAY CA SCREEN: HCPCS | Performed by: OBSTETRICS & GYNECOLOGY

## 2022-10-26 NOTE — PROGRESS NOTES
Subjective      Jordan Humphries is a 34 y o  female who presents for annual well woman exam  Periods are regular every 28-30 days, lasting 3 days  No intermenstrual bleeding, spotting, or discharge  Current contraception: none  History of abnormal Pap smear: no    Menstrual History:  OB History        4    Para   3    Term   3            AB   1    Living   3       SAB   1    IAB   0    Ectopic   0    Multiple   0    Live Births   3                  Patient's last menstrual period was 2022 (approximate)  The following portions of the patient's history were reviewed and updated as appropriate: allergies, current medications, past family history, past medical history, past social history, past surgical history and problem list     Review of Systems  Review of Systems   Constitutional: Negative for activity change, appetite change, chills, fatigue and fever  Respiratory: Negative for cough and shortness of breath  Cardiovascular: Negative for chest pain, palpitations and leg swelling  Gastrointestinal: Negative for abdominal pain, constipation, diarrhea, nausea and vomiting  Genitourinary: Negative for difficulty urinating, dysuria, flank pain, frequency, hematuria, urgency and vaginal discharge  Neurological: Negative for dizziness and headaches  Psychiatric/Behavioral: Negative for confusion            Objective      /80 (BP Location: Right arm, Patient Position: Sitting)   Ht 5' 5" (1 651 m)   Wt 83 9 kg (185 lb)   LMP 2022 (Approximate)   BMI 30 79 kg/m²     Physical Exam  OBGyn Exam     General:   alert and oriented, in no acute distress, alert, appears stated age and cooperative   Heart: regular rate and rhythm, S1, S2 normal, no murmur, click, rub or gallop   Lungs: clear to auscultation bilaterally   Abdomen: soft, non-tender, without masses or organomegaly   Vulva: normal   Vagina: normal mucosa, normal discharge   Cervix: no cervical motion tenderness and no lesions   Uterus: normal size   Adnexa:  Breast Exam:  normal adnexa  breasts appear normal, no suspicious masses, no skin or nipple changes or axillary nodes  Assessment      @well woman@   79-year-old female   Annual exam  History of Spontaneous miscarriage in process blood type A positive  History of gestational diabetes   Prior 3 vaginal delivery   Partner planning on vasectomy  Menorrhagia first day of menses  Plan   Pap/HPV  Diet/exercise  Calcium/vitamin-D   Prenatal vitamin if planning to conceive   Pelvic ultrasound secondary to lower back pain check for any gyn etiology  Massage heating pad muscle relaxants recommended for her back pain  Return to office for annual exam earlier if she got pregnant     All questions answered  There are no Patient Instructions on file for this visit

## 2022-11-03 LAB
LAB AP GYN PRIMARY INTERPRETATION: NORMAL
Lab: NORMAL

## 2023-02-11 NOTE — TELEPHONE ENCOUNTER
Appointment was made for 5/17/2021 OFFICE VISIT      Patient: Isatu Arroyo Date of Service: 2023   : 1984 MRN: [id number]     SUBJECTIVE:     Chief Complaint   Patient presents with   • Sore Throat   • Cough   • Congestion   • Office Visit   • Abdominal Pain     Supra pubic pressure   • Vaginal Discharge       HISTORY OF PRESENT ILLNESS:  Isatu Arroyo is a 38 year old female who presents today for evaluation of cold like symptoms, vaginal discharge and supra pubic pressure. Pt reports that she has been having a sore throat(pain scale of 4/10), nasal congestion and a cough since yesterday. She noticed some white spots on her tonsils and wanted to r/o strep.     Pt has asthma. She uses Advair inhaler but she thinks her inhaler is not working as she has been having night time wheezing for the past 3 weeks. Pt said she used her mom's Spiriva inhaler and she thinks its helping her.     Pt also has been having some supra pubic pressure for the past 2 days with some vaginal drainage(yellow colored with no bad odor). She is not sexually active currently. Pt has overactive bladder and she takes oxybutynin. Pt reports that she is always worried about getting UTI and would like to get tested for UTI. Had kidney stones (left kidney) 2 months ago and was hospitalized for that. Denies n/v, flank pain or low back pain.       Past Medical History:   Diagnosis Date   • Acid reflux 2016    Well controlled. Hx of PPI intake.   • Adenomyosis 2019    Noted on Pelvic US 2018. Declines Mirena.    • Depression with anxiety 2016    Currently on Escitalopram 10 mg daily. Denies SI/HI. Hx of Sertraline without relief.   • Endometriosis of cervix    • Intermittent diarrhea 2019    Associated w/ anxiety. Hx of IBS. Normal EGD/Cscope 3/2018. Negative celiac panel. NO hematochezia, melena, or weight changes.   • Intractable migraine without aura and with status migrainosus 2018    Stable. Follows with Neurology. Meds: Topomax 25  mg QAM and 100 mg QPM. Triptan and Flexeril as needed. No recent botox.   • Mild intermittent asthma 05/02/2016     Albuterol & Montelukast as needed.   • Ovarian cyst    • Rosacea 09/21/2017    Hx of erythromycin gel. Desires to try Metrogel.   • Sjogren's syndrome (CMS/HCC) 06/08/2012    Follows with rheumatology. Hx of intolerance to Pilocarpine, Plaquenil.    • Urinary incontinence        Current Outpatient Medications   Medication Sig   • benzonatate (TESSALON PERLES) 100 MG capsule Take 1 capsule by mouth 3 times daily as needed for Cough.   • montelukast (SINGULAIR) 10 MG tablet TAKE 1 TABLET BY MOUTH AT BEDTIME   • venlafaxine XR (EFFEXOR XR) 75 MG 24 hr capsule TAKE 1 CAPSULE BY MOUTH DAILY   • tamsulosin (FLOMAX) 0.4 MG Cap Take 1 capsule by mouth nightly for 14 days.   • ketorolac (TORADOL) 10 MG tablet Take 1 tablet by mouth every 6 hours as needed for Pain.   • Magnesium 400 MG Tab Take 400 mg by mouth daily.   • B Complex Vitamins (B COMPLEX PO) Take 1 tablet by mouth daily.   • Ubrogepant 50 MG Tab Take 50 mg by mouth as needed (migraine).   • cyclobenzaprine (FLEXERIL) 5 MG tablet Take 1 tablet by mouth daily as needed for Muscle spasms (migraine).   • topiramate (TOPAMAX) 100 MG tablet TAKE 1/2 TABLET BY MOUTH EVERY MORNING AND 1 TABLET EVERY EVENING   • etonogestrel-ethinyl estradiol (NuvaRing) 0.12-0.015 MG/24HR vaginal ring INSERT VAGINALLY FOR 21 DAYS, REINSERT NEW DEVICE 7 DAYS AFTER REMOVAL   • nitrofurantoin, macrocrystal-monohydrate, (MACROBID) 100 MG capsule Take 1 capsule by mouth as needed (Take 1 capsule with each episode of intercourse).   • oxybutynin (DITROPAN-XL) 5 MG 24 hr tablet Take 1 tablet by mouth daily.   • galcanezumab-gnlm (Emgality) 120 MG/ML injection Inject 2 syringes on day 1. Then, inject 1 syringe monthly thereafter.   • fluticasone-salmeterol 100-50 MCG/DOSE inhaler Inhale 1 puff into the lungs two times daily.   • ketoconazole (NIZORAL) 2 % shampoo Wash scalp and  leave on for 5 min then rinse 2 times weekly   • clindamycin (Cleocin-T) 1 % topical solution Apply topically 2 times daily. (Patient taking differently: Apply 1 application topically 2 times daily as needed.)   • rizatriptan (MAXALT) 10 MG tablet Take 1 tablet by mouth as needed for Migraine. Take 1 tablet by mouth at onset of migraine. May repeat after 2 hours if needed.   • albuterol 108 (90 Base) MCG/ACT inhaler INHALE 1 TO 2 PUFFS EVERY 4 TO 6 HOURS AS NEEDED     No current facility-administered medications for this visit.       ALLERGIES:   Allergen Reactions   • Hydrocodone-Acetaminophen PRURITUS   • Contrast Media Other (See Comments)     unknown   • Gadobenate HIVES     Patient broke out in hives after injection of gadolinium.   • Latex Other (See Comments)     unknown   • No Name Available Other (See Comments)     No Known Drug Allergies       Past Surgical History:   Procedure Laterality Date   • Sinus surgery         Family History   Problem Relation Age of Onset   • Rheumatoid Arthritis Mother    • Cancer, Breast Maternal Grandmother 45   • Rheumatoid Arthritis Maternal Grandmother    • Multiple Sclerosis Other        Social History     Tobacco Use   Smoking Status Never   Smokeless Tobacco Never       Immunization History   Administered Date(s) Administered   • COVID Pfizer 12Y+ (Requires Dilution) 03/13/2021, 04/03/2021   • COVID Pfizer Bivalent Booster 12Y+ 10/07/2022   • Influenza, quadrivalent, preserve-free 02/18/2019, 09/08/2021   • Influenza, seasonal, injectable, preservative free 10/17/2011, 09/10/2014   • Influenza, seasonal, injectable, trivalent 10/17/2011, 09/10/2014   • PPD 09/10/2014, 09/10/2014   • Tdap 01/12/2009, 09/10/2014, 05/28/2021       Review of Systems   Constitutional: Negative for chills, fatigue and fever.   HENT: Positive for congestion, postnasal drip, sore throat and trouble swallowing. Negative for ear discharge, ear pain, rhinorrhea, sinus pressure and sinus pain.     Eyes: Negative for pain, discharge, redness and itching.   Respiratory: Positive for cough and chest tightness. Negative for apnea, choking, shortness of breath and wheezing.    Cardiovascular: Negative for palpitations.   Gastrointestinal:        Supra pubic pressure   Genitourinary: Positive for vaginal discharge. Negative for dysuria, flank pain, frequency and urgency.        Supra pubic pressure   Musculoskeletal: Negative for back pain.         OBJECTIVE:     Visit Vitals  /74 (BP Location: RUE - Right upper extremity, Patient Position: Sitting, Cuff Size: Regular)   Pulse 72   Temp 98.6 °F (37 °C) (Oral)   Resp 16   LMP 01/22/2023   SpO2 100%        Physical Exam  Vitals reviewed.   Constitutional:       General: She is not in acute distress.     Appearance: Normal appearance.   HENT:      Head: Normocephalic and atraumatic.      Right Ear: Tympanic membrane, ear canal and external ear normal.      Left Ear: Tympanic membrane, ear canal and external ear normal.      Nose: Congestion present.      Mouth/Throat:      Mouth: Mucous membranes are moist.      Pharynx: Posterior oropharyngeal erythema present. No oropharyngeal exudate.   Eyes:      Extraocular Movements: Extraocular movements intact.      Conjunctiva/sclera: Conjunctivae normal.      Pupils: Pupils are equal, round, and reactive to light.   Cardiovascular:      Rate and Rhythm: Normal rate and regular rhythm.      Pulses: Normal pulses.      Heart sounds: Normal heart sounds.   Pulmonary:      Effort: Pulmonary effort is normal. No respiratory distress.      Breath sounds: Normal breath sounds. No wheezing, rhonchi or rales.   Abdominal:      General: There is no distension.      Tenderness: There is no right CVA tenderness or left CVA tenderness.   Genitourinary:     Comments: Vaginal self swab obtained for vaginitis panel testing. Urine obtained for UA and urine culture testing.  Musculoskeletal:      Cervical back: Normal range of motion  and neck supple.   Lymphadenopathy:      Cervical: No cervical adenopathy.   Neurological:      Mental Status: She is alert.         DIAGNOSTIC STUDIES:   LAB RESULTS:  Results for orders placed or performed in visit on 02/11/23   POCT RAPID STREP A   Result Value    GRP A STREP Negative    Internal Procedural Controls Acceptable Yes    TEST LOT NUMBER [id number]    TEST LOT EXPIRATION DATE 04/14/2024   POCT URINE DIP NON-AUTO   Result Value    POCT Color Straw    POCT Appearance Cloudy    POCT Glucose Urine Negative    POCT Bilirubin Negative    POCT Ketones Negative    POCT Specific Orgas 1.015    POCT Occult Blood Small (A)    POCT pH 6.5    POCT Protein Trace (A)    POCT Urobilinogen 0.2    Urine Nitrite Negative    WBC (Leukocyte) Esterase POC Small (A)       ASSESSMENT AND PLAN:     Problem List Items Addressed This Visit    None  Visit Diagnoses     Abdominal pain, suprapubic    -  Primary    Relevant Orders    POCT URINE DIP NON-AUTO (Completed)    URINE, BACTERIAL CULTURE    Viral URI with cough        Relevant Orders    POCT RAPID STREP A (Completed)    Vaginal discharge        Relevant Orders    SWABONE VAGINITIS PANEL        Isatu was seen today for sore throat, cough, congestion, office visit, abdominal pain and vaginal discharge.    Diagnoses and all orders for this visit:    Abdominal pain, suprapubic  -     POCT URINE DIP NON-AUTO  -     URINE, BACTERIAL CULTURE    Viral URI with cough  -     POCT RAPID STREP A    Vaginal discharge  -     SWABONE VAGINITIS PANEL    Other orders  -     benzonatate (TESSALON PERLES) 100 MG capsule; Take 1 capsule by mouth 3 times daily as needed for Cough.      Viral URI  • Rapid strep completed in office is negative.    Discussed at length the natural variability of the viral timeline and that based on the symptoms and exam, patient’s condition still strongly indicates a viral illness. Explained to patient that upper respiratory infections otherwise known as URI  are caused by viruses. The typical course may last from 7-10 days. These viral URIs are not treatable with antibiotics as antibiotics will not kill viruses. Unnecessary use of antibiotics for viral illness can cause allergic reactions, diarrhea, or other gastrointestinal symptoms in patients.     We recommend the following supportive care for your symptoms:    For Sore Throat:  • May use over the counter Chloraseptic lozenges or spray per package instructions for relief of throat pain or may use Coldeeze throat lozenges with zinc  • May take tea with honey to relieve throat pain temporarily.  • Gargle with saltwater, drink warm beverages  • May take Tylenol, Motrin, or Aleve to relieve pain or fever  For Nasal/sinus Congestion:  • Take decongestants: Pseudoephedrine 60mg every 4-6hrs as needed OR take Mucinex D as per package directions with plenty of water (be cautious with Mucinex D as it may raise your BP)  • Use OTC Flonase nasal spray as per package directions for nasal/sinus congestion- Use 1-2 sprays to each nostril daily.   • May also use over the counter saline nasal sprays to keep nasal passages clean or may use Netipot for sinus congestion, never use tap water, use only bottles, distilled water. Use only packaged salt sold for Netipot, do not use table salts.   • Elevate head on 2-3 pillows while reclining to promote sinus drainage.  • Inhale humidified air (consider using cool mist ultrasonic humidifier) or steam inhalation to promote liquification of mucus.  For Cough:  • Take over the counter Mucinex (Guaifenesin) 600mg OR Mucinex DM for cough and chest congestion. Use as directed per package instructions. You may take Mucinex D if you have cough and sinus/nasal congestion but be cautious as Mucinex D may raise your blood pressure.  • May take over the counter Delsym (dextromethorphan) for dry nonproductive cough  • Use a clean humidifier or a cool mist vaporizer at nighttime. Increase oral fluid  intake (2-3 liters a day) to promote hydration and sinus drainage.  • Avoid alcohol, caffeine, and dairy products to avoid dehydration and worsening of mucus production.  • May take OTC Tylenol for fever, or ibuprofen for muscle aches/pain per package instructions. May use Ibuprofen 200-600 mg every 6-8 hrs as needed for pain. May use Acetaminophen 325-650mg every 4-6 hrs as needed for pain or fever.  • In future, with first sign of cold symptoms, start zinc lozenges or rapid melts every 3 hours for 48 hours to try and reduce cold symptoms and duration.  Also, recommend taking one anytime you fly on airplanes.  • Follow-up with primary care provider in one week if no improvement or sooner if worsening symptoms such as fevers, facial pain, tooth pain, or worsening congestion despite treatment.  • If symptoms worsen- increased fever (> than 101.3 degrees), vomiting, nausea, drooling, stiffness to the neck, difficulty swallowing, chest pain, or shortness of breath- GO IMMEDIATELY TO ER OR URGENT CARE!  • Discussed when to see PCP/Urgent Care and when to go to ER.    o If symptoms should suddenly change or worsen, seek immediate reevaluation at an Urgent Care or ER. Patient verbalized understanding.  Patient left the office in no acute distress and understanding the plan of care. All questions were answered to satisfaction    Suprapubic pressure  Urine dipstick done at the clinic today. Discussed UA results with pt. Pt doesn't wanted to be started on ABX today. States she will wait for th urine culture results and will start on ABX if the culture is positive. Encouraged pt to increase water intake and to go to ER if she develops radiating pain to flank/lower back with fever/chills to r/o kidney infection. Pt verbalized understanding.     Vaginal Discharge  Will send out a vaginal self swab(as swabbed by pt) to the [facility] and will follow up results with pt. Pt declined for GC/CT testing at this time as she is not  sexually active.       FOLLOW UP:  A urine culture was sent, you will receive a call in approx 48 hours with the results.    Patient Instructions provided as documented in the AVS.    Discussed with patient (parent) findings on exam. Discussed supportive care and treatment plan and that if symptoms are not getting better or feels symptoms are getting worse then they should have further evaluation. Patient (parent) verbalized understanding of the plan.     PRASAD SevillaC

## 2023-03-22 ENCOUNTER — OFFICE VISIT (OUTPATIENT)
Dept: FAMILY MEDICINE CLINIC | Facility: CLINIC | Age: 33
End: 2023-03-22

## 2023-03-22 VITALS
BODY MASS INDEX: 30.02 KG/M2 | HEART RATE: 96 BPM | DIASTOLIC BLOOD PRESSURE: 76 MMHG | TEMPERATURE: 98.1 F | WEIGHT: 180.2 LBS | OXYGEN SATURATION: 97 % | SYSTOLIC BLOOD PRESSURE: 122 MMHG | HEIGHT: 65 IN

## 2023-03-22 DIAGNOSIS — R73.9 ELEVATED BLOOD SUGAR: ICD-10-CM

## 2023-03-22 DIAGNOSIS — Z11.1 SCREENING-PULMONARY TB: ICD-10-CM

## 2023-03-22 DIAGNOSIS — R53.83 FATIGUE, UNSPECIFIED TYPE: ICD-10-CM

## 2023-03-22 DIAGNOSIS — Z11.59 NEED FOR HEPATITIS C SCREENING TEST: ICD-10-CM

## 2023-03-22 DIAGNOSIS — Z86.69 HISTORY OF MIGRAINE HEADACHES: ICD-10-CM

## 2023-03-22 DIAGNOSIS — D68.00 VON WILLEBRAND DISEASE (HCC): Primary | ICD-10-CM

## 2023-03-22 DIAGNOSIS — R23.3 EASY BRUISING: ICD-10-CM

## 2023-03-22 DIAGNOSIS — E55.9 VITAMIN D INSUFFICIENCY: ICD-10-CM

## 2023-03-22 RX ORDER — FERROUS SULFATE 325(65) MG
325 TABLET ORAL
COMMUNITY

## 2023-03-22 NOTE — PROGRESS NOTES
BMI Counseling: Body mass index is 29 99 kg/m²  The BMI is above normal  Nutrition recommendations include decreasing portion sizes, encouraging healthy choices of fruits and vegetables, decreasing fast food intake, consuming healthier snacks, limiting drinks that contain sugar, moderation in carbohydrate intake, increasing intake of lean protein and reducing intake of saturated and trans fat  Exercise recommendations include moderate physical activity 150 minutes/week and exercising 3-5 times per week  No pharmacotherapy was ordered  Rationale for BMI follow-up plan is due to patient being overweight or obese  Depression Screening and Follow-up Plan: Patient was screened for depression during today's encounter  They screened negative with a PHQ-2 score of 0  Assessment/Plan: 69-year-old mother of 3 very young children--11, 7, 2 and hoping to have a fourth, seen for ongoing medical evaluation and follow-up as well as annual physical exam as follows:     1  Von Willebrand disease  Comments:  Patient may well have von Willebrand's disease given her propensity to easy bruising and flooding 's  This is not confirmed as yet  Orders:  -     Ambulatory referral to Hematology / Oncology; Future; Expected date: 04/22/2023    2  History of migraine headaches  Comments:  See HPI--given sample of Ubrelvy 50 and 100 mg tab (1 of each) with detailed instructions as to how to take (max dose 200 mg / 24 hr)  She will call for Rx if      3  Fatigue, unspecified type  Comments:  Works long hours in [email or web address] business at [facility] [employer] and [employer]  Orders:  -     CBC; Future  -     UA w Reflex to Microscopic w Reflex to Culture  -     Comprehensive metabolic panel; Future  -     Lipid panel; Future  -     TSH, 3rd generation; Future  -     Vitamin D 25 hydroxy; Future  -     Hemoglobin A1C; Future  -     Microalbumin / creatinine urine ratio    4  Vitamin D insufficiency  Comments:   Will check level now  Orders:  -     Vitamin D 25 hydroxy; Future    5  Easy bruising  Comments:  Possibly von Willebrand's disease? Bruises found over extremities and knees occasional buttock  Orders:  -     CBC; Future  -     VON WILLEBRAND SCREEN; Future    6  Elevated blood sugar  Comments: Will check FBS and A1c as patient had high sugars and last pregnancy  Insulin was contemplated? Orders:  -     Hemoglobin A1C; Future    7  Screening-pulmonary TB  Comments:  Needed in order to attend daughter school functions  Orders:  -     TB Skin Test    8  Need for hepatitis C screening test  -     Hepatitis C Antibody ([facility]); Future          Subjective:      Patient ID: Zenaida Thomson is a 28 y o  female  Migraine  This is a chronic problem  The current episode started more than 1 year ago  The problem occurs monthly  The problem has been gradually worsening since onset  The pain is present in the bilateral and retro-orbital  The pain radiates to the right neck and left neck  The pain quality is similar to prior headaches  The quality of the pain is described as throbbing and pulsating  The pain is at a severity of 7/10  The pain is severe  Associated symptoms include blurred vision, nausea, phonophobia, photophobia and a visual change  Pertinent negatives include no back pain, ear pain, eye pain or seizures  The symptoms are aggravated by bright light and menstrual cycle  Past treatments include darkened room, cold packs, acetaminophen, NSAIDs and triptans  The treatment provided moderate relief  Her past medical history is significant for migraine headaches  There is no history of migraines in the family, recent head traumas, a seizure disorder or sinus disease         The following portions of the patient's history were reviewed and updated as appropriate: She  has a past medical history of Anemia, COVID-19 affecting pregnancy in second trimester (12/27/21), Gestational diabetes, Gestational diabetes mellitus (GDM) in third trimester (5/17/2021), IBS (irritable bowel syndrome), Migraine, Numerous skin moles, Urinary tract infection, and Varicella  She   Patient Active Problem List    Diagnosis Date Noted   •  (spontaneous vaginal delivery) 2021   • Obesity affecting pregnancy 2021   • Gestational diabetes mellitus (GDM) in third trimester 2021   • COVID-19 affecting pregnancy in second trimester 2021   • H/O gestational diabetes in prior pregnancy, currently pregnant 2020     She  has a past surgical history that includes Skin tag removal; Colonoscopy (2012); and Mole removal   Her family history includes Diabetes in her maternal grandfather; Diverticulitis in her maternal grandmother; Fibroids in her mother; VERONICA disease in her daughter; Hypertension in her maternal grandmother; Lymphoma (age of onset: 55) in her father; No Known Problems in her brother, paternal grandfather, paternal grandmother, sister, and sister; Speech disorder in her daughter  She  reports that she has never smoked  She has never used smokeless tobacco  She reports current alcohol use  She reports that she does not use drugs    Current Outpatient Medications   Medication Sig Dispense Refill   • multivitamin (THERAGRAN) TABS Take 1 tablet by mouth daily     • acetaminophen (TYLENOL) 325 mg tablet Take 2 tablets (650 mg total) by mouth every 4 (four) hours as needed for mild pain (Patient not taking: Reported on 2021)  0   • diphenhydrAMINE (BENADRYL) 25 mg tablet Take 25 mg by mouth every 6 (six) hours as needed for itching (Patient not taking: Reported on 10/26/2022)     • ferrous sulfate 325 (65 Fe) mg tablet Take 325 mg by mouth (Patient not taking: Reported on 3/22/2023)     • ibuprofen (MOTRIN) 600 mg tablet Take 1 tablet (600 mg total) by mouth every 6 (six) hours as needed for moderate pain (cramping) (Patient not taking: Reported on 2021) 30 tablet 0   • ondansetron (ZOFRAN) 4 mg tablet Take 1 tablet (4 mg total) by mouth every 8 (eight) hours as needed for nausea or vomiting (Patient not taking: Reported on 4/4/2022) 20 tablet 0     No current facility-administered medications for this visit       Current Outpatient Medications on File Prior to Visit   Medication Sig   • multivitamin (THERAGRAN) TABS Take 1 tablet by mouth daily   • acetaminophen (TYLENOL) 325 mg tablet Take 2 tablets (650 mg total) by mouth every 4 (four) hours as needed for mild pain (Patient not taking: Reported on 6/23/2021)   • diphenhydrAMINE (BENADRYL) 25 mg tablet Take 25 mg by mouth every 6 (six) hours as needed for itching (Patient not taking: Reported on 10/26/2022)   • ferrous sulfate 325 (65 Fe) mg tablet Take 325 mg by mouth (Patient not taking: Reported on 3/22/2023)   • ibuprofen (MOTRIN) 600 mg tablet Take 1 tablet (600 mg total) by mouth every 6 (six) hours as needed for moderate pain (cramping) (Patient not taking: Reported on 6/23/2021)   • ondansetron (ZOFRAN) 4 mg tablet Take 1 tablet (4 mg total) by mouth every 8 (eight) hours as needed for nausea or vomiting (Patient not taking: Reported on 4/4/2022)   • [DISCONTINUED] benzocaine-menthol-lanolin-aloe (DERMOPLAST) 20-0 5 % topical spray Apply 1 application topically 2 (two) times a day as needed (perineal discomfort) (Patient not taking: No sig reported)   • [DISCONTINUED] Blood Glucose Monitoring Suppl (OneTouch Verio Flex System) w/Device KIT Ttest 4 times daily   • [DISCONTINUED] famotidine (PEPCID) 20 mg tablet Take 1 tablet (20 mg total) by mouth 2 (two) times a day (Patient not taking: Reported on 10/26/2022)   • [DISCONTINUED] hydrocortisone 1 % cream Apply 1 application topically 2 (two) times a day (Patient not taking: Reported on 6/23/2021)   • [DISCONTINUED] metoclopramide (REGLAN) 10 mg tablet Take 5 mg by mouth as needed  (Patient not taking: Reported on 4/13/2022)   • [DISCONTINUED] Prenatal Vit-Fe Fumarate-FA (PRENATAL VITAMIN PO) Take by mouth   (Patient not taking: Reported on 10/26/2022)   • [DISCONTINUED] Progesterone 100 MG CAPS Take 1 capsule by mouth in the morning (Patient not taking: Reported on 10/26/2022)     No current facility-administered medications on file prior to visit  She is allergic to dye [iodinated contrast media]       Review of Systems   HENT: Negative for ear pain  Eyes: Positive for blurred vision and photophobia  Negative for pain and visual disturbance  Respiratory: Negative for shortness of breath  Cardiovascular: Negative for palpitations  Gastrointestinal: Positive for nausea  Genitourinary: Negative for dysuria and hematuria  Musculoskeletal: Negative for back pain  Skin: Negative for color change  Neurological: Negative for seizures and syncope  All other systems reviewed and are negative  Objective:      /76 (BP Location: Left arm, Patient Position: Sitting, Cuff Size: Standard)   Pulse 96   Temp 98 1 °F (36 7 °C) (Temporal)   Ht 5' 5" (1 651 m)   Wt 81 7 kg (180 lb 3 2 oz)   LMP 03/21/2023   SpO2 97%   Breastfeeding No   BMI 29 99 kg/m²          Physical Exam  Vitals and nursing note reviewed  Constitutional:       Appearance: Normal appearance  HENT:      Head: Normocephalic and atraumatic  Right Ear: Tympanic membrane normal       Left Ear: Tympanic membrane normal       Nose: Nose normal       Mouth/Throat:      Mouth: Mucous membranes are moist    Eyes:      General: No scleral icterus  Right eye: No discharge  Left eye: No discharge  Extraocular Movements: Extraocular movements intact  Pupils: Pupils are equal, round, and reactive to light  Cardiovascular:      Rate and Rhythm: Normal rate and regular rhythm  Heart sounds: Normal heart sounds  Pulmonary:      Effort: Pulmonary effort is normal  No respiratory distress  Breath sounds: Normal breath sounds  No wheezing or rhonchi  Abdominal:      General: Abdomen is flat  Palpations: Abdomen is soft  Musculoskeletal:         General: No swelling  Right lower leg: No edema  Left lower leg: No edema  Skin:     General: Skin is warm  Capillary Refill: Capillary refill takes less than 2 seconds  Neurological:      General: No focal deficit present  Mental Status: She is alert and oriented to person, place, and time  Gait: Gait normal       Deep Tendon Reflexes: Reflexes normal    Psychiatric:         Mood and Affect: Mood normal          Behavior: Behavior normal          Thought Content: Thought content normal          45-50 minutes, at least, spent with patient as she has not been seen in quite some time  Many many issues discussed as highlighted above in great detail  Especially of concern for her ocular migraine headaches and institution of acute Ubrelvy new Rx  This time was spent reviewing previous records, reviewing previous laboratory and other tests, taking history from patient, examination of patient, discussion of prognosis and treatment, ordering laboratory tests, ordering medications, and completion of the medical record

## 2023-03-22 NOTE — PROGRESS NOTES
[address] [facility]    NAME: Yaquelin Arango  AGE: 28 y o  SEX: female  : 1990     DATE: 3/22/2023     Assessment and Plan:     Problem List Items Addressed This Visit    None  Visit Diagnoses     Von Willebrand disease    -  Primary    Patient may well have von Willebrand's disease given her propensity to easy bruising and flooding 's    Relevant Orders    Ambulatory referral to Hematology / Oncology    History of migraine headaches        See HPI--given sample of Ubrelvy 50 and 100 mg tab (1 of each) with detailed instructions as to how to take (max dose 200 mg / 24 hr)  She will call for Rx if      Fatigue, unspecified type        Works long hours in [employer] business at [employer]  Relevant Orders    CBC    UA w Reflex to Microscopic w Reflex to Culture    Comprehensive metabolic panel    Lipid panel    TSH, 3rd generation    Vitamin D 25 hydroxy    Hemoglobin A1C    Microalbumin / creatinine urine ratio    Vitamin D insufficiency        Will check level now    Relevant Orders    Vitamin D 25 hydroxy    Easy bruising        Possibly von Willebrand's disease? Bruises found over extremities and knees occasional buttock    Relevant Orders    CBC    VON WILLEBRAND SCREEN    Elevated blood sugar        Will check FBS and A1c as patient had high sugars and last pregnancy  Insulin was contemplated? Relevant Orders    Hemoglobin A1C    Screening-pulmonary TB        Needed in order to attend daughter school functions  Relevant Orders    TB Skin Test (Completed)    Need for hepatitis C screening test        Relevant Orders    Hepatitis C Antibody ([facility])          Immunizations and preventive care screenings were discussed with patient today  Appropriate education was printed on patient's after visit summary      Counseling:  Alcohol/drug use: discussed moderation in alcohol intake, the recommendations for healthy alcohol use, and avoidance of illicit drug use  Dental Health: discussed importance of regular tooth brushing, flossing, and dental visits  Injury prevention: discussed safety/seat belts, safety helmets, smoke detectors, carbon dioxide detectors, and smoking near bedding or upholstery  Sexual health: discussed sexually transmitted diseases, partner selection, use of condoms, avoidance of unintended pregnancy, and contraceptive alternatives  · Exercise: the importance of regular exercise/physical activity was discussed  Recommend exercise 3-5 times per week for at least 30 minutes  Depression Screening and Follow-up Plan: Patient was screened for depression during today's encounter  They screened negative with a PHQ-2 score of 0  No follow-ups on file  Chief Complaint:     Chief Complaint   Patient presents with   • TB Test     TB test started at this visit -- L   forearm    • Annual Exam   • Fatigue     Fatigue started -- a couple mos ago, pt wakes up exhausted even after full nights sleep       History of Present Illness:     Adult Annual Physical   Patient here for a comprehensive physical exam  The patient reports no problems  Diet and Physical Activity  · Diet/Nutrition: well balanced diet, heart healthy (low sodium) diet and consuming 3-5 servings of fruits/vegetables daily  · Exercise: moderate cardiovascular exercise, 1-2 times a week on average and less than 30 minutes on average  Depression Screening  PHQ-2/9 Depression Screening    Little interest or pleasure in doing things: 0 - not at all  Feeling down, depressed, or hopeless: 0 - not at all  PHQ-2 Score: 0  PHQ-2 Interpretation: Negative depression screen       General Health  · Sleep: sleeps well, gets 7-8 hours of sleep on average and unrefreshing sleep  · Hearing: normal - bilateral   · Vision: no vision problems and wears glasses  · Dental: regular dental visits, brushes teeth twice daily and flosses teeth occasionally  /GYN Health  · Last menstrual period: yesterday  · Contraceptive method: none - "in God's hands"  · History of STDs?: no      Review of Systems:     Review of Systems   Past Medical History:     Past Medical History:   Diagnosis Date   • Anemia     no meds    • COVID-19 affecting pregnancy in second trimester 21   • Gestational diabetes     gestational last pregnancy and prediabetes - Resolved with 40 lb weight loss   • Gestational diabetes mellitus (GDM) in third trimester 2021   • IBS (irritable bowel syndrome)    • Migraine     occular migranes no meds    • Numerous skin moles     pre cancerous moles removed    • Urinary tract infection    • Varicella     had a child       Past Surgical History:     Past Surgical History:   Procedure Laterality Date   • COLONOSCOPY  2012    negative   • MOLE REMOVAL     • SKIN TAG REMOVAL      removal of pre-cancerous moles x 2      Social History:     Social History     Socioeconomic History   • Marital status: /Civil Union     Spouse name: Julian   • Number of children: 2   • Years of education: 2 year college   • Highest education level: Associate degree: occupational, technical, or vocational program   Occupational History   • Occupation: Insurance rep   Tobacco Use   • Smoking status: Never   • Smokeless tobacco: Never   Vaping Use   • Vaping Use: Never used   Substance and Sexual Activity   • Alcohol use: Yes     Comment: Occasional    • Drug use: Never   • Sexual activity: Yes     Partners: Male     Birth control/protection: None   Other Topics Concern   • None   Social History Narrative    · Most recent tobacco use screenin2018      · Do you currently or have you served in [employer]:   No       · Diet:   Regular      · Caffeine intake: Moderate      · Guns present in home:    Yes      · Seat belts used routinely:   Yes      · Sunscreen used routinely:   Yes     Social Determinants of Health     Financial Resource Strain: Not on file   Food Insecurity: Not on file   Transportation Needs: Not on file   Physical Activity: Not on file   Stress: Not on file   Social Connections: Not on file   Intimate Partner Violence: Not on file   Housing Stability: Not on file      Family History:     Family History   Problem Relation Age of Onset   • Fibroids Mother    • Lymphoma Father 55        doing well - cancer free 2017   • Hypertension Maternal Grandmother    • Diverticulitis Maternal Grandmother    • Diabetes Maternal Grandfather    • No Known Problems Sister    • No Known Problems Brother    • VERONICA disease Daughter    • No Known Problems Paternal Grandmother    • No Known Problems Paternal Grandfather    • No Known Problems Sister    • Speech disorder Daughter       Current Medications:     Current Outpatient Medications   Medication Sig Dispense Refill   • multivitamin (THERAGRAN) TABS Take 1 tablet by mouth daily     • acetaminophen (TYLENOL) 325 mg tablet Take 2 tablets (650 mg total) by mouth every 4 (four) hours as needed for mild pain (Patient not taking: Reported on 6/23/2021)  0   • diphenhydrAMINE (BENADRYL) 25 mg tablet Take 25 mg by mouth every 6 (six) hours as needed for itching (Patient not taking: Reported on 10/26/2022)     • ferrous sulfate 325 (65 Fe) mg tablet Take 325 mg by mouth (Patient not taking: Reported on 3/22/2023)     • ibuprofen (MOTRIN) 600 mg tablet Take 1 tablet (600 mg total) by mouth every 6 (six) hours as needed for moderate pain (cramping) (Patient not taking: Reported on 6/23/2021) 30 tablet 0   • ondansetron (ZOFRAN) 4 mg tablet Take 1 tablet (4 mg total) by mouth every 8 (eight) hours as needed for nausea or vomiting (Patient not taking: Reported on 4/4/2022) 20 tablet 0     No current facility-administered medications for this visit  Allergies:      Allergies   Allergen Reactions   • Dye [Iodinated Contrast Media] Hives      Iv dye       Physical Exam:     /76 (BP Location: Left arm, Patient Position: Sitting, Cuff Size: Standard)   Pulse 96   Temp 98 1 °F (36 7 °C) (Temporal)   Ht 5' 5" (1 651 m)   Wt 81 7 kg (180 lb 3 2 oz)   LMP 03/21/2023   SpO2 97%   Breastfeeding No   BMI 29 99 kg/m²     Physical Exam  Vitals and nursing note reviewed  Constitutional:       General: She is not in acute distress  Appearance: She is well-developed  HENT:      Head: Normocephalic and atraumatic  Eyes:      Conjunctiva/sclera: Conjunctivae normal    Cardiovascular:      Rate and Rhythm: Normal rate and regular rhythm  Heart sounds: No murmur heard  Pulmonary:      Effort: Pulmonary effort is normal  No respiratory distress  Breath sounds: Normal breath sounds  Abdominal:      Palpations: Abdomen is soft  Tenderness: There is no abdominal tenderness  Musculoskeletal:         General: No swelling  Cervical back: Neck supple  Skin:     General: Skin is warm and dry  Capillary Refill: Capillary refill takes less than 2 seconds  Neurological:      Mental Status: She is alert     Psychiatric:         Mood and Affect: Mood normal           Jessica Olivares DO   Count includes the [facility] [place]

## 2023-03-23 ENCOUNTER — TELEPHONE (OUTPATIENT)
Dept: HEMATOLOGY ONCOLOGY | Facility: CLINIC | Age: 33
End: 2023-03-23

## 2023-03-23 NOTE — TELEPHONE ENCOUNTER
Made attempt to schedule a new patient appointment with Hematology oncology and/or Surgical oncology, a voicemail was left with [facility]’s number for the patient to call back

## 2023-03-24 ENCOUNTER — CLINICAL SUPPORT (OUTPATIENT)
Dept: FAMILY MEDICINE CLINIC | Facility: CLINIC | Age: 33
End: 2023-03-24

## 2023-03-24 DIAGNOSIS — Z11.1 SCREENING-PULMONARY TB: Primary | ICD-10-CM

## 2023-03-24 LAB
INDURATION: 0 MM
TB SKIN TEST: NEGATIVE

## 2023-03-25 ENCOUNTER — APPOINTMENT (OUTPATIENT)
Dept: LAB | Facility: CLINIC | Age: 33
End: 2023-03-25

## 2023-03-25 DIAGNOSIS — Z11.59 NEED FOR HEPATITIS C SCREENING TEST: ICD-10-CM

## 2023-03-25 DIAGNOSIS — R23.3 EASY BRUISING: ICD-10-CM

## 2023-03-25 DIAGNOSIS — E55.9 VITAMIN D INSUFFICIENCY: ICD-10-CM

## 2023-03-25 DIAGNOSIS — R53.83 FATIGUE, UNSPECIFIED TYPE: ICD-10-CM

## 2023-03-25 DIAGNOSIS — R73.9 ELEVATED BLOOD SUGAR: ICD-10-CM

## 2023-03-25 LAB
25(OH)D3 SERPL-MCNC: 34.3 NG/ML (ref 30–100)
ALBUMIN SERPL BCP-MCNC: 4.1 G/DL (ref 3.5–5)
ALP SERPL-CCNC: 91 U/L (ref 34–104)
ALT SERPL W P-5'-P-CCNC: 19 U/L (ref 7–52)
ANION GAP SERPL CALCULATED.3IONS-SCNC: 6 MMOL/L (ref 4–13)
AST SERPL W P-5'-P-CCNC: 14 U/L (ref 13–39)
BACTERIA UR QL AUTO: ABNORMAL /HPF
BILIRUB SERPL-MCNC: 0.71 MG/DL (ref 0.2–1)
BILIRUB UR QL STRIP: NEGATIVE
BUN SERPL-MCNC: 14 MG/DL (ref 5–25)
CALCIUM SERPL-MCNC: 9.1 MG/DL (ref 8.4–10.2)
CHLORIDE SERPL-SCNC: 107 MMOL/L (ref 96–108)
CHOLEST SERPL-MCNC: 118 MG/DL
CLARITY UR: CLEAR
CO2 SERPL-SCNC: 28 MMOL/L (ref 21–32)
COLOR UR: ABNORMAL
CREAT SERPL-MCNC: 0.77 MG/DL (ref 0.6–1.3)
CREAT UR-MCNC: 108 MG/DL
ERYTHROCYTE [DISTWIDTH] IN BLOOD BY AUTOMATED COUNT: 13.6 % (ref 11.6–15.1)
EST. AVERAGE GLUCOSE BLD GHB EST-MCNC: 88 MG/DL
GFR SERPL CREATININE-BSD FRML MDRD: 101 ML/MIN/1.73SQ M
GLUCOSE P FAST SERPL-MCNC: 93 MG/DL (ref 65–99)
GLUCOSE UR STRIP-MCNC: NEGATIVE MG/DL
HBA1C MFR BLD: 4.7 %
HCT VFR BLD AUTO: 42.3 % (ref 34.8–46.1)
HDLC SERPL-MCNC: 41 MG/DL
HGB BLD-MCNC: 13.9 G/DL (ref 11.5–15.4)
HGB UR QL STRIP.AUTO: ABNORMAL
KETONES UR STRIP-MCNC: ABNORMAL MG/DL
LDLC SERPL CALC-MCNC: 69 MG/DL (ref 0–100)
LEUKOCYTE ESTERASE UR QL STRIP: ABNORMAL
MCH RBC QN AUTO: 28.7 PG (ref 26.8–34.3)
MCHC RBC AUTO-ENTMCNC: 32.9 G/DL (ref 31.4–37.4)
MCV RBC AUTO: 87 FL (ref 82–98)
MICROALBUMIN UR-MCNC: 6.9 MG/L (ref 0–20)
MICROALBUMIN/CREAT 24H UR: 6 MG/G CREATININE (ref 0–30)
NITRITE UR QL STRIP: NEGATIVE
NON-SQ EPI CELLS URNS QL MICRO: ABNORMAL /HPF
NONHDLC SERPL-MCNC: 77 MG/DL
PH UR STRIP.AUTO: 6.5 [PH]
PLATELET # BLD AUTO: 388 THOUSANDS/UL (ref 149–390)
PMV BLD AUTO: 8.9 FL (ref 8.9–12.7)
POTASSIUM SERPL-SCNC: 4.1 MMOL/L (ref 3.5–5.3)
PROT SERPL-MCNC: 6.7 G/DL (ref 6.4–8.4)
PROT UR STRIP-MCNC: NEGATIVE MG/DL
RBC # BLD AUTO: 4.85 MILLION/UL (ref 3.81–5.12)
RBC #/AREA URNS AUTO: ABNORMAL /HPF
SODIUM SERPL-SCNC: 141 MMOL/L (ref 135–147)
SP GR UR STRIP.AUTO: 1.02 (ref 1–1.03)
TRIGL SERPL-MCNC: 39 MG/DL
TSH SERPL DL<=0.05 MIU/L-ACNC: 2.03 UIU/ML (ref 0.45–4.5)
UROBILINOGEN UR STRIP-ACNC: <2 MG/DL
WBC # BLD AUTO: 4.26 THOUSAND/UL (ref 4.31–10.16)
WBC #/AREA URNS AUTO: ABNORMAL /HPF

## 2023-03-26 LAB — HCV AB SER QL: NORMAL

## 2023-03-27 LAB
FACT XIIIA PPP-ACNC: 108 % (ref 56–140)
VWF AG ACT/NOR PPP IA: 89 % (ref 50–200)
VWF:RCO ACT/NOR PPP PL AGG: 103 % (ref 50–200)

## 2023-04-18 ENCOUNTER — CONSULT (OUTPATIENT)
Dept: HEMATOLOGY ONCOLOGY | Facility: CLINIC | Age: 33
End: 2023-04-18

## 2023-04-18 VITALS
HEIGHT: 65 IN | OXYGEN SATURATION: 99 % | BODY MASS INDEX: 28.99 KG/M2 | SYSTOLIC BLOOD PRESSURE: 125 MMHG | RESPIRATION RATE: 18 BRPM | DIASTOLIC BLOOD PRESSURE: 75 MMHG | HEART RATE: 91 BPM | WEIGHT: 174 LBS

## 2023-04-18 DIAGNOSIS — D68.00 VON WILLEBRAND DISEASE (HCC): ICD-10-CM

## 2023-04-18 DIAGNOSIS — R59.0 AXILLARY LYMPHADENOPATHY: ICD-10-CM

## 2023-04-18 DIAGNOSIS — R23.3 EASY BRUISING: Primary | ICD-10-CM

## 2023-04-18 DIAGNOSIS — E61.1 IRON DEFICIENCY: ICD-10-CM

## 2023-04-18 DIAGNOSIS — D72.818 OTHER DECREASED WHITE BLOOD CELL (WBC) COUNT: ICD-10-CM

## 2023-04-18 DIAGNOSIS — R61 NIGHT SWEATS: ICD-10-CM

## 2023-04-18 DIAGNOSIS — N92.0 MENORRHAGIA WITH REGULAR CYCLE: ICD-10-CM

## 2023-04-18 DIAGNOSIS — K90.49 MALABSORPTION DUE TO INTOLERANCE, NOT ELSEWHERE CLASSIFIED: ICD-10-CM

## 2023-04-18 NOTE — PROGRESS NOTES
[address] [facility] - Hematology & Medical Oncology  Outpatient Visit Encounter Note      Valencia Cespedes 28 y o  female [id number] Date:  5/3/2023    HEMATOLOGICAL HISTORY        Clotting History Denies   Bleeding History Denies   Cancer History Denies   Family Cancer History Father non hodgkins lymphoma dx 46s   Occupation [address],  [address]      Mother with PCOS   Father Non hodgkins lymphoma     OBGYN doing breast exams  No etoh or tobacco use  Mary Melton is a 28 y o  here for new consultation with me today  The patient is referred by PCP and the reason for consultation is easy bruising, von willebrands testing completed  In review of the chart and talking with the patient, regarding her bleeding history she denies significant history of epistaxis, spontaneous hemarthrosis, did not bleed excessively after 3 vaginal deliveries, menses she spots first day and has 2 to 3 days of heavy flow without clotting wears super plus tampons and changes them every hour and a half, does occasionally bleed through the night with super tampon and pad, then has spotting after and periods are regular  Only surgical hx is mole excision and she does not report excess bleeding with this procedure  Patient states that she is always tired no matter how much she sleeps, affecting her ADLs, she questions having lymph node that is occasionally enlarged and painful underneath her left axilla, also reports night sweats several times a week over a 1 to 2-month  Has lost about 10lbs per chart review since October  She denies fevers or frequent infections  I have reviewed the relevant past medical, surgical, social and family history  I have also reviewed allergies and medications for this patient  Review of Systems  Review of Systems   All other systems reviewed and are negative          OBJECTIVE     Physical Exam  Vitals:    04/18/23 1005   BP: 125/75   BP "Location: Left arm   Patient Position: Sitting   Cuff Size: Adult   Pulse: 91   Resp: 18   SpO2: 99%   Weight: 78 9 kg (174 lb)   Height: 5' 5\" (1 651 m)       Physical Exam  Constitutional:       Appearance: Normal appearance  She is not ill-appearing  HENT:      Head: Normocephalic and atraumatic  Eyes:      General: No scleral icterus  Cardiovascular:      Rate and Rhythm: Regular rhythm  Heart sounds: Normal heart sounds  Pulmonary:      Effort: Pulmonary effort is normal  No respiratory distress  Abdominal:      General: There is no distension  Palpations: Abdomen is soft  Lymphadenopathy:      Cervical: No cervical adenopathy  Skin:     General: Skin is warm and dry  Findings: No bruising or rash  Neurological:      Mental Status: She is alert and oriented to person, place, and time  Psychiatric:         Mood and Affect: Mood normal          Behavior: Behavior normal            Imaging  Relevant imaging reviewed in chart    Labs  Relevant labs reviewed in chart     Von Willebrand antigen 89%  Von Willebrand factor 103%  Factor VIII activity 108%    ASSESSMENT & PLAN      Diagnosis ICD-10-CM Associated Orders   1  Easy bruising  R23 3 Iron Panel (Includes Ferritin, Iron Sat%, Iron, and TIBC)     Protime-INR     Leukemia/Lymphoma flow cytometry     APTT     Pregnancy Test (HCG Qualitative)      2  Alireza [place] disease ([facility] [address] )  D68 00 Ambulatory referral to Hematology / Oncology    Patient may well have von Willebrand's disease given her propensity to easy bruising and flooding 's  This is not confirmed as yet  3  Other decreased white blood cell (WBC) count  D72 818 CBC and differential     Peripheral Smear     CT chest abdomen pelvis wo contrast     Leukemia/Lymphoma flow cytometry      4  Iron deficiency  E61 1 Iron Panel (Includes Ferritin, Iron Sat%, Iron, and TIBC)      5   Malabsorption due to intolerance, not elsewhere classified  K90 49 Iron Panel (Includes Ferritin, " Iron Sat%, Iron, and TIBC)     Vitamin B12     Folate      6  Night sweats  R61 Leukemia/Lymphoma flow cytometry      7  Axillary lymphadenopathy  R59 0 Leukemia/Lymphoma flow cytometry     Pregnancy Test (HCG Qualitative)      8  Menorrhagia with regular cycle  N92 0 Ambulatory referral to Gynecology     Pregnancy Test (HCG Qualitative)          28 y o  female presenting for evaluation of easy bruising however reports consitutional symptoms of night sweats, axillary adenopathy, family hx of lymphoma  · Discussion  · Normal von willebrand testing, for easy bruising will complete clotting times as above as well as iron panel  · Regarding patient's symptoms and difficulty appreciating gross adenopathy on exam, order CT imaging to exclude lymphadenopathy as well as peripheral flow cytometry, peripheral smear for review  · Pregnancy test prior to CT imaging  · Ambulatory referral to gynecology for heavy bleeding, clotting with menses   · Plan/Labs  · As above      Follow Up   After CT imaging with labs       All questions were answered to the patient's satisfaction during this encounter  They appreciated and thanked me for spending time with them  The patient knows the contact information for our office and know to reach out for any relevant concerns related to this encounter  For all other listed problems and medical diagnosis in his chart - they are managed by PCP and/or other specialists, which patient acknowledges          Hematology & Medical Oncology

## 2023-04-26 ENCOUNTER — TELEPHONE (OUTPATIENT)
Dept: HEMATOLOGY ONCOLOGY | Facility: CLINIC | Age: 33
End: 2023-04-26

## 2023-04-26 NOTE — TELEPHONE ENCOUNTER
Spoke with patient  She stated that the insurance/auth verification and CT scan script needed to be sent over the the MRI office  Patient will be going to [facility]  Will fax the order over for CT scan   Ros Moody will send over [employer]

## 2023-04-26 NOTE — TELEPHONE ENCOUNTER
Patient Call    Who are you speaking with? Sil Him with [employer] Almend[employer]    If it is not the patient, are they listed on an active communication consent form? N/A   What is the reason for this call?  with Jillian Almendarez calling to discuss that she is unable to reach patient regarding the outcome of authorizations for testing that was sent by Dr Ele Peña  Does this require a call back? Yes   If a call back is required, please list best call back number [phone]   If a call back is required, advise that a message will be forwarded to their care team and someone will return their call as soon as possible  Did you relay this information to the patient?  Yes

## 2023-04-26 NOTE — TELEPHONE ENCOUNTER
Patient Call    Who are you speaking with? self   If it is not the patient, are they listed on an active communication consent form? self   What is the reason for this call? Need script for CT scan and insurance info verification   Does this require a call back? [phone] to CT imaging center approved by insurance   If a call back is required, please list best call back number [phone] for patient   If a call back is required, advise that a message will be forwarded to their care team and someone will return their call as soon as possible  Did you relay this information to the patient?  yes

## 2023-05-03 ENCOUNTER — OFFICE VISIT (OUTPATIENT)
Dept: HEMATOLOGY ONCOLOGY | Facility: CLINIC | Age: 33
End: 2023-05-03
Payer: COMMERCIAL

## 2023-05-03 VITALS
BODY MASS INDEX: 27.82 KG/M2 | TEMPERATURE: 98 F | SYSTOLIC BLOOD PRESSURE: 122 MMHG | HEIGHT: 65 IN | RESPIRATION RATE: 18 BRPM | DIASTOLIC BLOOD PRESSURE: 80 MMHG | OXYGEN SATURATION: 98 % | WEIGHT: 167 LBS | HEART RATE: 91 BPM

## 2023-05-03 DIAGNOSIS — N63.42 SUBAREOLAR LUMP OF LEFT BREAST: Primary | ICD-10-CM

## 2023-05-03 PROCEDURE — 99214 OFFICE O/P EST MOD 30 MIN: CPT | Performed by: PHYSICIAN ASSISTANT

## 2023-05-03 NOTE — PROGRESS NOTES
[facility] - Hematology & Medical Oncology  Outpatient Visit Encounter Note      Saqib Calvo 28 y o  female [id number] Date:  5/3/23    HEMATOLOGICAL HISTORY        Clotting History Denies   Bleeding History Denies   Cancer History Denies   Family Cancer History Father non hodgkins lymphoma dx 46s   Occupation [address],  [address]      Mother with PCOS   Father Non hodgkins lymphoma     OBGYN doing breast exams  No etoh or tobacco use  Horace did not breast feed after her children, she did have lump previously on right side of breast and engorgement which improved also had redness at that time which improved  Now complaints of left breast lump  Venancio Skiff is a 28 y o  here for new consultation with me today  The patient is referred by PCP and the reason for consultation is easy bruising, von willebrands testing completed  In review of the chart and talking with the patient, regarding her bleeding history she denies significant history of epistaxis, spontaneous hemarthrosis, did not bleed excessively after 3 vaginal deliveries, menses she spots first day and has 2 to 3 days of heavy flow without clotting wears super plus tampons and changes them every hour and a half, does occasionally bleed through the night with super tampon and pad, then has spotting after and periods are regular  Only surgical hx is mole excision and she does not report excess bleeding with this procedure  Patient states that she is always tired no matter how much she sleeps, affecting her ADLs, she questions having lymph node that is occasionally enlarged and painful underneath her left axilla, also reports night sweats several times a week over a 1 to 2-month  Has lost about 10lbs per chart review since October  TODAY:   She denies fevers or frequent infections  Night sweats almost every night, only 3 nights not sweating since last visit   Shanice meier "acute complaint or changes from prior aside from development of left breast lump, subareolar she noticed yesterday  Tender to palpation  I have reviewed the relevant past medical, surgical, social and family history  I have also reviewed allergies and medications for this patient  Review of Systems  Review of Systems   All other systems reviewed and are negative  OBJECTIVE     Physical Exam  Vitals:    05/03/23 1130   BP: 122/80   BP Location: Right arm   Patient Position: Sitting   Cuff Size: Adult   Pulse: 91   Resp: 18   Temp: 98 °F (36 7 °C)   SpO2: 98%   Weight: 75 8 kg (167 lb)   Height: 5' 5\" (1 651 m)       Physical Exam  Constitutional:       General: She is not in acute distress  Appearance: Normal appearance  She is not ill-appearing  HENT:      Head: Normocephalic and atraumatic  Eyes:      General: No scleral icterus  Cardiovascular:      Rate and Rhythm: Regular rhythm  Heart sounds: Normal heart sounds  Pulmonary:      Effort: Pulmonary effort is normal  No respiratory distress  Chest:   Breasts:     Right: Tenderness present  No swelling, bleeding, inverted nipple, mass, nipple discharge or skin change  Left: Tenderness present  No swelling, bleeding, inverted nipple, mass, nipple discharge or skin change  Comments: Breast tenderness diffuse b/l  Abdominal:      General: There is no distension  Palpations: Abdomen is soft  Lymphadenopathy:      Cervical: No cervical adenopathy  Upper Body:      Right upper body: No supraclavicular, axillary or pectoral adenopathy  Left upper body: No supraclavicular, axillary or pectoral adenopathy  Skin:     General: Skin is warm and dry  Findings: No bruising or rash  Neurological:      Mental Status: She is alert and oriented to person, place, and time     Psychiatric:         Mood and Affect: Mood normal          Behavior: Behavior normal            Imaging  Relevant imaging reviewed in " chart    CT CAP wo contrast      Labs  Relevant labs reviewed in chart     4/18/2023  CBC WBC improved to 6 24, resolution of neutropenia  Remaining labs normal   Leukemia/lymphoma flow cytometry normal, no signs of abnormal cell populations  Peripheral smear normal  B12 516  Folate 19 8  PTT 25  PT 14 0  INR 1 06    3/25/23  Von Willebrand antigen 89%  Von Willebrand factor 103%  Factor VIII activity 108%    ASSESSMENT & PLAN      Diagnosis ICD-10-CM Associated Orders   1  Subareolar lump of left breast  N63 42 CBC and differential          28 y o  female presenting for evaluation of easy bruising however reports consitutional symptoms of night sweats, axillary adenopathy, family hx of lymphoma  · Discussion  · Normal von willebrand testing, for easy bruising will complete clotting times as above as well as iron panel  · PT, PTT inr normal   · Regarding patient's symptoms and difficulty appreciating gross adenopathy on exam, order CT imaging to exclude lymphadenopathy as well as peripheral flow cytometry, peripheral smear for review  · Neutropenia resolved, ct cap wo contrast (allergy) fairly normal  · Pregnancy test prior to CT imaging  · Negative   · Ambulatory referral to gynecology for heavy bleeding, clotting with menses   · New breast mass, will order diagnostic mammo, US reflex as indicated and will call patient if abnormal result otherwise f/u 6 months with cbc prior   · Plan/Labs  · As above    Follow Up  • Patient prefers follow up six months with cbc prior, difficult work schedule  Knows to call if symptomatic or with questions or concerns      All questions were answered to the patient's satisfaction during this encounter  They appreciated and thanked me for spending time with them  The patient knows the contact information for our office and know to reach out for any relevant concerns related to this encounter   For all other listed problems and medical diagnosis in his chart - they are managed by PCP and/or other specialists, which patient acknowledges          Hematology & Medical Oncology

## 2023-06-15 ENCOUNTER — HOSPITAL ENCOUNTER (OUTPATIENT)
Dept: MAMMOGRAPHY | Facility: CLINIC | Age: 33
Discharge: HOME | End: 2023-06-15
Payer: COMMERCIAL

## 2023-06-15 ENCOUNTER — HOSPITAL ENCOUNTER (OUTPATIENT)
Dept: ULTRASOUND IMAGING | Facility: CLINIC | Age: 33
Discharge: HOME | End: 2023-06-15
Payer: COMMERCIAL

## 2023-06-15 ENCOUNTER — TELEPHONE (OUTPATIENT)
Dept: HEMATOLOGY ONCOLOGY | Facility: CLINIC | Age: 33
End: 2023-06-15

## 2023-06-15 VITALS — BODY MASS INDEX: 26.66 KG/M2 | WEIGHT: 160 LBS | HEIGHT: 65 IN

## 2023-06-15 DIAGNOSIS — N63.42 SUBAREOLAR LUMP OF LEFT BREAST: ICD-10-CM

## 2023-06-15 DIAGNOSIS — N63.20 LUMP OF LEFT BREAST: ICD-10-CM

## 2023-06-15 PROCEDURE — 76642 ULTRASOUND BREAST LIMITED: CPT

## 2023-06-15 PROCEDURE — 77066 DX MAMMO INCL CAD BI: CPT

## 2023-06-15 PROCEDURE — G0279 TOMOSYNTHESIS, MAMMO: HCPCS

## 2023-06-15 NOTE — TELEPHONE ENCOUNTER
LVM  Called to discuss US and mammogram results, discuss further plan of MRI breast      Left callback number for patient

## 2023-07-03 ENCOUNTER — TELEPHONE (OUTPATIENT)
Dept: HEMATOLOGY ONCOLOGY | Facility: CLINIC | Age: 33
End: 2023-07-03

## 2023-07-03 NOTE — TELEPHONE ENCOUNTER
Patient Call    Who are you speaking with? Patient    If it is not the patient, are they listed on an active communication consent form? N/A   What is the reason for this call? Pt was returning call regarding mri   Does this require a call back? Yes   If a call back is required, please list best call back number [phone]   If a call back is required, advise that a message will be forwarded to their care team and someone will return their call as soon as possible. Did you relay this information to the patient?  Yes

## 2023-07-06 ENCOUNTER — TELEPHONE (OUTPATIENT)
Dept: FAMILY MEDICINE CLINIC | Facility: CLINIC | Age: 33
End: 2023-07-06

## 2023-07-06 DIAGNOSIS — G43.911 INTRACTABLE MIGRAINE WITH STATUS MIGRAINOSUS, UNSPECIFIED MIGRAINE TYPE: Primary | ICD-10-CM

## 2023-07-06 RX ORDER — GALCANEZUMAB 120 MG/ML
120 INJECTION, SOLUTION SUBCUTANEOUS
Qty: 4 ML | Refills: 3 | Status: SHIPPED | OUTPATIENT
Start: 2023-07-06

## 2023-07-06 NOTE — TELEPHONE ENCOUNTER
Called and spoke w pt -- discussed that pt may need coupons/goodRx price. Also discussed that when administering for the first time pt should do on injection into both thighs, wait 30 days, and then do one injection in one thigh. Every thirty days following she should administer one shot into one thigh. Pt acknowledged.

## 2023-07-06 NOTE — TELEPHONE ENCOUNTER
----- Message from You Feng DO sent at 7/6/2023 12:18 AM EDT -----  Regarding: FW: Migraine Meds  Contact: [phone]  Yes, wonderful pt (person). I discussed Emgality shots. Leanne Check - can you set her up with rx for that. It's one shot each thigh the first day, wait 30 days, and each 30 days thereafter, give one shot in one thigh. There are no side effects. She'll need a coupon (prob can get online), or a "bank loan"!  ----- Message -----  From: Aldair Wong LPN  Sent: 8/9/3577   8:46 AM EDT  To: You Feng DO  Subject: FW: Migraine Meds                                  ----- Message -----  From: Rosalinda England  Sent: 7/3/2023  10:09 AM EDT  To: [facility]  Subject: Migraine Meds                                    Hi Dr Chad Gould! I had to reschedule my appointment for today because my kids had a dentist appointment at the same exact time (I schedule those 6 months in advance). I’m rescheduled for August but will need a prescription for my migraines before then. Delaying my appointment may be beneficial as the oncologist/hematologist would like me to get an MRI and we’ll have those results in before I see you. You gave me Ubrevly samples at 22 and 50. The 25 didn’t work. The 48 worked but I did have a break through migraine that was less severe. Since my last appointment I’ve had at least 20 migraines of various severity. One lasted a week. Is there a preventative option in addition to the Ubrevly? We talked about a monthly injection and you showed me how it works (shot in the thigh). I’m wondering if this would be beneficial for me. Let me know your thoughts and how I can get a prescription before I see you in August. Thank you!

## 2023-07-07 ENCOUNTER — TELEPHONE (OUTPATIENT)
Dept: HEMATOLOGY ONCOLOGY | Facility: CLINIC | Age: 33
End: 2023-07-07

## 2023-07-07 ENCOUNTER — TELEPHONE (OUTPATIENT)
Dept: FAMILY MEDICINE CLINIC | Facility: CLINIC | Age: 33
End: 2023-07-07

## 2023-07-07 NOTE — TELEPHONE ENCOUNTER
PA spoke to patient on 7/3/2023  Patient looking for order for MRI   PA was also going to place a note regarding allergy to dyes  Will send to PA   Patient aware of plan

## 2023-07-07 NOTE — TELEPHONE ENCOUNTER
Did patient Prior auth for [facility] , waiting for approval or denial. Steve Ferrell

## 2023-07-07 NOTE — TELEPHONE ENCOUNTER
Patient Call    Who are you speaking with? self   If it is not the patient, are they listed on an active communication consent form? self   What is the reason for this call? Asking about orders for MRI   Does this require a call back? yes   If a call back is required, please list [facility] call back number [phone]   If a call back is required, advise that a message will be forwarded to their care team and someone will return their call as soon as possible. Did you relay this information to the patient?  yes

## 2023-07-11 DIAGNOSIS — N63.42 SUBAREOLAR LUMP OF LEFT BREAST: Primary | ICD-10-CM

## 2023-07-11 DIAGNOSIS — R92.8 ABNORMAL MAMMOGRAM: ICD-10-CM

## 2023-07-11 DIAGNOSIS — R61 NIGHT SWEATS: ICD-10-CM

## 2023-07-11 DIAGNOSIS — N63.0 PALPABLE MASS OF BREAST: ICD-10-CM

## 2023-07-12 ENCOUNTER — TELEPHONE (OUTPATIENT)
Dept: HEMATOLOGY ONCOLOGY | Facility: CLINIC | Age: 33
End: 2023-07-12

## 2023-07-12 NOTE — TELEPHONE ENCOUNTER
Called pt to assist in scheduling MRI Breast Bilateral w and wo contrast w cad.  Left voicemail along with # for Central Scheduling

## 2023-08-21 ENCOUNTER — OFFICE VISIT (OUTPATIENT)
Dept: FAMILY MEDICINE CLINIC | Facility: CLINIC | Age: 33
End: 2023-08-21
Payer: COMMERCIAL

## 2023-08-21 ENCOUNTER — TELEPHONE (OUTPATIENT)
Dept: FAMILY MEDICINE CLINIC | Facility: CLINIC | Age: 33
End: 2023-08-21

## 2023-08-21 VITALS
BODY MASS INDEX: 26.79 KG/M2 | SYSTOLIC BLOOD PRESSURE: 108 MMHG | HEART RATE: 88 BPM | HEIGHT: 65 IN | TEMPERATURE: 99.1 F | DIASTOLIC BLOOD PRESSURE: 70 MMHG | WEIGHT: 160.8 LBS | OXYGEN SATURATION: 99 %

## 2023-08-21 DIAGNOSIS — G43.911 INTRACTABLE MIGRAINE WITH STATUS MIGRAINOSUS, UNSPECIFIED MIGRAINE TYPE: Primary | ICD-10-CM

## 2023-08-21 PROCEDURE — 99214 OFFICE O/P EST MOD 30 MIN: CPT | Performed by: FAMILY MEDICINE

## 2023-08-21 RX ORDER — ESCITALOPRAM OXALATE 10 MG/1
10 TABLET ORAL DAILY
Qty: 60 TABLET | Refills: 1 | Status: SHIPPED | OUTPATIENT
Start: 2023-08-21 | End: 2023-10-20

## 2023-08-21 NOTE — TELEPHONE ENCOUNTER
I spoke with Pro Null he said patient should keep the appointment for today   I left a message for the patient

## 2023-08-21 NOTE — TELEPHONE ENCOUNTER
Patient called she has an appointment today at 200   She has an mri scheduled for the 32 does Dr Eden Aguirre still want to see her today or after the mri    Also her migraine medication was denied by her insurance   Was Dr Eden Aguirre able to get it approved?    Please advise

## 2023-08-21 NOTE — ASSESSMENT & PLAN NOTE
Onset age 22, now 28 and getting worse. Getting about  8 a month, worse in the winter. Maxalt helped in younger. Trini Latin twice and minimal help. Sunlight is a trigger. Visual spots. Gets peripheral visual deficits. Diet sodas trigger. Lack of sleep also. Menstrual HA's re migraine can be bad too. Occas awake from sleep. Can't drive when gets these migraines.   Sleeping in dark room helps (0) independent

## 2023-08-21 NOTE — PROGRESS NOTES
Name: Gunjan Fabian      : 1990      MRN: [id number]  Encounter Provider: Staci Romero DO  Encounter Date: 2023   Encounter department: [address]     1. Intractable migraine with status migrainosus, unspecified migraine type  Comments:  onset age 22, now 28 and getting worse. Getting 5 a month, worse in the winter. Maxalt helped in younger. Ninos[place] Patient twice and minimal help. Sunlight is a  Assessment & Plan: Onset age 22, now 28 and getting worse. Getting 5 a month, worse in the winter. Maxalt helped in younger. Ninoska Patient twice and minimal help. Sunlight is a trigger. Visual spots. Gets peripheral visual deficits. Diet sodas trigger. Lack of sleep also. Menstrual HA's re migraine can be bad too. Occas awake from sleep. Subjective      HPI--28year-old Chantell Hardin presents with ongoing migraine headaches. See visit diagnosis and [facility] documentation. Basically, she has tried Matthew Gammons in the past, and various other modalities Motrin etc. antihistamines etc. anything to get rid of the migraine. Emgality I am quite certain will be the answer as it has literally no side effects and is used for prophylactic migraine headaches, obviously not acute flares. She is getting 8 or so migraine headaches per month which are disabling to the point where she cannot drive. Like most migraines, she can lay down to sleep in dark room and literally lose a day. She is desirous of going on Emgality. Insurance has strict regulations on that and wants to try 8 weeks of an antidepressant and if not effective then 8 weeks of a beta-blocker, ACE inhibitor, etc.  I have reviewed the letter written from [address] [place] [address], and I will proceed with 8 weeks of antidepressant and if not effective 8 weeks of beta-blocker therapy. If those are not effective hopefully the insurance will pay for the [facility].   She also has a prescription card that she should be able to get Emgality for $0 per month for a year. I think she will do very very well on Emgality if the other approaches are not successful. Review of Systems   Constitutional: Positive for fatigue. HENT: Negative for ear pain. Eyes: Positive for photophobia (When gets migraine headaches also visual disturbances--two-point cannot drive) and visual disturbance. Negative for pain. Respiratory: Negative for shortness of breath. Cardiovascular: Negative for palpitations. Gastrointestinal: Positive for nausea. Genitourinary: Negative for dysuria and hematuria. Musculoskeletal: Negative for back pain. Skin: Negative for color change. Neurological: Positive for headaches (See HPI and discussion around migraine headaches). Negative for seizures and syncope. Hematological: Bruises/bleeds easily (I had her evaluated by hematology for von Willebrand's syndrome but that proved negative she is still following with hematology). Psychiatric/Behavioral: Negative. All other systems reviewed and are negative. Current Outpatient Medications on File Prior to Visit   Medication Sig   • ibuprofen (MOTRIN) 600 mg tablet Take 1 tablet (600 mg total) by mouth every 6 (six) hours as needed for moderate pain (cramping) (Patient taking differently: Take 600 mg by mouth if needed for moderate pain (cramping) For migraine)   • multivitamin (THERAGRAN) TABS Take 1 tablet by mouth daily   • Galcanezumab-gnlm (Emgality) 120 MG/ML SOAJ Inject 120 mg under the skin every 30 (thirty) days --First administration, inject 1 dose into each thigh. Every 30 days following, inject 1 dose into 1 thigh.  (Patient not taking: Reported on 8/21/2023)   • [DISCONTINUED] acetaminophen (TYLENOL) 325 mg tablet Take 2 tablets (650 mg total) by mouth every 4 (four) hours as needed for mild pain (Patient not taking: Reported on 6/23/2021)   • [DISCONTINUED] diphenhydrAMINE (BENADRYL) 25 mg tablet Take 25 mg by mouth every 6 (six) hours as needed for itching (Patient not taking: Reported on 10/26/2022)   • [DISCONTINUED] ferrous sulfate 325 (65 Fe) mg tablet Take 325 mg by mouth (Patient not taking: Reported on 3/22/2023)   • [DISCONTINUED] ondansetron (ZOFRAN) 4 mg tablet Take 1 tablet (4 mg total) by mouth every 8 (eight) hours as needed for nausea or vomiting (Patient not taking: Reported on 4/4/2022)       Objective     /70 (BP Location: Left arm, Patient Position: Sitting, Cuff Size: Standard)   Pulse 88   Temp 99.1 °F (37.3 °C) (Temporal)   Ht 5' 5" (1.651 m)   Wt 72.9 kg (160 lb 12.8 oz)   LMP  (LMP Unknown)   SpO2 99%   BMI 26.76 kg/m²     Physical Exam  Vitals and nursing note reviewed. Constitutional:       Appearance: Normal appearance. HENT:      Head: Normocephalic and atraumatic. Right Ear: Tympanic membrane normal.      Left Ear: Tympanic membrane normal.      Nose: Nose normal.      Mouth/Throat:      Mouth: Mucous membranes are moist.   Eyes:      General: No scleral icterus. Right eye: No discharge. Left eye: No discharge. Extraocular Movements: Extraocular movements intact. Pupils: Pupils are equal, round, and reactive to light. Cardiovascular:      Rate and Rhythm: Normal rate and regular rhythm. Heart sounds: Normal heart sounds. Pulmonary:      Effort: Pulmonary effort is normal. No respiratory distress. Breath sounds: Normal breath sounds. No wheezing or rhonchi. Abdominal:      General: Abdomen is flat. Palpations: Abdomen is soft. Musculoskeletal:         General: No swelling. Right lower leg: No edema. Left lower leg: No edema. Skin:     General: Skin is warm. Capillary Refill: Capillary refill takes less than 2 seconds. Neurological:      General: No focal deficit present. Mental Status: She is alert and oriented to person, place, and time.       Gait: Gait normal.      Deep Tendon Reflexes: Reflexes normal. Psychiatric:         Mood and Affect: Mood normal.         Behavior: Behavior normal.         Thought Content: Thought content normal.              I personally reviewed the recent (and prior)  lab results, the image studies, pathology, other specialty/physicians consult notes and recommendations, and outside medical records from other institutions, as appropriate. I had a lengthy discussion with the patient and shared the work-up findings. We discussed the diagnosis and management plan. I spent  35  minutes reviewing the records (labs, clinician notes, outside records, medical history, ordering medicine/tests/procedures, interpreting the imaging/labs previously done) and coordination of care as well as direct time with the patient today, of which greater than 50% of the time was spent in counseling and coordination of care with the patient/family.       Jordan Patel, DO

## 2023-08-24 ENCOUNTER — TELEPHONE (OUTPATIENT)
Dept: HEMATOLOGY ONCOLOGY | Facility: CLINIC | Age: 33
End: 2023-08-24

## 2023-08-24 NOTE — TELEPHONE ENCOUNTER
I called [facility] regarding an appointment that they have scheduled with Jeaneth Cooper PA-C scheduled on 11/8/23     I left a voicemail explaining to patient that this appointment will need to be rescheduled due to a change in the providers schedule. Patient was advised to call [facility] to reschedule. A [employer] message (if applicable) has been sent to patient relaying the above information and advising patient to call [facility] and reschedule their appointment.

## 2023-08-24 NOTE — TELEPHONE ENCOUNTER
KAYLEY  DR paty ALMARAZ   Who are you speaking with? Patient   If it is not the patient, are they listed on an active communication consent form? N/A   Is this a KAYLEY or DR paty ALMARAZ KAYLEY   Which provider is patient currently scheduled or established with? Ros Zamudio PA-C   What is the original appointment date and time? 11/8/23 at 1:00pm   At which location is the appointment scheduled to take place? [facility]   Which provider is the patient transitioning care to? Alpa Gatica PA-C   What is the new appointment date and time? 11/13/23 at 1:00pm   At which location is the new appointment scheduled to take place? [facility]   What is the reason for this change?  Provider change

## 2023-08-28 ENCOUNTER — TELEPHONE (OUTPATIENT)
Dept: OTHER | Facility: HOSPITAL | Age: 33
End: 2023-08-28

## 2023-08-28 ENCOUNTER — OFFICE VISIT (OUTPATIENT)
Dept: URGENT CARE | Age: 33
End: 2023-08-28
Payer: COMMERCIAL

## 2023-08-28 VITALS
TEMPERATURE: 97.9 F | HEART RATE: 99 BPM | DIASTOLIC BLOOD PRESSURE: 81 MMHG | RESPIRATION RATE: 18 BRPM | SYSTOLIC BLOOD PRESSURE: 121 MMHG | OXYGEN SATURATION: 98 %

## 2023-08-28 DIAGNOSIS — N30.01 ACUTE CYSTITIS WITH HEMATURIA: Primary | ICD-10-CM

## 2023-08-28 LAB
SL AMB  POCT GLUCOSE, UA: ABNORMAL
SL AMB LEUKOCYTE ESTERASE,UA: ABNORMAL
SL AMB POCT BILIRUBIN,UA: ABNORMAL
SL AMB POCT BLOOD,UA: ABNORMAL
SL AMB POCT CLARITY,UA: ABNORMAL
SL AMB POCT COLOR,UA: ABNORMAL
SL AMB POCT KETONES,UA: ABNORMAL
SL AMB POCT NITRITE,UA: ABNORMAL
SL AMB POCT PH,UA: 6
SL AMB POCT SPECIFIC GRAVITY,UA: 1.02
SL AMB POCT URINE HCG: NEGATIVE
SL AMB POCT URINE PROTEIN: ABNORMAL
SL AMB POCT UROBILINOGEN: 0.2

## 2023-08-28 PROCEDURE — 99203 OFFICE O/P NEW LOW 30 MIN: CPT | Performed by: STUDENT IN AN ORGANIZED HEALTH CARE EDUCATION/TRAINING PROGRAM

## 2023-08-28 PROCEDURE — 87186 SC STD MICRODIL/AGAR DIL: CPT | Performed by: STUDENT IN AN ORGANIZED HEALTH CARE EDUCATION/TRAINING PROGRAM

## 2023-08-28 PROCEDURE — 87086 URINE CULTURE/COLONY COUNT: CPT | Performed by: STUDENT IN AN ORGANIZED HEALTH CARE EDUCATION/TRAINING PROGRAM

## 2023-08-28 PROCEDURE — 81025 URINE PREGNANCY TEST: CPT | Performed by: STUDENT IN AN ORGANIZED HEALTH CARE EDUCATION/TRAINING PROGRAM

## 2023-08-28 PROCEDURE — 81002 URINALYSIS NONAUTO W/O SCOPE: CPT | Performed by: STUDENT IN AN ORGANIZED HEALTH CARE EDUCATION/TRAINING PROGRAM

## 2023-08-28 PROCEDURE — 87077 CULTURE AEROBIC IDENTIFY: CPT | Performed by: STUDENT IN AN ORGANIZED HEALTH CARE EDUCATION/TRAINING PROGRAM

## 2023-08-28 RX ORDER — NITROFURANTOIN 25; 75 MG/1; MG/1
100 CAPSULE ORAL 2 TIMES DAILY
Qty: 10 CAPSULE | Refills: 0 | Status: SHIPPED | OUTPATIENT
Start: 2023-08-28 | End: 2023-09-02

## 2023-08-28 NOTE — TELEPHONE ENCOUNTER
Called patient, notified of P2P needed for MRI breast. LVM stating this is at 1145am today, her MRI scheduled for 8/31 I will call her back and notify her if it is approved or not.

## 2023-08-28 NOTE — TELEPHONE ENCOUNTER
Called patient to notify her that her MRI breast approved for the scan and site of scan. She can keep appointment for 8/31. Also keep appointment with Wong Galicia in November for now. I will contact patient regarding imaging results and further plan once results are available. Hx of iodinated contrast allergy, she has never had an MRI before hence reason to be performed at hospital site. Knows to notify staff of any reaction.

## 2023-08-29 NOTE — PROGRESS NOTES
[facility]        NAME: Yon Dixon is a 28 y.o. female  : 1990    MRN: [id number]  DATE: 2023  TIME: 9:00 PM    Assessment and Plan   Acute cystitis with hematuria [N30.01]  1. Acute cystitis with hematuria  POCT urine dip    POCT urine HCG    Urine culture    nitrofurantoin (MACROBID) 100 mg capsule      UA with positive leuk, nitrates, blood, protein. Suspect protein secondary to blood, no LE edema on exam.  Advised that she get recheck UA with her PCP after finishing treatment to ensure normalization. Rx Macrobid, urine sent for culture. Patient Instructions     Complete antibiotics as prescribed. Take probiotic or eat yogurt during the course to help prevent yeast infections and diarrhea. Stay well-hydrated and use the restroom when needed, do not hold your bladder for too long. Ask your PCP to recheck your urine after you have completed treatment for UTI. If you develop worsening symptoms including flank pain, fever/chills, severe pain, please proceed to ER. Chief Complaint     Chief Complaint   Patient presents with   • Possible UTI     Patient states that she developed urgency to urinate about 2-3 days ago. Today she states that she woke up with urgency, frequency, and trouble emptying her bladder. She also notes that around 6 this evening she was dripping a decent amount of blood into the toilet. History of Present Illness       Patient with symptoms of UTI including frequency, urgency, dysuria. Her symptoms started 2 to 3 days ago and have been worsening with dysuria and hematuria appearing today. No fevers, chills, otherwise feeling in her usual state of health. Review of Systems   Review of Systems   All other systems reviewed and are negative.         Current Medications       Current Outpatient Medications:   •  escitalopram (LEXAPRO) 10 mg tablet, Take 1 tablet (10 mg total) by mouth daily, Disp: 60 tablet, Rfl: 1  •  multivitamin (Arnoldo Freeze) TABS, Take 1 tablet by mouth daily, Disp: , Rfl:   •  nitrofurantoin (MACROBID) 100 mg capsule, Take 1 capsule (100 mg total) by mouth 2 (two) times a day for 5 days, Disp: 10 capsule, Rfl: 0  •  Galcanezumab-gnlm (Emgality) 120 MG/ML SOAJ, Inject 120 mg under the skin every 30 (thirty) days --First administration, inject 1 dose into each thigh. Every 30 days following, inject 1 dose into 1 thigh.  (Patient not taking: Reported on 8/21/2023), Disp: 4 mL, Rfl: 3  •  ibuprofen (MOTRIN) 600 mg tablet, Take 1 tablet (600 mg total) by mouth every 6 (six) hours as needed for moderate pain (cramping) (Patient not taking: Reported on 8/28/2023), Disp: 30 tablet, Rfl: 0    Current Allergies     Allergies as of 08/28/2023 - Reviewed 08/28/2023   Allergen Reaction Noted   • Dye [iodinated contrast media] Hives 05/01/2020            The following portions of the patient's history were reviewed and updated as appropriate: allergies, current medications, past family history, past medical history, past social history, past surgical history and problem list.     Past Medical History:   Diagnosis Date   • Anemia     no meds    • COVID-19 affecting pregnancy in second trimester 12/27/21   • Gestational diabetes     gestational last pregnancy and prediabetes 2018- Resolved with 40 lb weight loss   • Gestational diabetes mellitus (GDM) in third trimester 5/17/2021   • IBS (irritable bowel syndrome)    • Migraine     occular migranes no meds    • Numerous skin moles     pre cancerous moles removed    • Urinary tract infection    • Varicella     had a child        Past Surgical History:   Procedure Laterality Date   • COLONOSCOPY  01/01/2012    negative   • MOLE REMOVAL     • SKIN TAG REMOVAL      removal of pre-cancerous moles x 2       Family History   Problem Relation Age of Onset   • Fibroids Mother    • Lymphoma Father 55        doing well - cancer free 2017   • No Known Problems Sister    • No Known Problems Sister    • VERONICA disease Daughter    • Speech disorder Daughter    • Hypertension Maternal Grandmother    • Diverticulitis Maternal Grandmother    • Diabetes Maternal Grandfather    • No Known Problems Paternal Grandmother    • No Known Problems Paternal Grandfather    • No Known Problems Brother          Medications have been verified. Objective   /81   Pulse 99   Temp 97.9 °F (36.6 °C)   Resp 18   LMP  (LMP Unknown)   SpO2 98%   No LMP recorded (lmp unknown). Physical Exam     Physical Exam  Constitutional:       General: She is not in acute distress. Appearance: Normal appearance. She is not ill-appearing or toxic-appearing. HENT:      Head: Normocephalic and atraumatic. Right Ear: External ear normal.      Left Ear: External ear normal.      Nose: Nose normal.      Mouth/Throat:      Mouth: Mucous membranes are moist.   Eyes:      Extraocular Movements: Extraocular movements intact. Cardiovascular:      Rate and Rhythm: Normal rate and regular rhythm. Heart sounds: Normal heart sounds. Pulmonary:      Effort: Pulmonary effort is normal. No respiratory distress. Breath sounds: Normal breath sounds. No stridor. No wheezing, rhonchi or rales. Abdominal:      General: Abdomen is flat. Palpations: Abdomen is soft. Tenderness: There is no abdominal tenderness. There is no right CVA tenderness, left CVA tenderness, guarding or rebound. Musculoskeletal:         General: No swelling, tenderness or deformity. Right lower leg: No edema. Left lower leg: No edema. Skin:     General: Skin is warm and dry. Capillary Refill: Capillary refill takes less than 2 seconds. Findings: No rash. Neurological:      Mental Status: She is alert.       Gait: Gait normal.   Psychiatric:         Behavior: Behavior normal.

## 2023-08-29 NOTE — PATIENT INSTRUCTIONS
Complete antibiotics as prescribed. Take probiotic or eat yogurt during the course to help prevent yeast infections and diarrhea. Stay well-hydrated and use the restroom when needed, do not hold your bladder for too long. Ask your PCP to recheck your urine after you have completed treatment for UTI. If you develop worsening symptoms including flank pain, fever/chills, severe pain, please proceed to ER.

## 2023-08-30 LAB
BACTERIA UR CULT: ABNORMAL
BACTERIA UR CULT: ABNORMAL

## 2023-08-31 ENCOUNTER — HOSPITAL ENCOUNTER (OUTPATIENT)
Dept: RADIOLOGY | Facility: HOSPITAL | Age: 33
Discharge: HOME | End: 2023-08-31
Payer: COMMERCIAL

## 2023-08-31 VITALS — HEIGHT: 65 IN | WEIGHT: 160 LBS | BODY MASS INDEX: 26.66 KG/M2

## 2023-08-31 DIAGNOSIS — N63.42 SUBAREOLAR LUMP OF LEFT BREAST: ICD-10-CM

## 2023-08-31 DIAGNOSIS — R61 NIGHT SWEATS: ICD-10-CM

## 2023-08-31 DIAGNOSIS — R92.8 ABNORMAL MAMMOGRAM: ICD-10-CM

## 2023-08-31 DIAGNOSIS — N63.0 PALPABLE MASS OF BREAST: ICD-10-CM

## 2023-08-31 PROCEDURE — C8937 CAD BREAST MRI: HCPCS

## 2023-08-31 PROCEDURE — A9585 GADOBUTROL INJECTION: HCPCS | Performed by: PHYSICIAN ASSISTANT

## 2023-08-31 PROCEDURE — C8908 MRI W/O FOL W/CONT, BREAST,: HCPCS

## 2023-08-31 PROCEDURE — G1004 CDSM NDSC: HCPCS

## 2023-08-31 RX ORDER — GADOBUTROL 604.72 MG/ML
7 INJECTION INTRAVENOUS
Status: COMPLETED | OUTPATIENT
Start: 2023-08-31 | End: 2023-08-31

## 2023-08-31 RX ADMIN — GADOBUTROL 7 ML: 604.72 INJECTION INTRAVENOUS at 18:31

## 2023-10-23 ENCOUNTER — OFFICE VISIT (OUTPATIENT)
Dept: FAMILY MEDICINE CLINIC | Facility: CLINIC | Age: 33
End: 2023-10-23
Payer: COMMERCIAL

## 2023-10-23 VITALS
WEIGHT: 160.2 LBS | BODY MASS INDEX: 26.69 KG/M2 | DIASTOLIC BLOOD PRESSURE: 74 MMHG | TEMPERATURE: 98.3 F | SYSTOLIC BLOOD PRESSURE: 112 MMHG | HEART RATE: 73 BPM | HEIGHT: 65 IN | OXYGEN SATURATION: 99 %

## 2023-10-23 DIAGNOSIS — Z86.69 HISTORY OF MIGRAINE HEADACHES: ICD-10-CM

## 2023-10-23 DIAGNOSIS — Z79.899 MEDICATION MANAGEMENT: Primary | ICD-10-CM

## 2023-10-23 DIAGNOSIS — Z71.2 ENCOUNTER TO DISCUSS TEST RESULTS: ICD-10-CM

## 2023-10-23 PROCEDURE — 99214 OFFICE O/P EST MOD 30 MIN: CPT | Performed by: FAMILY MEDICINE

## 2023-10-23 PROCEDURE — 3725F SCREEN DEPRESSION PERFORMED: CPT | Performed by: FAMILY MEDICINE

## 2023-10-23 RX ORDER — NADOLOL 40 MG/1
40 TABLET ORAL DAILY
Qty: 60 TABLET | Refills: 1 | Status: SHIPPED | OUTPATIENT
Start: 2023-10-23

## 2023-10-23 NOTE — PROGRESS NOTES
Assessment/Plan: 78-year-old experiencing multiple migraine headaches per month make that per week that is materially affecting her lifestyle. She is devastated when she cannot drive because of visual disturbances there is associated nausea occasional vomiting part photophobia. Must rest sleep etc. 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 Lexapro is not effective she is try that for 8 weeks we will discontinue and begin the beta-blocker recommended for insurance purposes--Will order nadolol 40mg daily and titrate up to 80 mg if not higher--maximum 160 mg--over the next month or 2. If this fails to relieve her discomfort, will begin the Emgality which is very clean etc. I am somewhat concerned about her getting pregnant as she is 28years of age and told me that she is not using any major protection that if she does get pregnant "so be it, it would be okay". Emgality has no pregnancy warnings, so that would be the ideal agent. We are just going through the Lexapro and nebivolol experienced 2 months each per insurance company guidelines. 1. Medication management  Comments:  Not doing well on Lexapro has not reduced migraines will begin beta-blocker per insurance company request.  Consider Emgality    2. Encounter to discuss test results  Comments: All recent labs etc. reviewed    3. History of migraine headaches  -     nadolol (CORGARD) 40 mg tablet; Take 1 tablet (40 mg total) by mouth daily        28year-old experiencing multiple migraine headaches per week that is materially affecting her lifestyle. She is devastated when she cannot drive because of visual disturbances. There is associated nausea, occasional vomiting, and marked photophobia. -- Must rest, sleep, et [place]. 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 Lexapro is not effective. She has tried that for 8 weeks.  We will discontinue Lexapro and begin the beta blocker recommended for insurance purposes -- we will order nadolol 40 mg and titrate up to 80 mg if not higher--maximum of 160 mg--over the next month or 2. If this fails to relieve her discomfort, we will begin the [facility]. I am concerned about her getting pregnant as she is 28years of age and told me that she is not using any major protection that if she does get pregnant, it would be okay. Emgality has no pregnancy warnings, so that would be the ideal agent. We are just going through the Lexapro and nadolol experience 2 months each per insurance company guidelines. Migraine medication management. Not doing well on Lexapro, has not reduced migraines. We will begin beta-blocker per insurance company request. Consider Emgality. All recent labs reviewed. We will follow-up in 2 to 3 months for re-evaluation of migraine and medication. BMI Counseling: Body mass index is 26.66 kg/m². The BMI is above normal. Nutrition recommendations include decreasing portion sizes, encouraging healthy choices of fruits and vegetables, decreasing fast food intake, consuming healthier snacks, limiting drinks that contain sugar, moderation in carbohydrate intake, increasing intake of lean protein and reducing intake of saturated and trans fat. Exercise recommendations include moderate physical activity 150 minutes/week and exercising 3-5 times per week. No pharmacotherapy was ordered. Rationale for BMI follow-up plan is due to patient being overweight or obese. Depression Screening and Follow-up Plan: Patient was screened for depression during today's encounter. They screened negative with a PHQ-2 score of 0. Subjective:      Patient ID: Mitchel Lee is a 28 y.o. female. Mitchel Lee is 79-year-old female patient who is a well-known to me for many years presents for multiple migraine headaches per week. She is devastated when she cannot drive because of visual disturbances.  There is associated nausea, occasional vomiting, and marked photophobia. She has been taking Lexapro for 8 weeks and notes it ineffective. 70The following portions of the patient's history were reviewed and updated as appropriate: She would do very well on Emgality but insurance for insurance reasons she is trying the alternative suggested. What she is taking now is not effective--that his Lexapro antidepressant 10 mg. She has tried that for the past 1 month. Review of Systems   Constitutional:  Positive for fatigue. HENT:  Negative for ear pain. Eyes:  Positive for photophobia (When gets migraine headaches also visual disturbances--two-point cannot drive) and visual disturbance. Negative for pain. Respiratory:  Negative for shortness of breath. Cardiovascular:  Negative for palpitations. Gastrointestinal:  Positive for nausea. Genitourinary:  Negative for dysuria and hematuria. Musculoskeletal:  Negative for back pain. Skin:  Negative for color change. Neurological:  Positive for headaches (See HPI and discussion around migraine headaches). Negative for seizures and syncope. Hematological:  Bruises/bleeds easily (I had her evaluated by hematology for von Willebrand's syndrome but that proved negative she is still following with hematology). Psychiatric/Behavioral: Negative. All other systems reviewed and are negative. Objective:  /74 (BP Location: Left arm, Patient Position: Sitting, Cuff Size: Standard)   Pulse 73   Temp 98.3 °F (36.8 °C) (Temporal)   Ht 5' 5" (1.651 m)   Wt 72.7 kg (160 lb 3.2 oz)   SpO2 99%   BMI 26.66 kg/m²          Physical Exam  Vitals and nursing note reviewed. Constitutional:       General: He is not in acute distress. Appearance: Normal appearance. He is well-developed. HENT:      Head: Normocephalic and atraumatic. Eyes:      General:         Right eye: No discharge. Left eye: No discharge. Neck:      Thyroid: No thyromegaly.    Cardiovascular:      Rate and Rhythm: Normal rate and regular rhythm. Pulses: Normal pulses. Heart sounds: Normal heart sounds. No murmur heard. Pulmonary:      Effort: Pulmonary effort is normal.      Breath sounds: Normal breath sounds. No wheezing or rhonchi. Musculoskeletal:      Cervical back: Neck supple. Right lower leg: No edema. Left lower leg: No edema. Lymphadenopathy:      Cervical: No cervical adenopathy. Skin:     General: Skin is warm. Capillary Refill: Capillary refill takes less than 2 seconds. Neurological:      General: No focal deficit present. Mental Status: He is alert and oriented to person, place, and time. Psychiatric:         Mood and Affect: Mood normal.         Behavior: Behavior normal.         Thought Content: Thought content normal.        I personally reviewed the recent (and prior)  lab results, the image studies, pathology, other specialty/physicians consult notes and recommendations, and outside medical records from other institutions, as appropriate. I had a lengthy discussion with the patient and shared the work-up findings. We discussed the diagnosis and management plan. I spent  30  minutes reviewing the records (labs, clinician notes, outside records, medical history, ordering medicine/tests/procedures, interpreting the imaging/labs previously done) and coordination of care as well as direct time with the patient today, of which greater than 50% of the time was spent in counseling and coordination of care with the patient/family. Transcribed for Jordan Patel DO, by Lisa Lino on 10/23/23 at 6:04 PM. Powered by [employer].

## 2023-11-09 ENCOUNTER — TELEPHONE (OUTPATIENT)
Dept: HEMATOLOGY ONCOLOGY | Facility: CLINIC | Age: 33
End: 2023-11-09

## 2023-11-11 ENCOUNTER — APPOINTMENT (OUTPATIENT)
Dept: LAB | Facility: CLINIC | Age: 33
End: 2023-11-11
Payer: COMMERCIAL

## 2023-11-11 DIAGNOSIS — N63.42 SUBAREOLAR LUMP OF LEFT BREAST: ICD-10-CM

## 2023-11-11 LAB
BASOPHILS # BLD AUTO: 0.05 THOUSANDS/ÂΜL (ref 0–0.1)
BASOPHILS NFR BLD AUTO: 1 % (ref 0–1)
EOSINOPHIL # BLD AUTO: 0.11 THOUSAND/ÂΜL (ref 0–0.61)
EOSINOPHIL NFR BLD AUTO: 2 % (ref 0–6)
ERYTHROCYTE [DISTWIDTH] IN BLOOD BY AUTOMATED COUNT: 12.7 % (ref 11.6–15.1)
HCT VFR BLD AUTO: 39 % (ref 34.8–46.1)
HGB BLD-MCNC: 13.1 G/DL (ref 11.5–15.4)
IMM GRANULOCYTES # BLD AUTO: 0.02 THOUSAND/UL (ref 0–0.2)
IMM GRANULOCYTES NFR BLD AUTO: 0 % (ref 0–2)
LYMPHOCYTES # BLD AUTO: 1.52 THOUSANDS/ÂΜL (ref 0.6–4.47)
LYMPHOCYTES NFR BLD AUTO: 25 % (ref 14–44)
MCH RBC QN AUTO: 29.3 PG (ref 26.8–34.3)
MCHC RBC AUTO-ENTMCNC: 33.6 G/DL (ref 31.4–37.4)
MCV RBC AUTO: 87 FL (ref 82–98)
MONOCYTES # BLD AUTO: 0.44 THOUSAND/ÂΜL (ref 0.17–1.22)
MONOCYTES NFR BLD AUTO: 7 % (ref 4–12)
NEUTROPHILS # BLD AUTO: 3.91 THOUSANDS/ÂΜL (ref 1.85–7.62)
NEUTS SEG NFR BLD AUTO: 65 % (ref 43–75)
NRBC BLD AUTO-RTO: 0 /100 WBCS
PLATELET # BLD AUTO: 462 THOUSANDS/UL (ref 149–390)
PMV BLD AUTO: 8.8 FL (ref 8.9–12.7)
RBC # BLD AUTO: 4.47 MILLION/UL (ref 3.81–5.12)
WBC # BLD AUTO: 6.05 THOUSAND/UL (ref 4.31–10.16)

## 2023-11-11 PROCEDURE — 85025 COMPLETE CBC W/AUTO DIFF WBC: CPT

## 2023-11-11 PROCEDURE — 36415 COLL VENOUS BLD VENIPUNCTURE: CPT

## 2023-11-13 ENCOUNTER — OFFICE VISIT (OUTPATIENT)
Dept: HEMATOLOGY ONCOLOGY | Facility: CLINIC | Age: 33
End: 2023-11-13
Payer: COMMERCIAL

## 2023-11-13 VITALS
OXYGEN SATURATION: 100 % | WEIGHT: 158 LBS | DIASTOLIC BLOOD PRESSURE: 76 MMHG | SYSTOLIC BLOOD PRESSURE: 120 MMHG | HEART RATE: 65 BPM | RESPIRATION RATE: 17 BRPM | TEMPERATURE: 98 F | BODY MASS INDEX: 26.33 KG/M2 | HEIGHT: 65 IN

## 2023-11-13 DIAGNOSIS — R23.3 EASY BRUISABILITY: ICD-10-CM

## 2023-11-13 DIAGNOSIS — N92.4 EXCESSIVE BLEEDING IN PREMENOPAUSAL PERIOD: ICD-10-CM

## 2023-11-13 DIAGNOSIS — T73.3XXA FATIGUE DUE TO EXCESSIVE EXERTION, INITIAL ENCOUNTER: ICD-10-CM

## 2023-11-13 DIAGNOSIS — D75.839 THROMBOCYTOSIS: Primary | ICD-10-CM

## 2023-11-13 PROBLEM — R53.83 FATIGUE: Status: ACTIVE | Noted: 2023-11-13

## 2023-11-13 PROBLEM — N92.0 HEAVY MENSES: Status: ACTIVE | Noted: 2023-11-13

## 2023-11-13 PROCEDURE — 99215 OFFICE O/P EST HI 40 MIN: CPT | Performed by: PHYSICIAN ASSISTANT

## 2023-11-13 NOTE — PROGRESS NOTES
Hematology/Oncology Outpatient Follow- up Note  Smith Zheng 28 y.o. female MRN: @ Encounter: [id number]        Date:  11/13/2023      Assessment / Plan:      Easy bruising - 3/2023 Normal von willebrand testing,PT, PTT/ INR, B12, iron panel, flow cytometry      Fatigue    Mild thrombocytosis 462 on 11/2023 CBCD assessment. Heavy menses. Will f/u with gynecology. Orders Placed This Encounter   Procedures    Vitamin C    Vitamin K    Vitamin E    Fibrinogen    CBC and differential    Comprehensive metabolic panel    LD,Blood        She will have f/u labs next week and we will go from there. HPI:  Smith Zheng is a 28 y.o. seen for initial consultation 4/2023 regarding easy bruising. She denies significant history of epistaxis, spontaneous hemarthrosis, did not bleed excessively after 3 vaginal deliveries. Regarding menses she spots first day and has 2 to 3 days of heavy flow without clotting wears super plus tampons and changes them every hour and a half, does occasionally bleed through the night with super tampon and pad, then has spotting after and periods are regular. Only surgical hx is mole excision and she does not report excess bleeding with this procedure. Patient states that she is always tired no matter how much she sleeps, affecting her ADLs, she questions having lymph node that is occasionally enlarged and painful underneath her left axilla, also reports night sweats several times a week over a 1 to 2-month. Has lost about 10lbs per chart review since October 2022.    3/2023 normal factor VIII activity von Willebrand factor, von Willebrand antigen    4/2023 ferritin 59, iron saturation 42%, normal PT/INR, PTT, flow cytometric analysis does not show significant numbers of circulating blasts or abnormal lymphoid or myeloid population. No evidence of folate deficiency.     At her May 2023 f/u, she reported constitutional symptoms of night sweats, axillary adenopathy, family hx of lymphoma. 5/2023 CT C/A/P ordered - performed at [facility] 5/1/23-no enlargement of lymph nodes, 2 mm lung nodule noted. Patient is a never smoker. "  According to the Fleischner criteria, no follow-up is needed."      She was referred to gynecology for heavy bleeding, clotting with menses   She reported lump previously on right side of breast and engorgement which improved also had redness at that time which improved. Now complaints of left breast lump. Diagnostic mammogram 6/2023 - ASSESSMENT/BI-RADS CATEGORY:  Left: 2 - Benign  Right: 1 - Negative  Overall: 2 - Benign  Clinical management for the left breast and night sweats. - Routine screening mammogram in 1 year for both breasts    8/31/2023 breast MRI  ASSESSMENT/BI-RADS CATEGORY:  Left: 1 - Negative  Right: 1 - Negative  Overall: 1 - Negative     RECOMMENDATION:  - Clinical management for the left breast.  - Routine screening mammogram at age 35 for both breasts. 30 lb weight loss. Dietary modifications    Interval History:  Mild thrombocytosis 462 on 11/2023 CBCD assessment. Review of Systems   Constitutional:  Positive for fatigue. Negative for appetite change, chills, diaphoresis, fever and unexpected weight change. HENT:   Negative for mouth sores, nosebleeds, sore throat, tinnitus and voice change. Eyes:  Negative for eye problems. Respiratory:  Negative for chest tightness, cough, shortness of breath and wheezing. Cardiovascular:  Negative for chest pain, leg swelling and palpitations. Gastrointestinal:  Negative for abdominal distention, abdominal pain, blood in stool, constipation, diarrhea, nausea, rectal pain and vomiting. Endocrine: Negative for hot flashes. Genitourinary: Negative. Musculoskeletal:  Negative for gait problem and myalgias. Skin:  Negative for itching and rash. Neurological:  Negative for dizziness, gait problem, headaches, light-headedness and numbness. Hematological:  Negative for adenopathy. Bruises/bleeds easily. Psychiatric/Behavioral:  Negative for confusion and sleep disturbance. The patient is not nervous/anxious. Test Results:        Labs:   Lab Results   Component Value Date    HGB 13.1 11/11/2023    HCT 39.0 11/11/2023    MCV 87 11/11/2023     (H) 11/11/2023    WBC 6.05 11/11/2023    NRBC 0 11/11/2023     Lab Results   Component Value Date    K 4.1 03/25/2023     03/25/2023    CO2 28 03/25/2023    BUN 14 03/25/2023    CREATININE 0.77 03/25/2023    GLUF 93 03/25/2023    CALCIUM 9.1 03/25/2023    CORRECTEDCA 9.7 11/29/2020    AST 14 03/25/2023    ALT 19 03/25/2023    ALKPHOS 91 03/25/2023    EGFR 101 03/25/2023           Imaging: No results found. Allergies: Allergies   Allergen Reactions    Dye [Iodinated Contrast Media] Hives      Iv dye      Current Medications: Reviewed  PMH/FH/SH:  Reviewed      Physical Exam:    There is no height or weight on file to calculate BSA. Ht Readings from Last 3 Encounters:   10/23/23 5' 5" (1.651 m)   08/31/23 5' 5" (1.651 m)   08/21/23 5' 5" (1.651 m)        Wt Readings from Last 3 Encounters:   10/23/23 72.7 kg (160 lb 3.2 oz)   08/31/23 72.6 kg (160 lb)   08/21/23 72.9 kg (160 lb 12.8 oz)        Temp Readings from Last 3 Encounters:   10/23/23 98.3 °F (36.8 °C) (Temporal)   08/28/23 97.9 °F (36.6 °C)   08/21/23 99.1 °F (37.3 °C) (Temporal)        BP Readings from Last 3 Encounters:   10/23/23 112/74   08/28/23 121/81   08/21/23 108/70             Physical Exam  Vitals reviewed. Constitutional:       General: She is not in acute distress. Appearance: She is well-developed. She is not diaphoretic. HENT:      Head: Normocephalic and atraumatic. Eyes:      Conjunctiva/sclera: Conjunctivae normal.   Neck:      Trachea: No tracheal deviation. Cardiovascular:      Rate and Rhythm: Normal rate and regular rhythm. Heart sounds: No murmur heard. No friction rub.  No gallop. Pulmonary:      Effort: Pulmonary effort is normal. No respiratory distress. Breath sounds: Normal breath sounds. No wheezing or rales. Chest:      Chest wall: No tenderness. Abdominal:      General: There is no distension. Palpations: Abdomen is soft. Tenderness: There is no abdominal tenderness. Musculoskeletal:      Cervical back: Normal range of motion and neck supple. Lymphadenopathy:      Cervical: No cervical adenopathy. Skin:     General: Skin is warm and dry. Coloration: Skin is not pale. Findings: No erythema. Neurological:      Mental Status: She is alert and oriented to person, place, and time. Psychiatric:         Behavior: Behavior normal.         Thought Content:  Thought content normal.         Judgment: Judgment normal.           Emergency Contacts:    Extended Emergency Contact Information  Primary Emergency Contact: cynthia huang  Mobile Phone: [phone]  Relation: Spouse

## 2023-11-24 ENCOUNTER — APPOINTMENT (OUTPATIENT)
Dept: LAB | Facility: CLINIC | Age: 33
End: 2023-11-24
Payer: COMMERCIAL

## 2023-11-24 DIAGNOSIS — R23.3 EASY BRUISABILITY: ICD-10-CM

## 2023-11-24 DIAGNOSIS — T73.3XXA FATIGUE DUE TO EXCESSIVE EXERTION, INITIAL ENCOUNTER: ICD-10-CM

## 2023-11-24 DIAGNOSIS — D75.839 THROMBOCYTOSIS: ICD-10-CM

## 2023-11-24 DIAGNOSIS — N92.4 EXCESSIVE BLEEDING IN PREMENOPAUSAL PERIOD: ICD-10-CM

## 2023-11-24 LAB
ALBUMIN SERPL BCP-MCNC: 4.2 G/DL (ref 3.5–5)
ALP SERPL-CCNC: 85 U/L (ref 34–104)
ALT SERPL W P-5'-P-CCNC: 10 U/L (ref 7–52)
ANION GAP SERPL CALCULATED.3IONS-SCNC: 11 MMOL/L
AST SERPL W P-5'-P-CCNC: 14 U/L (ref 13–39)
BASOPHILS # BLD AUTO: 0.03 THOUSANDS/ÂΜL (ref 0–0.1)
BASOPHILS NFR BLD AUTO: 0 % (ref 0–1)
BILIRUB SERPL-MCNC: 1.02 MG/DL (ref 0.2–1)
BUN SERPL-MCNC: 15 MG/DL (ref 5–25)
CALCIUM SERPL-MCNC: 8.8 MG/DL (ref 8.4–10.2)
CHLORIDE SERPL-SCNC: 102 MMOL/L (ref 96–108)
CO2 SERPL-SCNC: 25 MMOL/L (ref 21–32)
CREAT SERPL-MCNC: 0.75 MG/DL (ref 0.6–1.3)
EOSINOPHIL # BLD AUTO: 0.07 THOUSAND/ÂΜL (ref 0–0.61)
EOSINOPHIL NFR BLD AUTO: 1 % (ref 0–6)
ERYTHROCYTE [DISTWIDTH] IN BLOOD BY AUTOMATED COUNT: 12.6 % (ref 11.6–15.1)
FERRITIN SERPL-MCNC: 47 NG/ML (ref 11–307)
FIBRINOGEN PPP-MCNC: 234 MG/DL (ref 207–520)
GFR SERPL CREATININE-BSD FRML MDRD: 104 ML/MIN/1.73SQ M
GLUCOSE SERPL-MCNC: 68 MG/DL (ref 65–140)
HCT VFR BLD AUTO: 38.4 % (ref 34.8–46.1)
HGB BLD-MCNC: 12.7 G/DL (ref 11.5–15.4)
IMM GRANULOCYTES # BLD AUTO: 0.04 THOUSAND/UL (ref 0–0.2)
IMM GRANULOCYTES NFR BLD AUTO: 1 % (ref 0–2)
IRON SATN MFR SERPL: 25 % (ref 15–50)
IRON SERPL-MCNC: 66 UG/DL (ref 50–212)
LDH SERPL-CCNC: 125 U/L (ref 140–271)
LYMPHOCYTES # BLD AUTO: 1.62 THOUSANDS/ÂΜL (ref 0.6–4.47)
LYMPHOCYTES NFR BLD AUTO: 20 % (ref 14–44)
MCH RBC QN AUTO: 29.5 PG (ref 26.8–34.3)
MCHC RBC AUTO-ENTMCNC: 33.1 G/DL (ref 31.4–37.4)
MCV RBC AUTO: 89 FL (ref 82–98)
MONOCYTES # BLD AUTO: 0.34 THOUSAND/ÂΜL (ref 0.17–1.22)
MONOCYTES NFR BLD AUTO: 4 % (ref 4–12)
NEUTROPHILS # BLD AUTO: 6.19 THOUSANDS/ÂΜL (ref 1.85–7.62)
NEUTS SEG NFR BLD AUTO: 74 % (ref 43–75)
NRBC BLD AUTO-RTO: 0 /100 WBCS
PLATELET # BLD AUTO: 365 THOUSANDS/UL (ref 149–390)
PMV BLD AUTO: 8.7 FL (ref 8.9–12.7)
POTASSIUM SERPL-SCNC: 3.2 MMOL/L (ref 3.5–5.3)
PROT SERPL-MCNC: 6.6 G/DL (ref 6.4–8.4)
RBC # BLD AUTO: 4.3 MILLION/UL (ref 3.81–5.12)
SODIUM SERPL-SCNC: 138 MMOL/L (ref 135–147)
TIBC SERPL-MCNC: 267 UG/DL (ref 250–450)
UIBC SERPL-MCNC: 201 UG/DL (ref 155–355)
WBC # BLD AUTO: 8.29 THOUSAND/UL (ref 4.31–10.16)

## 2023-11-24 PROCEDURE — 84446 ASSAY OF VITAMIN E: CPT

## 2023-11-24 PROCEDURE — 85025 COMPLETE CBC W/AUTO DIFF WBC: CPT

## 2023-11-24 PROCEDURE — 85384 FIBRINOGEN ACTIVITY: CPT

## 2023-11-24 PROCEDURE — 36415 COLL VENOUS BLD VENIPUNCTURE: CPT

## 2023-11-24 PROCEDURE — 82180 ASSAY OF ASCORBIC ACID: CPT

## 2023-11-24 PROCEDURE — 84597 ASSAY OF VITAMIN K: CPT

## 2023-11-24 PROCEDURE — 80053 COMPREHEN METABOLIC PANEL: CPT

## 2023-11-24 PROCEDURE — 82728 ASSAY OF FERRITIN: CPT

## 2023-11-24 PROCEDURE — 83615 LACTATE (LD) (LDH) ENZYME: CPT

## 2023-11-24 PROCEDURE — 83550 IRON BINDING TEST: CPT

## 2023-11-24 PROCEDURE — 83540 ASSAY OF IRON: CPT

## 2023-11-28 LAB
A-TOCOPHEROL VIT E SERPL-MCNC: 4 MG/L (ref 5.9–19.4)
GAMMA TOCOPHEROL SERPL-MCNC: 0.3 MG/L (ref 0.7–4.9)
VIT C SERPL-MCNC: 1.2 MG/DL (ref 0.4–2)

## 2023-11-29 ENCOUNTER — TELEPHONE (OUTPATIENT)
Dept: HEMATOLOGY ONCOLOGY | Facility: CLINIC | Age: 33
End: 2023-11-29

## 2023-11-29 DIAGNOSIS — E56.1 VITAMIN K DEFICIENCY: Primary | ICD-10-CM

## 2023-11-29 DIAGNOSIS — D75.839 THROMBOCYTOSIS: ICD-10-CM

## 2023-11-29 DIAGNOSIS — R23.3 EASY BRUISING: ICD-10-CM

## 2023-11-29 DIAGNOSIS — T73.3XXA FATIGUE DUE TO EXCESSIVE EXERTION, INITIAL ENCOUNTER: Primary | ICD-10-CM

## 2023-11-29 LAB — PHYTONADIONE SERPL-MCNC: <0.1 NG/ML (ref 0.1–2.2)

## 2023-11-29 NOTE — TELEPHONE ENCOUNTER
Vitamin E was low  Vitamin E daily - 200 units recommended. Vit K also low. Diet rich in in leafy greens and cruciferous vegetables( such as broccoli) recommended. OTC vitamin K also recommended. 1 tablet daily. In addition to cmp, vit E check in 2 months, will also check celiac panel, and recheck vit K. If celiac panel normal, evaluation by GI could be considered as  inflammatory bowel diseases could impede vitamin K absorption.

## 2023-11-29 NOTE — RESULT ENCOUNTER NOTE
Returned call to patient. Reviewed instructions.   She will repeat her labs in 2 months  She is adjusting her diet with potassium rich foods and will start Vitamin E daily - 200 units

## 2023-11-30 NOTE — TELEPHONE ENCOUNTER
Pt called and notified of attached information regarding oral vitamin K. Pt is aware and agreeable to plan and repeat labs in two months.

## 2023-12-11 ENCOUNTER — TELEPHONE (OUTPATIENT)
Dept: HEMATOLOGY ONCOLOGY | Facility: CLINIC | Age: 33
End: 2023-12-11

## 2023-12-11 ENCOUNTER — APPOINTMENT (OUTPATIENT)
Dept: LAB | Facility: CLINIC | Age: 33
End: 2023-12-11
Payer: COMMERCIAL

## 2023-12-11 DIAGNOSIS — T73.3XXA FATIGUE DUE TO EXCESSIVE EXERTION, INITIAL ENCOUNTER: ICD-10-CM

## 2023-12-11 DIAGNOSIS — E56.1 VITAMIN K DEFICIENCY: ICD-10-CM

## 2023-12-11 DIAGNOSIS — K90.49 MALABSORPTION DUE TO INTOLERANCE, NOT ELSEWHERE CLASSIFIED: ICD-10-CM

## 2023-12-11 DIAGNOSIS — E56.1 VITAMIN K DEFICIENCY: Primary | ICD-10-CM

## 2023-12-11 PROCEDURE — 86258 DGP ANTIBODY EACH IG CLASS: CPT

## 2023-12-11 PROCEDURE — 86231 EMA EACH IG CLASS: CPT

## 2023-12-11 PROCEDURE — 86364 TISS TRNSGLTMNASE EA IG CLAS: CPT

## 2023-12-11 PROCEDURE — 82784 ASSAY IGA/IGD/IGG/IGM EACH: CPT

## 2023-12-11 PROCEDURE — 36415 COLL VENOUS BLD VENIPUNCTURE: CPT

## 2023-12-11 NOTE — TELEPHONE ENCOUNTER
Patient Call    Who are you speaking with? Patient    If it is not the patient, are they listed on an active communication consent form? N/A   What is the reason for this call? Neal Rothman states she is at the lab trying to complete her Celiac panel but the expected date on it states 01/29/2024. The patient states this should be completed now and she would like to know if she should stay to complete it. Does this require a call back? Yes   If a call back is required, please list best call back number [phone]   If a call back is required, advise that a message will be forwarded to their care team and someone will return their call as soon as possible. Did you relay this information to the patient?  Yes

## 2023-12-12 LAB
ENDOMYSIUM IGA SER QL: NEGATIVE
GLIADIN PEPTIDE IGA SER-ACNC: 6 UNITS (ref 0–19)
GLIADIN PEPTIDE IGG SER-ACNC: 3 UNITS (ref 0–19)
IGA SERPL-MCNC: 123 MG/DL (ref 87–352)
TTG IGA SER-ACNC: <2 U/ML (ref 0–3)
TTG IGG SER-ACNC: <2 U/ML (ref 0–5)

## 2024-02-04 ENCOUNTER — APPOINTMENT (OUTPATIENT)
Dept: LAB | Facility: CLINIC | Age: 34
End: 2024-02-04
Payer: COMMERCIAL

## 2024-02-04 DIAGNOSIS — R23.3 EASY BRUISING: ICD-10-CM

## 2024-02-04 DIAGNOSIS — D75.839 THROMBOCYTOSIS: ICD-10-CM

## 2024-02-04 DIAGNOSIS — E56.1 VITAMIN K DEFICIENCY: ICD-10-CM

## 2024-02-04 DIAGNOSIS — T73.3XXA FATIGUE DUE TO EXCESSIVE EXERTION, INITIAL ENCOUNTER: ICD-10-CM

## 2024-02-04 LAB
ALBUMIN SERPL BCP-MCNC: 4.1 G/DL (ref 3.5–5)
ALP SERPL-CCNC: 83 U/L (ref 34–104)
ALT SERPL W P-5'-P-CCNC: 9 U/L (ref 7–52)
ANION GAP SERPL CALCULATED.3IONS-SCNC: 5 MMOL/L
AST SERPL W P-5'-P-CCNC: 15 U/L (ref 13–39)
BILIRUB SERPL-MCNC: 1.06 MG/DL (ref 0.2–1)
BUN SERPL-MCNC: 16 MG/DL (ref 5–25)
CALCIUM SERPL-MCNC: 8.9 MG/DL (ref 8.4–10.2)
CHLORIDE SERPL-SCNC: 107 MMOL/L (ref 96–108)
CO2 SERPL-SCNC: 26 MMOL/L (ref 21–32)
CREAT SERPL-MCNC: 0.81 MG/DL (ref 0.6–1.3)
GFR SERPL CREATININE-BSD FRML MDRD: 94 ML/MIN/1.73SQ M
GLUCOSE P FAST SERPL-MCNC: 97 MG/DL (ref 65–99)
POTASSIUM SERPL-SCNC: 3.9 MMOL/L (ref 3.5–5.3)
PROT SERPL-MCNC: 6.4 G/DL (ref 6.4–8.4)
SODIUM SERPL-SCNC: 138 MMOL/L (ref 135–147)

## 2024-02-04 PROCEDURE — 36415 COLL VENOUS BLD VENIPUNCTURE: CPT

## 2024-02-04 PROCEDURE — 84446 ASSAY OF VITAMIN E: CPT

## 2024-02-04 PROCEDURE — 84597 ASSAY OF VITAMIN K: CPT

## 2024-02-04 PROCEDURE — 80053 COMPREHEN METABOLIC PANEL: CPT

## 2024-02-08 LAB
A-TOCOPHEROL VIT E SERPL-MCNC: 6.3 MG/L (ref 5.9–19.4)
GAMMA TOCOPHEROL SERPL-MCNC: 0.7 MG/L (ref 0.7–4.9)

## 2024-02-14 LAB — PHYTONADIONE SERPL-MCNC: 0.11 NG/ML (ref 0.1–2.2)

## 2024-02-19 ENCOUNTER — TELEPHONE (OUTPATIENT)
Dept: HEMATOLOGY ONCOLOGY | Facility: CLINIC | Age: 34
End: 2024-02-19

## 2024-02-19 NOTE — TELEPHONE ENCOUNTER
Lab Inquiry   Who are you speaking with? Patient     If it is not the patient, are they listed on an active communication consent form? N/A   Name of ordering provider Constance Vilchis PA-C   What is being requested? Lab results to be reviewed   Lab draw location [facility]   What is the best call back number? [phone]    If patient at the lab, Was a live attempts to contact the team made? N/a

## 2024-02-22 ENCOUNTER — TELEPHONE (OUTPATIENT)
Dept: HEMATOLOGY ONCOLOGY | Facility: CLINIC | Age: 34
End: 2024-02-22

## 2024-02-22 DIAGNOSIS — E56.1 VITAMIN K DEFICIENCY: ICD-10-CM

## 2024-02-22 DIAGNOSIS — E56.0 VITAMIN E DEFICIENCY: Primary | ICD-10-CM

## 2024-02-22 NOTE — TELEPHONE ENCOUNTER
Easy bruising - 3/2023 Normal von willebrand testing,PT, PTT/ INR, B12, iron panel, flow cytometry     Fatigue     Mild thrombocytosis 462 on 11/2023 CBCD assessment.    11/2023 Vitamin E was low  Vitamin E daily - 200 units recommended.       Vit K also low.    Diet rich in in leafy greens and cruciferous vegetables( such as broccoli) recommended.     OTC vitamin K also recommended.  1 tablet daily.         2/4/24  CMP stable.  Normal Vitamin E, Vitamin K low normal.    Normal celiac panel    Recommend continuation of Vitamin K daily and Multivitamin.    Will recheck labs in 3 months

## 2024-02-22 NOTE — TELEPHONE ENCOUNTER
VM left for patient with attached recommendations per Constance Vilchis PA-C, call back number provided. Repeat labs ordered.

## 2024-04-30 ENCOUNTER — APPOINTMENT (OUTPATIENT)
Dept: LAB | Facility: MEDICAL CENTER | Age: 34
End: 2024-04-30
Payer: COMMERCIAL

## 2024-04-30 ENCOUNTER — TELEPHONE (OUTPATIENT)
Dept: HEMATOLOGY ONCOLOGY | Facility: CLINIC | Age: 34
End: 2024-04-30

## 2024-04-30 DIAGNOSIS — D68.00 VON WILLEBRAND DISEASE (HCC): ICD-10-CM

## 2024-04-30 DIAGNOSIS — D75.839 THROMBOCYTOSIS: ICD-10-CM

## 2024-04-30 DIAGNOSIS — E56.1 VITAMIN K DEFICIENCY: ICD-10-CM

## 2024-04-30 DIAGNOSIS — T73.3XXA FATIGUE DUE TO EXCESSIVE EXERTION, INITIAL ENCOUNTER: ICD-10-CM

## 2024-04-30 DIAGNOSIS — E56.0 VITAMIN E DEFICIENCY: ICD-10-CM

## 2024-04-30 DIAGNOSIS — N92.4 EXCESSIVE BLEEDING IN PREMENOPAUSAL PERIOD: ICD-10-CM

## 2024-04-30 DIAGNOSIS — R23.3 EASY BRUISING: ICD-10-CM

## 2024-04-30 DIAGNOSIS — E56.0 VITAMIN E DEFICIENCY: Primary | ICD-10-CM

## 2024-04-30 LAB
BASOPHILS # BLD AUTO: 0.04 THOUSANDS/ÂΜL (ref 0–0.1)
BASOPHILS NFR BLD AUTO: 1 % (ref 0–1)
EOSINOPHIL # BLD AUTO: 0.16 THOUSAND/ÂΜL (ref 0–0.61)
EOSINOPHIL NFR BLD AUTO: 2 % (ref 0–6)
ERYTHROCYTE [DISTWIDTH] IN BLOOD BY AUTOMATED COUNT: 12.7 % (ref 11.6–15.1)
HCT VFR BLD AUTO: 42.8 % (ref 34.8–46.1)
HGB BLD-MCNC: 14.2 G/DL (ref 11.5–15.4)
IMM GRANULOCYTES # BLD AUTO: 0.02 THOUSAND/UL (ref 0–0.2)
IMM GRANULOCYTES NFR BLD AUTO: 0 % (ref 0–2)
LYMPHOCYTES # BLD AUTO: 2.11 THOUSANDS/ÂΜL (ref 0.6–4.47)
LYMPHOCYTES NFR BLD AUTO: 29 % (ref 14–44)
MCH RBC QN AUTO: 29.6 PG (ref 26.8–34.3)
MCHC RBC AUTO-ENTMCNC: 33.2 G/DL (ref 31.4–37.4)
MCV RBC AUTO: 89 FL (ref 82–98)
MONOCYTES # BLD AUTO: 0.55 THOUSAND/ÂΜL (ref 0.17–1.22)
MONOCYTES NFR BLD AUTO: 8 % (ref 4–12)
NEUTROPHILS # BLD AUTO: 4.36 THOUSANDS/ÂΜL (ref 1.85–7.62)
NEUTS SEG NFR BLD AUTO: 60 % (ref 43–75)
NRBC BLD AUTO-RTO: 0 /100 WBCS
PLATELET # BLD AUTO: 388 THOUSANDS/UL (ref 149–390)
PMV BLD AUTO: 9.2 FL (ref 8.9–12.7)
RBC # BLD AUTO: 4.8 MILLION/UL (ref 3.81–5.12)
WBC # BLD AUTO: 7.24 THOUSAND/UL (ref 4.31–10.16)

## 2024-04-30 PROCEDURE — 84597 ASSAY OF VITAMIN K: CPT

## 2024-04-30 PROCEDURE — 84446 ASSAY OF VITAMIN E: CPT

## 2024-04-30 PROCEDURE — 36415 COLL VENOUS BLD VENIPUNCTURE: CPT

## 2024-04-30 PROCEDURE — 85025 COMPLETE CBC W/AUTO DIFF WBC: CPT

## 2024-04-30 NOTE — TELEPHONE ENCOUNTER
Patient Call    Who are you speaking with? Patient    If it is not the patient, are they listed on an active communication consent form? N/A   What is the reason for this call? Patient is wondering if the office would be ok with her going for her blood work earlier than ordered.  Patient states that she hit a volleyball last night and she has a very large bruise.   Does this require a call back? Yes   If a call back is required, please list best call back number [phone]    If a call back is required, advise that a message will be forwarded to their care team and someone will return their call as soon as possible.   Did you relay this information to the patient? Yes

## 2024-04-30 NOTE — TELEPHONE ENCOUNTER
Called and spoke with patient. She states she is bruising more than usual lately. States she was playing volleyball with her daughter last night and now has bruising senior living up her arm and on her hand.     She has a follow up appointment scheduled with Constance Vilchis on 5/16 and will go for blood work prior to then. Orders re-entered as expected date is after appointment.     Patient states prior to taking her gluten intolerance test, she did not eat much gluten. She is wondering if she were to eat more gluten prior to testing if results would have been different. Patient is questioning accuracy of results. Will reach out to Constance Vilchis and return call to patient.

## 2024-05-01 NOTE — TELEPHONE ENCOUNTER
Returned call to patient and discussed repeating celiac panel. She will repeat this prior to next appointment.

## 2024-05-01 NOTE — TELEPHONE ENCOUNTER
Low gluten prior to celiac testing might give a false negative.  We could repeat if she has been eating more gluten

## 2024-05-02 ENCOUNTER — APPOINTMENT (OUTPATIENT)
Dept: LAB | Facility: MEDICAL CENTER | Age: 34
End: 2024-05-02
Payer: COMMERCIAL

## 2024-05-02 DIAGNOSIS — E56.1 VITAMIN K DEFICIENCY: ICD-10-CM

## 2024-05-02 DIAGNOSIS — E56.0 VITAMIN E DEFICIENCY: ICD-10-CM

## 2024-05-02 DIAGNOSIS — T73.3XXA FATIGUE DUE TO EXCESSIVE EXERTION, INITIAL ENCOUNTER: ICD-10-CM

## 2024-05-02 DIAGNOSIS — N92.4 EXCESSIVE BLEEDING IN PREMENOPAUSAL PERIOD: ICD-10-CM

## 2024-05-02 DIAGNOSIS — D75.839 THROMBOCYTOSIS: ICD-10-CM

## 2024-05-02 DIAGNOSIS — R23.3 EASY BRUISING: ICD-10-CM

## 2024-05-02 DIAGNOSIS — D68.00 VON WILLEBRAND DISEASE (HCC): ICD-10-CM

## 2024-05-02 LAB — IGA SERPL-MCNC: 127 MG/DL (ref 66–433)

## 2024-05-02 PROCEDURE — 86258 DGP ANTIBODY EACH IG CLASS: CPT

## 2024-05-02 PROCEDURE — 36415 COLL VENOUS BLD VENIPUNCTURE: CPT

## 2024-05-02 PROCEDURE — 82784 ASSAY IGA/IGD/IGG/IGM EACH: CPT

## 2024-05-02 PROCEDURE — 86364 TISS TRNSGLTMNASE EA IG CLAS: CPT

## 2024-05-03 LAB
GLIADIN PEPTIDE IGA SER-ACNC: 15.7 U/ML
GLIADIN PEPTIDE IGA SER-ACNC: POSITIVE
GLIADIN PEPTIDE IGG SER-ACNC: <0.4 U/ML
GLIADIN PEPTIDE IGG SER-ACNC: NEGATIVE
TTG IGA SER-ACNC: <0.5 U/ML
TTG IGA SER-ACNC: NEGATIVE
TTG IGG SER-ACNC: <0.8 U/ML
TTG IGG SER-ACNC: NEGATIVE

## 2024-05-05 LAB
A-TOCOPHEROL VIT E SERPL-MCNC: 10.3 MG/L (ref 5.9–19.4)
GAMMA TOCOPHEROL SERPL-MCNC: 0.3 MG/L (ref 0.7–4.9)
PHYTONADIONE SERPL-MCNC: 0.28 NG/ML (ref 0.1–2.2)

## 2024-05-16 ENCOUNTER — TELEMEDICINE (OUTPATIENT)
Dept: HEMATOLOGY ONCOLOGY | Facility: CLINIC | Age: 34
End: 2024-05-16
Payer: COMMERCIAL

## 2024-05-16 ENCOUNTER — TELEPHONE (OUTPATIENT)
Dept: HEMATOLOGY ONCOLOGY | Facility: CLINIC | Age: 34
End: 2024-05-16

## 2024-05-16 DIAGNOSIS — E56.9 VITAMIN DEFICIENCY: ICD-10-CM

## 2024-05-16 DIAGNOSIS — K90.0 CELIAC DISEASE: ICD-10-CM

## 2024-05-16 DIAGNOSIS — R23.3 EASY BRUISABILITY: Primary | ICD-10-CM

## 2024-05-16 DIAGNOSIS — E56.0 VITAMIN E DEFICIENCY: ICD-10-CM

## 2024-05-16 DIAGNOSIS — E56.1 VITAMIN K DEFICIENCY: ICD-10-CM

## 2024-05-16 PROCEDURE — 99213 OFFICE O/P EST LOW 20 MIN: CPT | Performed by: PHYSICIAN ASSISTANT

## 2024-05-16 NOTE — PROGRESS NOTES
Virtual Regular Visit    05/16/24         Patient: Jessica Alejandro    Provider: Constance Vilchis PA-C  Provider located at [facility] [address] [place] [address] [phone]      A/P:  Easy bruising - 3/2023 Normal von willebrand testing,PT, PTT/ INR, B12, iron panel, flow cytometry       Fatigue    Additional workup identified Vitamin K and Vitamin E deficiency.  She is taking oral supplementation.    + celiac panel 4/2024                 Mild thrombocytosis 462 on 11/2023 CBCD assessment - resolved.       Heavy menses. F/U with gynecology.           HPI: Jessica Alejandro is a 34 y.o. seen for initial consultation 4/2023 regarding easy bruising.     She denies significant history of epistaxis, spontaneous hemarthrosis, did not bleed excessively after 3 vaginal deliveries.     Regarding menses she spots first day and has 2 to 3 days of heavy flow without clotting wears super plus tampons and changes them every hour and a half, does occasionally bleed through the night with super tampon and pad, then has spotting after and periods are regular. Only surgical hx is mole excision and she does not report excess bleeding with this procedure.      Patient states that she is always tired no matter how much she sleeps, affecting her ADLs, she questions having lymph node that is occasionally enlarged and painful underneath her left axilla, also reports night sweats several times a week over a 1 to 2-month. Has lost about 10lbs per chart review since October 2022.     3/2023 normal factor VIII activity von Willebrand factor, von Willebrand antigen     4/2023 ferritin 59, iron saturation 42%, normal PT/INR, PTT, flow cytometric analysis does not show significant numbers of circulating blasts or abnormal lymphoid or myeloid population.  No evidence of folate deficiency.     At her May 2023 f/u, she reported constitutional symptoms of night sweats,  "axillary adenopathy, family hx of lymphoma.      5/2023 CT C/A/P ordered - performed at [facility] 5/1/23-no enlargement of lymph nodes, 2 mm lung nodule noted. Patient is a never smoker. \" According to the Fleischner criteria, no follow-up is needed.\"        She was referred to gynecology for heavy bleeding, clotting with menses   She reported lump previously on right side of breast and engorgement which improved also had redness at that time which improved. Now complaints of left breast lump.   Diagnostic mammogram 6/2023 - ASSESSMENT/BI-RADS CATEGORY:  Left: 2 - Benign  Right: 1 - Negative  Overall: 2 - Benign  Clinical management for the left breast and night sweats.  - Routine screening mammogram in 1 year for both breasts     8/31/2023 breast MRI  ASSESSMENT/BI-RADS CATEGORY:  Left: 1 - Negative  Right: 1 - Negative  Overall: 1 - Negative     RECOMMENDATION:  - Clinical management for the left breast.  - Routine screening mammogram at age 39 for both breasts.     30 lb weight loss. Dietary modifications     Interval History: Mild thrombocytosis 462 on 11/2023 CBCD assessment.    11/2023  Vitamin E was low  Vitamin E daily - 200 units recommended.      Vit K also low.   Diet rich in in leafy greens and cruciferous vegetables( such as broccoli) recommended.     OTC vitamin K also recommended. 1 tablet daily.                    4/30/24 CBCD normal.    4/30/24 Celiac panel when she was eating gluten was Positive        Interval History  Still bruising.  Feels better off gluten.      Review of Systems   Constitutional:  Positive for fatigue.   Gastrointestinal:  Positive for abdominal distention.   Hematological:  Bruises/bleeds easily.   All other systems reviewed and are negative.       Physical Exam  Constitutional:       Appearance: She is well-developed.   HENT:      Head: Normocephalic and atraumatic.   Pulmonary:      Effort: Pulmonary effort is normal. No respiratory distress. "   Neurological:      Mental Status: She is alert.   Psychiatric:         Behavior: Behavior normal.          Past Medical History:  No date: Anemia      Comment:  no meds   12/27/21: COVID-19 affecting pregnancy in second trimester  No date: Gestational diabetes      Comment:  gestational last pregnancy and prediabetes 2018-                Resolved with 40 lb weight loss  5/17/2021: Gestational diabetes mellitus (GDM) in third trimester  No date: IBS (irritable bowel syndrome)  No date: Migraine      Comment:  occular migranes no meds   No date: Numerous skin moles      Comment:  pre cancerous moles removed   No date: Urinary tract infection  No date: Varicella      Comment:  had a child      Past Surgical History:  01/01/2012: COLONOSCOPY      Comment:  negative  No date: MOLE REMOVAL  No date: SKIN TAG REMOVAL      Comment:  removal of pre-cancerous moles x 2     Current Outpatient Medications on File Prior to Visit   Medication Sig Dispense Refill    escitalopram (LEXAPRO) 10 mg tablet Take 1 tablet (10 mg total) by mouth daily 60 tablet 1    Galcanezumab-gnlm (Emgality) 120 MG/ML SOAJ Inject 120 mg under the skin every 30 (thirty) days --First administration, inject 1 dose into each thigh. Every 30 days following, inject 1 dose into 1 thigh. (Patient not taking: Reported on 8/21/2023) 4 mL 3    ibuprofen (MOTRIN) 600 mg tablet Take 1 tablet (600 mg total) by mouth every 6 (six) hours as needed for moderate pain (cramping) 30 tablet 0    multivitamin (THERAGRAN) TABS Take 1 tablet by mouth daily      nadolol (CORGARD) 40 mg tablet Take 1 tablet (40 mg total) by mouth daily 60 tablet 1     No current facility-administered medications on file prior to visit.          The patient was identified by name and date of birth. Jessica Alejandro was informed that this is a telemedicine visit and that the visit is being conducted through the Epic Embedded platform. She agrees to proceed..  My office door was closed. No one else  was in the room.  She acknowledged consent and understanding of privacy and security of the video platform. The patient has agreed to participate and understands they can discontinue the visit at any time.    Patient is aware this is a billable service.     I invested 15 minutes thoroughly reviewing the patient's medical history and discussing the care plan directly with the patient.      Verification of patient location:  Patient is located in [place] where I have an active license.

## 2024-05-16 NOTE — TELEPHONE ENCOUNTER
Called pt after Virtual appt to schedule 6 month fu.It is 11/20 at 9 am with labs before.Gave Hopeline to reschedule

## 2024-06-30 NOTE — PROGRESS NOTES
"S: 34 y.o.  who presents for viability scan with LMP of 24. She is 8 weeks and 3 days by her LMP. She reports nausea. She denies cramping or vaginal bleeding.     Past Medical History:   Diagnosis Date   • Anemia     no meds    • Celiac disease    • COVID-19 affecting pregnancy in second trimester 21   • Gestational diabetes     gestational last pregnancy and prediabetes - Resolved with 40 lb weight loss   • Gestational diabetes mellitus (GDM) in third trimester 2021   • IBS (irritable bowel syndrome)    • Migraine     occular migranes no meds    • Numerous skin moles     pre cancerous moles removed    • Urinary tract infection    • Varicella     had a child        OB History    Para Term  AB Living   6 3 3   2 3   SAB IAB Ectopic Multiple Live Births   2 0 0   3      # Outcome Date GA Lbr Reji/2nd Weight Sex Type Anes PTL Lv   6 Current            5 Term 21 38w2d / 00:08 3595 g (7 lb 14.8 oz) F Vag-Spont Local N TREVOR   4 Term 05/16/15 38w0d  3714 g (8 lb 3 oz) F Vag-Spont None N TREVOR      Birth Comments: pre gestational    3 SAB  6w0d    SAB      2 SAB  6w0d          1 Term 11 39w0d  3487 g (7 lb 11 oz) F Vag-Spont EPI N TREVOR      Complications: History of episiotomy        O:  Vitals:    24 0950   BP: 110/72   BP Location: Left arm   Patient Position: Sitting   Cuff Size: Standard   Weight: 73.9 kg (163 lb)   Height: 5' 5\" (1.651 m)          TVUS: viable, ryan  IUP at 8 weeks 2 days with CRL 18. FHT 179bpm. KOFFI 24 by LMP and US. Final dating via LMP.          A/P:  #1. IUP at 8 weeks and 3 days  - Viable pregnancy on TVUS  - RTC in 1-2 week for nurse intake visit  -we reviewed the use of OTC Vitamin B6 (50 mg BID) as well as addition of Unisom at night to help alleviate sx. If no resolve can consider addition of Zofran.     Problem List Items Addressed This Visit    None  Visit Diagnoses     Amenorrhea    -  Primary    First trimester " pregnancy        Relevant Orders    AMB US OB < 14 weeks single or first gestation level 1 (Completed)    Ambulatory Referral to Maternal Fetal Medicine          Leah Lorenzana PA-C  OB/GYN  7/3/2024  10:33 AM

## 2024-07-03 ENCOUNTER — ULTRASOUND (OUTPATIENT)
Dept: OBGYN CLINIC | Facility: CLINIC | Age: 34
End: 2024-07-03
Payer: COMMERCIAL

## 2024-07-03 VITALS
SYSTOLIC BLOOD PRESSURE: 110 MMHG | BODY MASS INDEX: 27.16 KG/M2 | HEIGHT: 65 IN | WEIGHT: 163 LBS | DIASTOLIC BLOOD PRESSURE: 72 MMHG

## 2024-07-03 DIAGNOSIS — Z34.91 FIRST TRIMESTER PREGNANCY: ICD-10-CM

## 2024-07-03 DIAGNOSIS — N91.2 AMENORRHEA: Primary | ICD-10-CM

## 2024-07-03 PROCEDURE — 76817 TRANSVAGINAL US OBSTETRIC: CPT | Performed by: PHYSICIAN ASSISTANT

## 2024-07-03 PROCEDURE — 99214 OFFICE O/P EST MOD 30 MIN: CPT | Performed by: PHYSICIAN ASSISTANT

## 2024-07-15 NOTE — PATIENT INSTRUCTIONS
Congratulations!! Please review our Pregnancy Essential Guide and [facility] L&D Virtual tour from our networks website.     [facility] Pregnancy Essentials Guide  [facility] ([email or web address])     [facility] [place] - Virtual Tour ([facility])

## 2024-07-18 ENCOUNTER — INITIAL PRENATAL (OUTPATIENT)
Dept: OBGYN CLINIC | Facility: CLINIC | Age: 34
End: 2024-07-18

## 2024-07-18 ENCOUNTER — NURSE TRIAGE (OUTPATIENT)
Age: 34
End: 2024-07-18

## 2024-07-18 VITALS
SYSTOLIC BLOOD PRESSURE: 122 MMHG | DIASTOLIC BLOOD PRESSURE: 62 MMHG | WEIGHT: 163.4 LBS | HEIGHT: 65 IN | BODY MASS INDEX: 27.22 KG/M2

## 2024-07-18 DIAGNOSIS — Z34.81 ENCOUNTER FOR SUPERVISION OF OTHER NORMAL PREGNANCY, FIRST TRIMESTER: Primary | ICD-10-CM

## 2024-07-18 PROCEDURE — OBC

## 2024-07-18 NOTE — PROGRESS NOTES
Details that I feel the provider should be aware of:   - h/o  x3  - H/o SAB x2 (, )  - prior pregnancies complicated by pregestational diabetes and then GDM ()  - recent diagnosis of Celiac Disease (2024)  - last pap 10/26/22, denies h/o abn pap  - AMA    OB INTAKE INTERVIEW  Patient is 34 y.o. who presents for OB intake at 10w4d  She is accompanied by her daughter during this encounter  The father of her baby (Julian) is supportive and involved in the pregnancy and is 35 years old.      Last Menstrual Period: 24  Ultrasound: Measured 8 weeks 2 days on 7/3/24  Estimated Date of Delivery: 25 by LMP confirmed by 8 week US    Signs/Symptoms of Pregnancy  Current pregnancy symptoms: nausea, improving. Reviewed tips and OTC recommendations and when to call office.  Constipation no  Headaches no  Cramping/spotting no  PICA cravings no    Diabetes-  BMI 27.19kg/m  If patient has 1 or more, please order early 1 hour GTT  History of GDM YES  BMI >35 no  History of PCOS or current metformin use no  History of LGA/macrosomic infant (4000g/9lbs) no      Hypertension- if you answer yes to any of the following, please order baseline preeclampsia labs (cbc, comprehensive metabolic panel, urine protein creatinine ratio, uric acid)  History of of chronic HTN no  History of gestational HTN no  History of preeclampsia, eclampsia, or HELLP syndrome no  History of diabetes no  History of lupus, autoimmune disease, kidney disease YES - recent new diagnosis (2024) of celiac disease    Thyroid- if yes order TSH with reflex T4  History of thyroid disease no    Bleeding Disorder or Hx of DVT-patient or first degree relative with history of. Order the following if not done previously.   (Factor V, antithrombin III, prothrombin gene mutation, protein C and S Ag, lupus anticoagulant, anticardiolipin, beta-2 glycoprotein)   no  Patient reports easy and extensive bruising, night sweats, has established  with and been following with hematology for symptoms. Completed work up, low vitamin K, with new diagnosis of celiac disease and removing gluten from diet has had improving with labs as ordered and followed by hematology.     OB/GYN-  History of abnormal pap smear no       Date of last pap smear 10/26/22  History of HPV no  History of Herpes/HSV no  History of other STI (gonorrhea, chlamydia, trich) no  History of prior  YES  History of prior  no  History of  delivery prior to 36 weeks 6 days no  History of blood transfusion no  Ok for blood transfusion yes    Substance screening-   History of tobacco use no  Currently using tobacco no  Substance Use Screen Level (N/A, LOW, HIGH) no risk    MRSA Screening-   Does the pt have a hx of MRSA? no    Immunizations:  Influenza vaccine given this season n/a - reviewed  Discussed Tdap vaccine yes  Discussed COVID Vaccine yes    Genetic/[facility]-  Do you or your partner have a history of any of the following in yourselves or first degree relatives?  Cystic fibrosis no  Spinal muscular atrophy no  Hemoglobinopathy/Sickle Cell/Thalassemia no  Fragile X Intellectual Disability no    Previously completed (neg) CF and SMA 2020      Appointment for Nuchal Translucency Ultrasound at [facility] scheduled for 24      Interview education  [facility] Pregnancy Essentials Book reviewed, discussed and attached to their AVS yes    Nurse/Family Partnership- patient may qualify no; referral placed no    Prenatal lab work scripts yes  Extra labs ordered:  1 hr glucose, preeclampsia baseline labs     Aspirin/Preeclampsia Screen    Risk Level Risk Factor Recommendation   LOW Prior Uncomplicated full-term delivery YES No Aspirin recommendation        MODERATE Nulliparity no Recommend low-dose aspirin if     BMI>30 no 2 or more moderate risk factors    Family History Preeclampsia (mother/sister) no     35yr old or greater YES     Black Race, Concern for SDOH/Low Socioeconomic no      IVF Pregnancy  no     Personal History Risks (low birth weight, prior adverse preg outcome, >10yr preg interval) YES - h/o sab x2         HIGH History of Preeclampsia no Recommend low-dose aspirin if     Multifetal gestation no 1 or more high risk factors    Chronic HTN no     Type 1 or 2 Diabetes no     Renal Disease no     Autoimmune Disease  YES - celiac disease      Contraindications to ASA therapy:  NSAID/ ASA allergy: no  Nasal polyps: no  Asthma with history of ASA induced bronchospasm: no  Relative contraindications:  History of GI bleed: no  Active peptic ulcer disease: no  Severe hepatic dysfunction: no    Patient should be recommended to take ASA 162mg during this pregnancy from 12-36wks to lower her risk of preeclampsia: reviewed risk factors and ASA therapy recommendations. Patient verbalized understanding and will further review with MFM at upcoming scheduled appt.      The patient has a history now or in prior pregnancy notable for:  gestational DM and AMA, h/o SABx2, Celiac Disease        PN1 visit scheduled. The patient was oriented to our practice, the navigator role, reviewed delivering physicians and [facility] for Delivery. All questions were answered.    Interviewed by: LENCHO Wakefield RN

## 2024-07-18 NOTE — TELEPHONE ENCOUNTER
Regarding: [facility] blood inquiry  ----- Message from Babita BOUCHER sent at 7/18/2024  1:02 PM EDT -----  Patient was calling [facility] because she is scheduled to go into [facility] lab for some blood work   she knows that she needs to do the Nips testing and inquring if an order can be put in so she gets that done at the same time while she is in the lab if applicable.  If she patient does instead need to go into office, that is fine as well.  Just needing someone to reach out to her in order to do that.  She is trying to be protactive in her care.

## 2024-07-18 NOTE — TELEPHONE ENCOUNTER
Spoke to patient on the phone- discussed test can only be ordered at NT ultrasound appt or an appt with genetic counselor. She has an NT appt on 8/1

## 2024-07-27 ENCOUNTER — APPOINTMENT (OUTPATIENT)
Dept: LAB | Facility: CLINIC | Age: 34
End: 2024-07-27
Payer: COMMERCIAL

## 2024-07-27 DIAGNOSIS — Z34.81 ENCOUNTER FOR SUPERVISION OF OTHER NORMAL PREGNANCY, FIRST TRIMESTER: ICD-10-CM

## 2024-07-27 LAB
ABO GROUP BLD: NORMAL
ALBUMIN SERPL BCG-MCNC: 3.5 G/DL (ref 3.5–5)
ALP SERPL-CCNC: 56 U/L (ref 34–104)
ALT SERPL W P-5'-P-CCNC: 7 U/L (ref 7–52)
AMORPH URATE CRY URNS QL MICRO: NORMAL
ANION GAP SERPL CALCULATED.3IONS-SCNC: 5 MMOL/L (ref 4–13)
AST SERPL W P-5'-P-CCNC: 10 U/L (ref 13–39)
BACTERIA UR QL AUTO: NORMAL /HPF
BASOPHILS # BLD AUTO: 0.03 THOUSANDS/ÂΜL (ref 0–0.1)
BASOPHILS NFR BLD AUTO: 0 % (ref 0–1)
BILIRUB SERPL-MCNC: 0.53 MG/DL (ref 0.2–1)
BILIRUB UR QL STRIP: NEGATIVE
BLD GP AB SCN SERPL QL: NEGATIVE
BUN SERPL-MCNC: 10 MG/DL (ref 5–25)
CALCIUM SERPL-MCNC: 8.5 MG/DL (ref 8.4–10.2)
CHLORIDE SERPL-SCNC: 105 MMOL/L (ref 96–108)
CLARITY UR: CLEAR
CO2 SERPL-SCNC: 25 MMOL/L (ref 21–32)
COLOR UR: COLORLESS
CREAT SERPL-MCNC: 0.61 MG/DL (ref 0.6–1.3)
CREAT UR-MCNC: 30.4 MG/DL
EOSINOPHIL # BLD AUTO: 0.11 THOUSAND/ÂΜL (ref 0–0.61)
EOSINOPHIL NFR BLD AUTO: 1 % (ref 0–6)
ERYTHROCYTE [DISTWIDTH] IN BLOOD BY AUTOMATED COUNT: 12.8 % (ref 11.6–15.1)
GFR SERPL CREATININE-BSD FRML MDRD: 117 ML/MIN/1.73SQ M
GLUCOSE 1H P 50 G GLC PO SERPL-MCNC: 132 MG/DL (ref 70–134)
GLUCOSE P FAST SERPL-MCNC: 90 MG/DL (ref 65–99)
GLUCOSE UR STRIP-MCNC: NEGATIVE MG/DL
HCT VFR BLD AUTO: 36.4 % (ref 34.8–46.1)
HGB BLD-MCNC: 12.2 G/DL (ref 11.5–15.4)
HGB UR QL STRIP.AUTO: NEGATIVE
IMM GRANULOCYTES # BLD AUTO: 0.04 THOUSAND/UL (ref 0–0.2)
IMM GRANULOCYTES NFR BLD AUTO: 1 % (ref 0–2)
KETONES UR STRIP-MCNC: NEGATIVE MG/DL
LEUKOCYTE ESTERASE UR QL STRIP: NEGATIVE
LYMPHOCYTES # BLD AUTO: 1.44 THOUSANDS/ÂΜL (ref 0.6–4.47)
LYMPHOCYTES NFR BLD AUTO: 19 % (ref 14–44)
MCH RBC QN AUTO: 29.5 PG (ref 26.8–34.3)
MCHC RBC AUTO-ENTMCNC: 33.5 G/DL (ref 31.4–37.4)
MCV RBC AUTO: 88 FL (ref 82–98)
MONOCYTES # BLD AUTO: 0.4 THOUSAND/ÂΜL (ref 0.17–1.22)
MONOCYTES NFR BLD AUTO: 5 % (ref 4–12)
NEUTROPHILS # BLD AUTO: 5.69 THOUSANDS/ÂΜL (ref 1.85–7.62)
NEUTS SEG NFR BLD AUTO: 74 % (ref 43–75)
NITRITE UR QL STRIP: NEGATIVE
NON-SQ EPI CELLS URNS QL MICRO: NORMAL /HPF
NRBC BLD AUTO-RTO: 0 /100 WBCS
PH UR STRIP.AUTO: 8 [PH]
PLATELET # BLD AUTO: 335 THOUSANDS/UL (ref 149–390)
PMV BLD AUTO: 8.9 FL (ref 8.9–12.7)
POTASSIUM SERPL-SCNC: 3.8 MMOL/L (ref 3.5–5.3)
PROT SERPL-MCNC: 5.9 G/DL (ref 6.4–8.4)
PROT UR STRIP-MCNC: NEGATIVE MG/DL
PROT UR-MCNC: 4 MG/DL
PROT/CREAT UR: 0.13 MG/G{CREAT} (ref 0–0.1)
RBC # BLD AUTO: 4.13 MILLION/UL (ref 3.81–5.12)
RBC #/AREA URNS AUTO: NORMAL /HPF
RH BLD: POSITIVE
RUBV IGG SERPL IA-ACNC: 43.6 IU/ML
SODIUM SERPL-SCNC: 135 MMOL/L (ref 135–147)
SP GR UR STRIP.AUTO: 1.01 (ref 1–1.03)
SPECIMEN EXPIRATION DATE: NORMAL
URATE SERPL-MCNC: 3.5 MG/DL (ref 2–7.5)
UROBILINOGEN UR STRIP-ACNC: <2 MG/DL
WBC # BLD AUTO: 7.71 THOUSAND/UL (ref 4.31–10.16)
WBC #/AREA URNS AUTO: NORMAL /HPF

## 2024-07-27 PROCEDURE — 82570 ASSAY OF URINE CREATININE: CPT

## 2024-07-27 PROCEDURE — 86803 HEPATITIS C AB TEST: CPT

## 2024-07-27 PROCEDURE — 86900 BLOOD TYPING SEROLOGIC ABO: CPT

## 2024-07-27 PROCEDURE — 86762 RUBELLA ANTIBODY: CPT

## 2024-07-27 PROCEDURE — 81001 URINALYSIS AUTO W/SCOPE: CPT

## 2024-07-27 PROCEDURE — 87340 HEPATITIS B SURFACE AG IA: CPT

## 2024-07-27 PROCEDURE — 86901 BLOOD TYPING SEROLOGIC RH(D): CPT

## 2024-07-27 PROCEDURE — 36415 COLL VENOUS BLD VENIPUNCTURE: CPT

## 2024-07-27 PROCEDURE — 82950 GLUCOSE TEST: CPT

## 2024-07-27 PROCEDURE — 84550 ASSAY OF BLOOD/URIC ACID: CPT

## 2024-07-27 PROCEDURE — 84156 ASSAY OF PROTEIN URINE: CPT

## 2024-07-27 PROCEDURE — 80053 COMPREHEN METABOLIC PANEL: CPT

## 2024-07-27 PROCEDURE — 87389 HIV-1 AG W/HIV-1&-2 AB AG IA: CPT

## 2024-07-27 PROCEDURE — 87086 URINE CULTURE/COLONY COUNT: CPT

## 2024-07-27 PROCEDURE — 83020 HEMOGLOBIN ELECTROPHORESIS: CPT

## 2024-07-27 PROCEDURE — 86780 TREPONEMA PALLIDUM: CPT

## 2024-07-27 PROCEDURE — 86850 RBC ANTIBODY SCREEN: CPT

## 2024-07-27 PROCEDURE — 85025 COMPLETE CBC W/AUTO DIFF WBC: CPT

## 2024-07-28 LAB
BACTERIA UR CULT: NORMAL
HBV SURFACE AG SER QL: NORMAL
HCV AB SER QL: NORMAL
HIV 1+2 AB+HIV1 P24 AG SERPL QL IA: NORMAL
HIV 2 AB SERPL QL IA: NORMAL
HIV1 AB SERPL QL IA: NORMAL
HIV1 P24 AG SERPL QL IA: NORMAL
TREPONEMA PALLIDUM IGG+IGM AB [PRESENCE] IN SERUM OR PLASMA BY IMMUNOASSAY: NORMAL

## 2024-07-31 NOTE — PROGRESS NOTES
VISIT 34 yr old  at 12w4d: Denies n/v/HA/cramping/vb/lof/edema/dv/smoking; Feeling significantly better; urine neg/neg  Labs up to date; PNC - level 1 later today;  PNVs + DHA - tolerating daily  No FM yet    Physical exam done; Pap up to date 10/26/22 - WNL (-) HRHPV type 16/18 neg; C/G cultures collected today  Encouraged hydration.   RTO in 4 weeks for routine ob check or sooner if needed

## 2024-08-01 ENCOUNTER — ROUTINE PRENATAL (OUTPATIENT)
Facility: HOSPITAL | Age: 34
End: 2024-08-01
Payer: COMMERCIAL

## 2024-08-01 ENCOUNTER — INITIAL PRENATAL (OUTPATIENT)
Dept: OBGYN CLINIC | Facility: CLINIC | Age: 34
End: 2024-08-01

## 2024-08-01 VITALS
WEIGHT: 166.6 LBS | HEIGHT: 65 IN | HEART RATE: 92 BPM | DIASTOLIC BLOOD PRESSURE: 58 MMHG | SYSTOLIC BLOOD PRESSURE: 108 MMHG | BODY MASS INDEX: 27.76 KG/M2

## 2024-08-01 VITALS
WEIGHT: 165 LBS | SYSTOLIC BLOOD PRESSURE: 102 MMHG | HEIGHT: 65 IN | DIASTOLIC BLOOD PRESSURE: 64 MMHG | BODY MASS INDEX: 27.49 KG/M2

## 2024-08-01 DIAGNOSIS — Z3A.12 12 WEEKS GESTATION OF PREGNANCY: Primary | ICD-10-CM

## 2024-08-01 DIAGNOSIS — O09.299 H/O GESTATIONAL DIABETES IN PRIOR PREGNANCY, CURRENTLY PREGNANT: ICD-10-CM

## 2024-08-01 DIAGNOSIS — Z3A.12 12 WEEKS GESTATION OF PREGNANCY: ICD-10-CM

## 2024-08-01 DIAGNOSIS — Z86.32 H/O GESTATIONAL DIABETES IN PRIOR PREGNANCY, CURRENTLY PREGNANT: ICD-10-CM

## 2024-08-01 DIAGNOSIS — O09.521 AMA (ADVANCED MATERNAL AGE) MULTIGRAVIDA 35+, FIRST TRIMESTER: ICD-10-CM

## 2024-08-01 DIAGNOSIS — Z36.82 ENCOUNTER FOR ANTENATAL SCREENING FOR NUCHAL TRANSLUCENCY: ICD-10-CM

## 2024-08-01 DIAGNOSIS — O09.521 SUPERVISION OF ELDERLY MULTIGRAVIDA IN FIRST TRIMESTER: Primary | ICD-10-CM

## 2024-08-01 DIAGNOSIS — Z34.91 FIRST TRIMESTER PREGNANCY: ICD-10-CM

## 2024-08-01 PROBLEM — O24.419 GESTATIONAL DIABETES MELLITUS (GDM) IN THIRD TRIMESTER: Status: RESOLVED | Noted: 2021-05-17 | Resolved: 2024-08-01

## 2024-08-01 PROBLEM — U07.1 COVID-19 AFFECTING PREGNANCY IN SECOND TRIMESTER: Status: RESOLVED | Noted: 2021-01-19 | Resolved: 2024-08-01

## 2024-08-01 PROBLEM — O98.512 COVID-19 AFFECTING PREGNANCY IN SECOND TRIMESTER: Status: RESOLVED | Noted: 2021-01-19 | Resolved: 2024-08-01

## 2024-08-01 PROBLEM — O99.210 OBESITY AFFECTING PREGNANCY: Status: RESOLVED | Noted: 2021-05-20 | Resolved: 2024-08-01

## 2024-08-01 PROCEDURE — 87591 N.GONORRHOEAE DNA AMP PROB: CPT | Performed by: PHYSICIAN ASSISTANT

## 2024-08-01 PROCEDURE — 76813 OB US NUCHAL MEAS 1 GEST: CPT | Performed by: OBSTETRICS & GYNECOLOGY

## 2024-08-01 PROCEDURE — 99203 OFFICE O/P NEW LOW 30 MIN: CPT | Performed by: OBSTETRICS & GYNECOLOGY

## 2024-08-01 PROCEDURE — PNV: Performed by: PHYSICIAN ASSISTANT

## 2024-08-01 PROCEDURE — 36415 COLL VENOUS BLD VENIPUNCTURE: CPT | Performed by: OBSTETRICS & GYNECOLOGY

## 2024-08-01 PROCEDURE — 87491 CHLMYD TRACH DNA AMP PROBE: CPT | Performed by: PHYSICIAN ASSISTANT

## 2024-08-01 NOTE — PROGRESS NOTES
Patient chose to have [facility] [id number] Non-Invasive Prenatal Screen [id number] PLUS w/ SCA, WITH fetal sex.  Patient choose to be billed through insurance.     Patient given brochure and is aware [facility] will contact patient's insurance and coordinate coverage.  Provided [facility] contact information. General inquiries [phone], Cost estimates [phone] and [facility] Billing [phone]. [email or web address]     Blood collection tubes labeled with patient identifiers (name, medical record number, and date of birth).     Filled out [facility] order form. Patient chose to have blood drawn in our office at time of visit. NIPS was drawn from right arm with a butterfly needle by KINGSTON Godwin RN. .      If patient chose to have blood work drawn at a [facility] lab we requested patient notify MFM (via phone call or Keek message) when blood collected so office can follow up on results.       Maternal Fetal Medicine will have results in approximately 5-7 business days and will call patient or notify via Keek.  Patient aware viewing lab result online will reveal fetal sex if ordered.    Patient verbalized understanding of all instructions and no questions at this time.

## 2024-08-01 NOTE — LETTER
"   Date: 2024    Leah Lorenzana PA-C  [address] [place] [address]    Patient: Jessica Alejandro   YOB: 1990   Date of Visit: 2024   Gestational age 12w5d   Nature of this communication: Routine       This patient was seen recently in our  office.  Please see ultrasound report under \"OB Procedures\" tab.  Please don't hesitate to contact our office with any concerns or questions.      Sincerely,      Audrey Dinero MD  Attending Physician, Maternal-Fetal Medicine  [facility]      "

## 2024-08-02 LAB
C TRACH DNA SPEC QL NAA+PROBE: NEGATIVE
HGB A MFR BLD: 2.6 % (ref 1.8–3.2)
HGB A MFR BLD: 97.4 % (ref 96.4–98.8)
HGB F MFR BLD: 0 % (ref 0–2)
HGB FRACT BLD-IMP: NORMAL
HGB S MFR BLD: 0 %
N GONORRHOEA DNA SPEC QL NAA+PROBE: NEGATIVE

## 2024-08-02 NOTE — PROGRESS NOTES
"[facility]: Ms. Alejandro was seen today for nuchal translucency ultrasound.  See ultrasound report under \"OB Procedures\" tab.      MDM:   I. Diagnoses/Problems addressed:  Self limited or minor problem: uncomplicated pregnancy  II.  Data: I ordered the following tests: NIPS.  III.  Risk of morbidity: Moderate    Please don't hesitate to contact our office with any concerns or questions.  -Audrey Dinero MD      "

## 2024-08-05 LAB
CFDNA.FET/CFDNA.TOTAL SFR FETUS: NORMAL %
CITATION REF LAB TEST: NORMAL
FET 13+18+21+X+Y ANEUP PLAS.CFDNA: NEGATIVE
FET CHR 21 TS PLAS.CFDNA QL: NEGATIVE
FET CHR 21 TS PLAS.CFDNA QL: NEGATIVE
FET MS X RISK WBC.DNA+CFDNA QL: NOT DETECTED
FET SEX PLAS.CFDNA DOSAGE CFDNA: NORMAL
FET TS 13 RISK PLAS.CFDNA QL: NEGATIVE
FET X + Y ANEUP RISK PLAS.CFDNA SEQ-IMP: NOT DETECTED
GA EST FROM CONCEPTION DATE: NORMAL D
GESTATIONAL AGE > 9:: YES
LAB DIRECTOR NAME PROVIDER: NORMAL
LAB DIRECTOR NAME PROVIDER: NORMAL
LABORATORY COMMENT REPORT: NORMAL
LIMITATIONS OF THE TEST: NORMAL
NEGATIVE PREDICTIVE VALUE: NORMAL
PERFORMANCE CHARACTERISTICS: NORMAL
POSITIVE PREDICTIVE VALUE: NORMAL
REF LAB TEST METHOD: NORMAL
SL AMB NOTE:: NORMAL
TEST PERFORMANCE INFO SPEC: NORMAL

## 2024-08-17 ENCOUNTER — NURSE TRIAGE (OUTPATIENT)
Dept: OTHER | Facility: OTHER | Age: 34
End: 2024-08-17

## 2024-08-17 ENCOUNTER — HOSPITAL ENCOUNTER (EMERGENCY)
Facility: HOSPITAL | Age: 34
Discharge: HOME | End: 2024-08-17
Attending: EMERGENCY MEDICINE | Admitting: EMERGENCY MEDICINE
Payer: COMMERCIAL

## 2024-08-17 VITALS
OXYGEN SATURATION: 100 % | HEART RATE: 74 BPM | RESPIRATION RATE: 18 BRPM | TEMPERATURE: 98 F | DIASTOLIC BLOOD PRESSURE: 62 MMHG | SYSTOLIC BLOOD PRESSURE: 116 MMHG

## 2024-08-17 DIAGNOSIS — R51.9 RECURRENT HEADACHE: Primary | ICD-10-CM

## 2024-08-17 LAB
ALBUMIN SERPL BCG-MCNC: 3.5 G/DL (ref 3.5–5)
ALP SERPL-CCNC: 55 U/L (ref 34–104)
ALT SERPL W P-5'-P-CCNC: 10 U/L (ref 7–52)
ANION GAP SERPL CALCULATED.3IONS-SCNC: 7 MMOL/L (ref 4–13)
AST SERPL W P-5'-P-CCNC: 14 U/L (ref 13–39)
BASOPHILS # BLD AUTO: 0.03 THOUSANDS/ÂΜL (ref 0–0.1)
BASOPHILS NFR BLD AUTO: 0 % (ref 0–1)
BILIRUB SERPL-MCNC: 0.39 MG/DL (ref 0.2–1)
BILIRUB UR QL STRIP: NEGATIVE
BUN SERPL-MCNC: 12 MG/DL (ref 5–25)
CALCIUM SERPL-MCNC: 8.1 MG/DL (ref 8.4–10.2)
CHLORIDE SERPL-SCNC: 106 MMOL/L (ref 96–108)
CLARITY UR: CLEAR
CO2 SERPL-SCNC: 23 MMOL/L (ref 21–32)
COLOR UR: ABNORMAL
CREAT SERPL-MCNC: 0.57 MG/DL (ref 0.6–1.3)
EOSINOPHIL # BLD AUTO: 0.08 THOUSAND/ÂΜL (ref 0–0.61)
EOSINOPHIL NFR BLD AUTO: 1 % (ref 0–6)
ERYTHROCYTE [DISTWIDTH] IN BLOOD BY AUTOMATED COUNT: 12.9 % (ref 11.6–15.1)
GFR SERPL CREATININE-BSD FRML MDRD: 120 ML/MIN/1.73SQ M
GLUCOSE SERPL-MCNC: 95 MG/DL (ref 65–140)
GLUCOSE UR STRIP-MCNC: NEGATIVE MG/DL
HCT VFR BLD AUTO: 34.8 % (ref 34.8–46.1)
HGB BLD-MCNC: 11.8 G/DL (ref 11.5–15.4)
HGB UR QL STRIP.AUTO: NEGATIVE
IMM GRANULOCYTES # BLD AUTO: 0.04 THOUSAND/UL (ref 0–0.2)
IMM GRANULOCYTES NFR BLD AUTO: 1 % (ref 0–2)
KETONES UR STRIP-MCNC: ABNORMAL MG/DL
LEUKOCYTE ESTERASE UR QL STRIP: NEGATIVE
LYMPHOCYTES # BLD AUTO: 1.08 THOUSANDS/ÂΜL (ref 0.6–4.47)
LYMPHOCYTES NFR BLD AUTO: 13 % (ref 14–44)
MCH RBC QN AUTO: 29.9 PG (ref 26.8–34.3)
MCHC RBC AUTO-ENTMCNC: 33.9 G/DL (ref 31.4–37.4)
MCV RBC AUTO: 88 FL (ref 82–98)
MONOCYTES # BLD AUTO: 0.43 THOUSAND/ÂΜL (ref 0.17–1.22)
MONOCYTES NFR BLD AUTO: 5 % (ref 4–12)
NEUTROPHILS # BLD AUTO: 6.72 THOUSANDS/ÂΜL (ref 1.85–7.62)
NEUTS SEG NFR BLD AUTO: 80 % (ref 43–75)
NITRITE UR QL STRIP: NEGATIVE
NRBC BLD AUTO-RTO: 0 /100 WBCS
PH UR STRIP.AUTO: 6.5 [PH]
PLATELET # BLD AUTO: 304 THOUSANDS/UL (ref 149–390)
PMV BLD AUTO: 8.9 FL (ref 8.9–12.7)
POTASSIUM SERPL-SCNC: 3.6 MMOL/L (ref 3.5–5.3)
PROT SERPL-MCNC: 5.9 G/DL (ref 6.4–8.4)
PROT UR STRIP-MCNC: NEGATIVE MG/DL
RBC # BLD AUTO: 3.95 MILLION/UL (ref 3.81–5.12)
SODIUM SERPL-SCNC: 136 MMOL/L (ref 135–147)
SP GR UR STRIP.AUTO: 1.02 (ref 1–1.03)
UROBILINOGEN UR STRIP-ACNC: <2 MG/DL
WBC # BLD AUTO: 8.38 THOUSAND/UL (ref 4.31–10.16)

## 2024-08-17 PROCEDURE — 99283 EMERGENCY DEPT VISIT LOW MDM: CPT

## 2024-08-17 PROCEDURE — 36415 COLL VENOUS BLD VENIPUNCTURE: CPT

## 2024-08-17 PROCEDURE — 96368 THER/DIAG CONCURRENT INF: CPT

## 2024-08-17 PROCEDURE — 99284 EMERGENCY DEPT VISIT MOD MDM: CPT | Performed by: EMERGENCY MEDICINE

## 2024-08-17 PROCEDURE — 96365 THER/PROPH/DIAG IV INF INIT: CPT

## 2024-08-17 PROCEDURE — 85025 COMPLETE CBC W/AUTO DIFF WBC: CPT | Performed by: EMERGENCY MEDICINE

## 2024-08-17 PROCEDURE — 81003 URINALYSIS AUTO W/O SCOPE: CPT | Performed by: EMERGENCY MEDICINE

## 2024-08-17 PROCEDURE — 80053 COMPREHEN METABOLIC PANEL: CPT | Performed by: EMERGENCY MEDICINE

## 2024-08-17 PROCEDURE — 96366 THER/PROPH/DIAG IV INF ADDON: CPT

## 2024-08-17 PROCEDURE — 96375 TX/PRO/DX INJ NEW DRUG ADDON: CPT

## 2024-08-17 RX ORDER — ONDANSETRON 4 MG/1
8 TABLET, FILM COATED ORAL EVERY 8 HOURS PRN
Qty: 15 TABLET | Refills: 3 | Status: SHIPPED | OUTPATIENT
Start: 2024-08-17

## 2024-08-17 RX ORDER — MAGNESIUM SULFATE HEPTAHYDRATE 40 MG/ML
2 INJECTION, SOLUTION INTRAVENOUS ONCE
Status: COMPLETED | OUTPATIENT
Start: 2024-08-17 | End: 2024-08-17

## 2024-08-17 RX ORDER — METOCLOPRAMIDE HYDROCHLORIDE 5 MG/ML
10 INJECTION INTRAMUSCULAR; INTRAVENOUS ONCE
Status: COMPLETED | OUTPATIENT
Start: 2024-08-17 | End: 2024-08-17

## 2024-08-17 RX ORDER — ONDANSETRON 4 MG/1
4 TABLET, FILM COATED ORAL EVERY 8 HOURS PRN
Qty: 15 TABLET | Refills: 0 | Status: SHIPPED | OUTPATIENT
Start: 2024-08-17

## 2024-08-17 RX ORDER — DIPHENHYDRAMINE HYDROCHLORIDE 50 MG/ML
25 INJECTION INTRAMUSCULAR; INTRAVENOUS ONCE
Status: COMPLETED | OUTPATIENT
Start: 2024-08-17 | End: 2024-08-17

## 2024-08-17 RX ADMIN — SODIUM CHLORIDE, SODIUM LACTATE, POTASSIUM CHLORIDE, AND CALCIUM CHLORIDE 500 ML: .6; .31; .03; .02 INJECTION, SOLUTION INTRAVENOUS at 06:34

## 2024-08-17 RX ADMIN — DIPHENHYDRAMINE HYDROCHLORIDE 25 MG: 50 INJECTION, SOLUTION INTRAMUSCULAR; INTRAVENOUS at 06:33

## 2024-08-17 RX ADMIN — SODIUM CHLORIDE, SODIUM LACTATE, POTASSIUM CHLORIDE, AND CALCIUM CHLORIDE 500 ML: .6; .31; .03; .02 INJECTION, SOLUTION INTRAVENOUS at 07:47

## 2024-08-17 RX ADMIN — METOCLOPRAMIDE HYDROCHLORIDE 10 MG: 5 INJECTION INTRAMUSCULAR; INTRAVENOUS at 06:30

## 2024-08-17 RX ADMIN — MAGNESIUM SULFATE HEPTAHYDRATE 2 G: 40 INJECTION, SOLUTION INTRAVENOUS at 07:03

## 2024-08-17 NOTE — Clinical Note
Jessica Alejandro was seen and treated in our emergency department on 8/17/2024.    No restrictions            Diagnosis:     Jessica  may return to work on return date.    She may return on this date: 08/20/2024         If you have any questions or concerns, please don't hesitate to call.      Whitney Correa RN    ______________________________           _______________          _______________  Hospital Representative                              Date                                Time

## 2024-08-17 NOTE — ED PROVIDER NOTES
History  Chief Complaint   Patient presents with    Headache     Patient reports headache for past 24 hours that woke her out of sleep this morning around 2am, hx of migraines, feels similar. Pt is 14 weeks pregnant and concerned for preeclampsia/pregnancy complications. +nausea, blurred vision with floaters.      34 yr female at approx 14 weeks iup -- had hyperemesis during first trimester which has improved- has hx of ocular migraines- and  regular migraines which have decreased  since cutting out gluten in diet- over last 24 hrs with gradual onset of diffuse pressure like circumferential headache  with n/v- photophobia -- headache is not an occualr migraine -  states during last preg had similar headache which responded to migraie ecocktail in er - no trauma- no fevers- no abd/flank pain - no gu/gyn comps- bp has been good during preg and no ble edema--       History provided by:  Patient   used: No    Headache  Pain location:  Generalized  Associated symptoms: nausea, photophobia and vomiting    Associated symptoms: no abdominal pain, no diarrhea, no dizziness, no eye pain, no numbness, no seizures and no weakness        Prior to Admission Medications   Prescriptions Last Dose Informant Patient Reported? Taking?   Prenatal Multivit-Min-Fe-FA (PRE- PO)   Yes No   Sig: Take by mouth      Facility-Administered Medications: None       Past Medical History:   Diagnosis Date    Anemia     no meds     Celiac disease     COVID-19 affecting pregnancy in second trimester 21    Gestational diabetes     gestational last pregnancy and prediabetes - Resolved with 40 lb weight loss    Gestational diabetes mellitus (GDM) in third trimester 2021    IBS (irritable bowel syndrome)     Migraine     occular migranes no meds     Numerous skin moles     pre cancerous moles removed     Urinary tract infection     Varicella     had a child        Past Surgical History:   Procedure Laterality Date     COLONOSCOPY  01/01/2012    negative    MOLE REMOVAL      SKIN TAG REMOVAL      removal of pre-cancerous moles x 2       Family History   Problem Relation Age of Onset    Fibroids Mother     Lymphoma Father 46        doing well - cancer free 2017    No Known Problems Sister     No Known Problems Sister     No Known Problems Brother     VERONICA disease Daughter     Speech disorder Daughter     No Known Problems Daughter     Diabetes Maternal Grandmother     Hypertension Maternal Grandmother     Diverticulitis Maternal Grandmother     Diabetes Maternal Grandfather      I have reviewed and agree with the history as documented.    E-Cigarette/Vaping    E-Cigarette Use Never User      E-Cigarette/Vaping Substances    Nicotine No     THC No     CBD No     Flavoring No     Other No     Unknown No      Social History     Tobacco Use    Smoking status: Never    Smokeless tobacco: Never   Vaping Use    Vaping status: Never Used   Substance Use Topics    Alcohol use: Not Currently    Drug use: Never       Review of Systems   Constitutional: Negative.    HENT: Negative.     Eyes:  Positive for photophobia. Negative for pain, discharge, redness, itching and visual disturbance.   Respiratory: Negative.     Cardiovascular: Negative.    Gastrointestinal:  Positive for nausea and vomiting. Negative for abdominal distention, abdominal pain, anal bleeding, blood in stool, constipation, diarrhea and rectal pain.   Endocrine: Negative.    Genitourinary: Negative.    Musculoskeletal: Negative.    Skin: Negative.    Allergic/Immunologic: Negative.    Neurological:  Positive for headaches. Negative for dizziness, tremors, seizures, syncope, facial asymmetry, speech difficulty, weakness, light-headedness and numbness.   Hematological: Negative.    Psychiatric/Behavioral: Negative.         Physical Exam  Physical Exam  Vitals and nursing note reviewed.   Constitutional:       Appearance: Normal appearance. She is not ill-appearing,  toxic-appearing or diaphoretic.      Comments: Avss-  pulse ox 100 % on ra- interpretation is normal- no intervention- sitting in dark room with sunglasses on --    HENT:      Head: Normocephalic and atraumatic.      Nose: Nose normal.      Mouth/Throat:      Mouth: Mucous membranes are moist.   Eyes:      General: No scleral icterus.        Right eye: No discharge.         Left eye: No discharge.      Extraocular Movements: Extraocular movements intact.      Conjunctiva/sclera: Conjunctivae normal.      Pupils: Pupils are equal, round, and reactive to light.      Comments: Mm pink    Neck:      Vascular: No carotid bruit.      Comments: No pmt c/t/l/s spine- no meningeal signs   Cardiovascular:      Rate and Rhythm: Normal rate and regular rhythm.      Pulses: Normal pulses.      Heart sounds: Normal heart sounds. No murmur heard.     No friction rub. No gallop.   Pulmonary:      Effort: Pulmonary effort is normal. No respiratory distress.      Breath sounds: Normal breath sounds. No stridor. No wheezing, rhonchi or rales.   Chest:      Chest wall: No tenderness.   Abdominal:      General: Bowel sounds are normal. There is no distension.      Palpations: Abdomen is soft. There is no mass.      Tenderness: There is no abdominal tenderness. There is no right CVA tenderness, left CVA tenderness, guarding or rebound.      Hernia: No hernia is present.      Comments: Soft nt/nd- no hsm - no cva tenderness- no peritoneal signs    Musculoskeletal:         General: No swelling, tenderness, deformity or signs of injury. Normal range of motion.      Cervical back: Normal range of motion and neck supple. No rigidity or tenderness.      Right lower leg: No edema.      Left lower leg: No edema.      Comments: Normal equal bilateral radial/dp pulses- no ble edema/calf tenderness/asym/ erythema    Lymphadenopathy:      Cervical: No cervical adenopathy.   Skin:     General: Skin is warm.      Capillary Refill: Capillary refill  takes less than 2 seconds.      Coloration: Skin is not jaundiced or pale.      Findings: No bruising, erythema, lesion or rash.   Neurological:      General: No focal deficit present.      Mental Status: She is alert and oriented to person, place, and time. Mental status is at baseline.      Cranial Nerves: No cranial nerve deficit.      Sensory: No sensory deficit.      Motor: No weakness.      Coordination: Coordination normal.      Gait: Gait normal.      Comments: Normal non focal neuro exam - normal  cn exam- normal bue/ble strength/sensation    Psychiatric:         Mood and Affect: Mood normal.         Behavior: Behavior normal.         Thought Content: Thought content normal.         Judgment: Judgment normal.         Vital Signs  ED Triage Vitals   Temperature Pulse Respirations Blood Pressure SpO2   08/17/24 0557 08/17/24 0555 08/17/24 0555 08/17/24 0555 08/17/24 0555   98 °F (36.7 °C) 74 18 116/62 100 %      Temp Source Heart Rate Source Patient Position - Orthostatic VS BP Location FiO2 (%)   08/17/24 0557 08/17/24 0555 08/17/24 0555 08/17/24 0555 --   Oral Monitor Sitting Right arm       Pain Score       08/17/24 0555       9           Vitals:    08/17/24 0555   BP: 116/62   Pulse: 74   Patient Position - Orthostatic VS: Sitting         Visual Acuity      ED Medications  Medications   lactated ringers bolus 500 mL (has no administration in time range)   metoclopramide (REGLAN) injection 10 mg (has no administration in time range)   diphenhydrAMINE (BENADRYL) injection 25 mg (has no administration in time range)   magnesium sulfate 2 g/50 mL IVPB (premix) 2 g (has no administration in time range)       Diagnostic Studies  Results Reviewed       Procedure Component Value Units Date/Time    CBC and differential [[id number]]     Lab Status: No result Specimen: Blood     Comprehensive metabolic panel [[id number]]     Lab Status: No result Specimen: Blood     UA (URINE) with reflex to Scope [[id number]]     Lab  Status: No result Specimen: Urine     Comprehensive metabolic panel [[id number]] Collected: 08/17/24 0603    Lab Status: In process Specimen: Blood from Arm, Right Updated: 08/17/24 0608    CBC and differential [[id number]] Collected: 08/17/24 0603    Lab Status: In process Specimen: Blood from Arm, Right Updated: 08/17/24 0608                   No orders to display              Procedures  Procedures         ED Course  ED Course as of 08/21/24 1819   Sat Aug 17, 2024   0729 Er md note- tp- re-eval- sound asleep-- with awake when ivf/meds are done    0833 -ER MD NOTE- PT - RE-EVALUATED-  IS NOW UP-- FEELS IMPROVED- FEELS JACKIE TO GO HOME- WILL D/C AFTER IVFS ARE DONE                                  SBIRT 20yo+      Flowsheet Row Most Recent Value   Initial Alcohol Screen: US AUDIT-C     1. How often do you have a drink containing alcohol? 0 Filed at: 08/17/2024 0554   2. How many drinks containing alcohol do you have on a typical day you are drinking?  0 Filed at: 08/17/2024 0554   3b. FEMALE Any Age, or MALE 65+: How often do you have 4 or more drinks on one occassion? 0 Filed at: 08/17/2024 0554   Audit-C Score 0 Filed at: 08/17/2024 0554   DINAH: How many times in the past year have you...    Used an illegal drug or used a prescription medication for non-medical reasons? Never Filed at: 08/17/2024 0554                      Medical Decision Making  Given hx-- er md highly doubt any serious cause of symptoms- dvst- ich-- pre-ecclampsia/bacterial meningitis-- pt will need tx and rte-eval     Problems Addressed:  Recurrent headache: acute illness or injury     Details: See chart and above     Amount and/or Complexity of Data Reviewed  Labs: ordered. Decision-making details documented in ED Course.     Details: All reviewed   Discussion of management or test interpretation with external provider(s): Moderate amount of er md thought complexity     Risk  OTC drugs.  Prescription drug management.  Decision regarding  hospitalization.                 Disposition  Final diagnoses:   None     ED Disposition       None          Follow-up Information    None         Patient's Medications   Discharge Prescriptions    No medications on file       No discharge procedures on file.    PDMP Review         Value Time User    PDMP Reviewed  Yes 11/29/2020 12:26 AM Jimi Dorsey MD            ED Provider  Electronically Signed by             Branden Coffman MD  08/21/24 2393

## 2024-08-17 NOTE — DISCHARGE INSTRUCTIONS
DIAGNOSIS; RECURRENT HEADACHE- MIGRAINE TYPE     - ACTIVITY/DIET AS TOLERATED     -  FOR ANY NAUSEA/ VOMITING- ZOFRAN 2 TABLETS  DISSOLVE IN THE MOUTH  EVERY 6-8 HRS AS NEEDED    - FOR DULL HEADACHE- OVER THE COUNTER GENERIC ACETAMINOPHEN 500 MG EVERY 4 HRS WHILE AWAKE    - PLEASE RETURN TO THE ER FOR ANY  RETURN OF WORSENING HEADACHE/ PERSISTENT VOMITING OR ANY NEW/ WORSENING/CONCERNING SYMPTOMS TO YOU

## 2024-08-17 NOTE — Clinical Note
Jessica Alejandro was seen and treated in our emergency department on 8/17/2024.    No restrictions            Diagnosis:     Jessica  may return to work on return date.    She may return on this date: 08/20/2024         If you have any questions or concerns, please don't hesitate to call.      Whitney Correa RN    ______________________________           _______________          _______________  Hospital Representative                              Date                                Time  returned call, he will do discharge medications for pt with office follow up in 2 weeks,requests house Dr to do rounds for discharge order.

## 2024-08-17 NOTE — TELEPHONE ENCOUNTER
"Reason for Disposition   [1] MODERATE headache (e.g., interferes with normal activities) AND [2] present > 24 hours AND [3] unexplained  (Exceptions: analgesics not tried, typical migraine, or headache part of viral illness)    Answer Assessment - Initial Assessment Questions  1. LOCATION: \"Where does it hurt?\"       On both sides near temples     2. ONSET: \"When did the headache start?\" (Minutes, hours or days)       Yesterday    3. PATTERN: \"Does the pain come and go, or has it been constant since it started?\"      Constant    4. SEVERITY: \"How bad is the pain?\" and \"What does it keep you from doing?\"     - MILD - doesn't interfere with normal activities     - MODERATE - interferes with normal activities or awakens from sleep     - SEVERE - excruciating pain, unable to do any normal activities         7/10    5. RECURRENT SYMPTOM: \"Have you ever had headaches before?\" If Yes, ask: \"When was the last time?\" and \"What happened that time?\"       No    6. CAUSE: \"What do you think is causing the headache?\"      Possibly a bit dehydrated - currently camping     7. MIGRAINE: \"Have you been diagnosed with migraine headaches?\" If Yes, ask: \"Is this headache similar?\"       Used to get migraines - got one with last pregnancy requiring ER/migriane cocktail    8. HEAD INJURY: \"Has there been any recent injury to the head?\"       No    9. OTHER SYMPTOMS: \"Do you have any other symptoms?\" (e.g., fever, stiff neck, blurred vision; swelling of hands, face, or feet)      No    Blood pressure has been normal at doctor's appointments so far     10. PREGNANCY: \"How many weeks pregnant are you?\"        14 weeks 5 days     11. KOFFI: \"What date are you expecting to deliver?\"        2/9/2025    Protocols used: Pregnancy - Headache-ADULT-AH    "

## 2024-08-19 NOTE — TELEPHONE ENCOUNTER
Placed call to follow up with patient. Upon chart review noted pt presented to ED 8/17/24 for evaluation and treatment of headache.   Left non detailed message on machine requesting patient return call to office.

## 2024-08-20 ENCOUNTER — TELEPHONE (OUTPATIENT)
Dept: OBGYN CLINIC | Facility: CLINIC | Age: 34
End: 2024-08-20

## 2024-08-20 DIAGNOSIS — Z87.59 H/O MIGRAINE DURING PREGNANCY: Primary | ICD-10-CM

## 2024-08-20 DIAGNOSIS — Z86.69 H/O MIGRAINE DURING PREGNANCY: Primary | ICD-10-CM

## 2024-08-20 RX ORDER — METOCLOPRAMIDE 10 MG/1
TABLET ORAL
Qty: 20 TABLET | Refills: 0 | Status: SHIPPED | OUTPATIENT
Start: 2024-08-20

## 2024-08-20 NOTE — TELEPHONE ENCOUNTER
"OB - 15 wk 2 days - increase with migraine h/a recently - moreso \"dull h/a\" now but waxes & wanes past several days - see recommendation per Dr Jordan re: taking Reglan in addition to Tylenol - patient had taken previously & is willing to try now.  Please sign off on prescription for same to [facility] ([place])  "

## 2024-08-27 NOTE — PROGRESS NOTES
VISIT 34 yr old  at 16w3d: Denies n/v/HA/cramping/vb/lof/edema/dv/smoking; urine neg/neg  Labs up to date; PNC - NIPT low risk; reviewed and ordered msAFP; level 2 scheduled   PNVs + DHA - tolerating daily  Good FM - feeling flutters    Encouraged hydration.   RTO in 4 weeks for routine ob check or sooner if needed

## 2024-08-28 ENCOUNTER — ROUTINE PRENATAL (OUTPATIENT)
Dept: OBGYN CLINIC | Facility: CLINIC | Age: 34
End: 2024-08-28

## 2024-08-28 VITALS
DIASTOLIC BLOOD PRESSURE: 70 MMHG | HEIGHT: 65 IN | SYSTOLIC BLOOD PRESSURE: 96 MMHG | BODY MASS INDEX: 28.72 KG/M2 | WEIGHT: 172.4 LBS

## 2024-08-28 DIAGNOSIS — O09.522 AMA (ADVANCED MATERNAL AGE) MULTIGRAVIDA 35+, SECOND TRIMESTER: ICD-10-CM

## 2024-08-28 DIAGNOSIS — Z86.32 H/O GESTATIONAL DIABETES IN PRIOR PREGNANCY, CURRENTLY PREGNANT: ICD-10-CM

## 2024-08-28 DIAGNOSIS — O09.299 H/O GESTATIONAL DIABETES IN PRIOR PREGNANCY, CURRENTLY PREGNANT: ICD-10-CM

## 2024-08-28 DIAGNOSIS — Z3A.16 16 WEEKS GESTATION OF PREGNANCY: Primary | ICD-10-CM

## 2024-08-28 PROCEDURE — PNV: Performed by: PHYSICIAN ASSISTANT

## 2024-09-03 DIAGNOSIS — Z87.59 H/O MIGRAINE DURING PREGNANCY: ICD-10-CM

## 2024-09-03 DIAGNOSIS — Z86.69 H/O MIGRAINE DURING PREGNANCY: ICD-10-CM

## 2024-09-04 RX ORDER — METOCLOPRAMIDE 10 MG/1
TABLET ORAL
Qty: 20 TABLET | Refills: 0 | Status: SHIPPED | OUTPATIENT
Start: 2024-09-04

## 2024-09-07 ENCOUNTER — APPOINTMENT (OUTPATIENT)
Dept: LAB | Facility: CLINIC | Age: 34
End: 2024-09-07
Payer: COMMERCIAL

## 2024-09-07 DIAGNOSIS — O09.522 AMA (ADVANCED MATERNAL AGE) MULTIGRAVIDA 35+, SECOND TRIMESTER: ICD-10-CM

## 2024-09-07 DIAGNOSIS — Z3A.16 16 WEEKS GESTATION OF PREGNANCY: ICD-10-CM

## 2024-09-07 PROCEDURE — 36415 COLL VENOUS BLD VENIPUNCTURE: CPT

## 2024-09-07 PROCEDURE — 82105 ALPHA-FETOPROTEIN SERUM: CPT

## 2024-09-09 LAB
2ND TRIMESTER 4 SCREEN SERPL-IMP: NORMAL
AFP ADJ MOM SERPL: 0.87
AFP INTERP AMN-IMP: NORMAL
AFP INTERP SERPL-IMP: NORMAL
AFP INTERP SERPL-IMP: NORMAL
AFP SERPL-MCNC: 33.7 NG/ML
AGE AT DELIVERY: 36 YR
GA METHOD: NORMAL
GA: 17.9 WEEKS
IDDM PATIENT QL: NO
MULTIPLE PREGNANCY: NO
NEURAL TUBE DEFECT RISK FETUS: NORMAL %

## 2024-09-17 ENCOUNTER — ROUTINE PRENATAL (OUTPATIENT)
Facility: HOSPITAL | Age: 34
End: 2024-09-17
Payer: COMMERCIAL

## 2024-09-17 VITALS
DIASTOLIC BLOOD PRESSURE: 62 MMHG | WEIGHT: 177.6 LBS | HEART RATE: 86 BPM | BODY MASS INDEX: 29.59 KG/M2 | SYSTOLIC BLOOD PRESSURE: 110 MMHG | HEIGHT: 65 IN

## 2024-09-17 DIAGNOSIS — Z36.3 ENCOUNTER FOR ANTENATAL SCREENING FOR MALFORMATION: ICD-10-CM

## 2024-09-17 DIAGNOSIS — Z3A.19 19 WEEKS GESTATION OF PREGNANCY: Primary | ICD-10-CM

## 2024-09-17 DIAGNOSIS — O09.522 AMA (ADVANCED MATERNAL AGE) MULTIGRAVIDA 35+, SECOND TRIMESTER: ICD-10-CM

## 2024-09-17 DIAGNOSIS — Z36.86 ENCOUNTER FOR ANTENATAL SCREENING FOR CERVICAL LENGTH: ICD-10-CM

## 2024-09-17 PROCEDURE — 76817 TRANSVAGINAL US OBSTETRIC: CPT | Performed by: STUDENT IN AN ORGANIZED HEALTH CARE EDUCATION/TRAINING PROGRAM

## 2024-09-17 PROCEDURE — 76811 OB US DETAILED SNGL FETUS: CPT | Performed by: STUDENT IN AN ORGANIZED HEALTH CARE EDUCATION/TRAINING PROGRAM

## 2024-09-17 PROCEDURE — 99213 OFFICE O/P EST LOW 20 MIN: CPT | Performed by: STUDENT IN AN ORGANIZED HEALTH CARE EDUCATION/TRAINING PROGRAM

## 2024-09-17 NOTE — PROGRESS NOTES
Ultrasound Probe Disinfection    A transvaginal ultrasound was performed.   Prior to use, disinfection was performed with High Level Disinfection Process ([employer]).  Probe serial number [id number] was used.    Constance Sena  09/17/24  1:58 PM

## 2024-09-17 NOTE — PROGRESS NOTES
"[facility]: Ms. Alejandro was seen today for anatomic survey and cervical length screening ultrasound.  See ultrasound report under \"OB Procedures\" tab.      MDM:   I. Diagnoses/Problems addressed:  minimal  II.  Data:  moderate  III.  Risk of morbidity:  minimal    Please don't hesitate to contact our office with any concerns or questions.  -Sweta Mar MD      "

## 2024-09-24 NOTE — PROGRESS NOTES
VISIT 34 yr old  at 20w3d: (+) cramping - mild/irregular tightening; Denies n/v/HA/vb/lof/edema/dv/smoking; urine neg/neg  Labs up to date; PNC - level 2 done; f/u growth 32w;   PNVs + DHA - tolerating daily  Good FM     Encouraged hydration. Encouraged the flu vaccine and COVID booster in pregnancy; Encouraged infection prevention/handwashing.  RTO in 4 weeks for routine ob check or sooner if needed    
Yes

## 2024-09-25 ENCOUNTER — ROUTINE PRENATAL (OUTPATIENT)
Dept: OBGYN CLINIC | Facility: CLINIC | Age: 34
End: 2024-09-25

## 2024-09-25 VITALS — SYSTOLIC BLOOD PRESSURE: 110 MMHG | WEIGHT: 179 LBS | BODY MASS INDEX: 29.79 KG/M2 | DIASTOLIC BLOOD PRESSURE: 70 MMHG

## 2024-09-25 DIAGNOSIS — Z3A.20 20 WEEKS GESTATION OF PREGNANCY: Primary | ICD-10-CM

## 2024-09-25 DIAGNOSIS — O09.522 AMA (ADVANCED MATERNAL AGE) MULTIGRAVIDA 35+, SECOND TRIMESTER: ICD-10-CM

## 2024-09-25 DIAGNOSIS — O09.299 H/O GESTATIONAL DIABETES IN PRIOR PREGNANCY, CURRENTLY PREGNANT: ICD-10-CM

## 2024-09-25 DIAGNOSIS — Z86.32 H/O GESTATIONAL DIABETES IN PRIOR PREGNANCY, CURRENTLY PREGNANT: ICD-10-CM

## 2024-09-25 PROCEDURE — PNV: Performed by: PHYSICIAN ASSISTANT

## 2024-10-17 NOTE — PROGRESS NOTES
VISIT 34 yr old  at 24w1d: Denies n/v/HA/cramping/vb/lof/edema/dv/smoking; urine neg/neg  Labs up to date; PNC - level 2 done; f/u growth 32w   PNVs + DHA - tolerating daily  Good FM     Third tri labs reviewed and ordered - CBC, anemia reflex, syphilis screen, 1 hour GTT;  Encouraged hydration. Encouraged the flu vaccine and COVID booster in pregnancy; Encouraged infection prevention/handwashing. Desires flu vac but left without receiving - offer next visit  We discussed her weight gain this preg - setting goals, calorie increase, watching diet;  RTO in 4 weeks for routine ob check or sooner if needed; offer flu vac next visit

## 2024-10-21 ENCOUNTER — ROUTINE PRENATAL (OUTPATIENT)
Dept: OBGYN CLINIC | Facility: CLINIC | Age: 34
End: 2024-10-21

## 2024-10-21 VITALS — DIASTOLIC BLOOD PRESSURE: 74 MMHG | WEIGHT: 187 LBS | BODY MASS INDEX: 31.12 KG/M2 | SYSTOLIC BLOOD PRESSURE: 108 MMHG

## 2024-10-21 DIAGNOSIS — Z86.32 H/O GESTATIONAL DIABETES IN PRIOR PREGNANCY, CURRENTLY PREGNANT: ICD-10-CM

## 2024-10-21 DIAGNOSIS — O09.299 H/O GESTATIONAL DIABETES IN PRIOR PREGNANCY, CURRENTLY PREGNANT: ICD-10-CM

## 2024-10-21 DIAGNOSIS — Z3A.24 24 WEEKS GESTATION OF PREGNANCY: Primary | ICD-10-CM

## 2024-10-21 DIAGNOSIS — O09.522 AMA (ADVANCED MATERNAL AGE) MULTIGRAVIDA 35+, SECOND TRIMESTER: ICD-10-CM

## 2024-10-21 PROCEDURE — PNV: Performed by: PHYSICIAN ASSISTANT

## 2024-11-09 ENCOUNTER — APPOINTMENT (OUTPATIENT)
Dept: LAB | Facility: CLINIC | Age: 34
End: 2024-11-09
Payer: COMMERCIAL

## 2024-11-09 DIAGNOSIS — K90.0 CELIAC DISEASE: ICD-10-CM

## 2024-11-09 DIAGNOSIS — E56.0 VITAMIN E DEFICIENCY: ICD-10-CM

## 2024-11-09 DIAGNOSIS — O09.299 H/O GESTATIONAL DIABETES IN PRIOR PREGNANCY, CURRENTLY PREGNANT: ICD-10-CM

## 2024-11-09 DIAGNOSIS — E56.9 VITAMIN DEFICIENCY: ICD-10-CM

## 2024-11-09 DIAGNOSIS — Z3A.24 24 WEEKS GESTATION OF PREGNANCY: ICD-10-CM

## 2024-11-09 DIAGNOSIS — E56.1 VITAMIN K DEFICIENCY: ICD-10-CM

## 2024-11-09 DIAGNOSIS — O09.522 AMA (ADVANCED MATERNAL AGE) MULTIGRAVIDA 35+, SECOND TRIMESTER: ICD-10-CM

## 2024-11-09 DIAGNOSIS — R23.3 EASY BRUISABILITY: ICD-10-CM

## 2024-11-09 DIAGNOSIS — Z86.32 H/O GESTATIONAL DIABETES IN PRIOR PREGNANCY, CURRENTLY PREGNANT: ICD-10-CM

## 2024-11-09 LAB
25(OH)D3 SERPL-MCNC: 22 NG/ML (ref 30–100)
BASOPHILS # BLD AUTO: 0.02 THOUSANDS/ΜL (ref 0–0.1)
BASOPHILS NFR BLD AUTO: 0 % (ref 0–1)
EOSINOPHIL # BLD AUTO: 0.11 THOUSAND/ΜL (ref 0–0.61)
EOSINOPHIL NFR BLD AUTO: 1 % (ref 0–6)
ERYTHROCYTE [DISTWIDTH] IN BLOOD BY AUTOMATED COUNT: 12.8 % (ref 11.6–15.1)
GLUCOSE 1H P 50 G GLC PO SERPL-MCNC: 141 MG/DL (ref 70–134)
HCT VFR BLD AUTO: 32.4 % (ref 34.8–46.1)
HGB BLD-MCNC: 11 G/DL (ref 11.5–15.4)
IMM GRANULOCYTES # BLD AUTO: 0.16 THOUSAND/UL (ref 0–0.2)
IMM GRANULOCYTES NFR BLD AUTO: 2 % (ref 0–2)
LYMPHOCYTES # BLD AUTO: 1.12 THOUSANDS/ΜL (ref 0.6–4.47)
LYMPHOCYTES NFR BLD AUTO: 13 % (ref 14–44)
MAGNESIUM SERPL-MCNC: 1.5 MG/DL (ref 1.9–2.7)
MCH RBC QN AUTO: 30.6 PG (ref 26.8–34.3)
MCHC RBC AUTO-ENTMCNC: 34 G/DL (ref 31.4–37.4)
MCV RBC AUTO: 90 FL (ref 82–98)
MONOCYTES # BLD AUTO: 0.48 THOUSAND/ΜL (ref 0.17–1.22)
MONOCYTES NFR BLD AUTO: 5 % (ref 4–12)
NEUTROPHILS # BLD AUTO: 6.97 THOUSANDS/ΜL (ref 1.85–7.62)
NEUTS SEG NFR BLD AUTO: 79 % (ref 43–75)
NRBC BLD AUTO-RTO: 0 /100 WBCS
PLATELET # BLD AUTO: 286 THOUSANDS/UL (ref 149–390)
PMV BLD AUTO: 8.9 FL (ref 8.9–12.7)
RBC # BLD AUTO: 3.6 MILLION/UL (ref 3.81–5.12)
WBC # BLD AUTO: 8.86 THOUSAND/UL (ref 4.31–10.16)

## 2024-11-09 PROCEDURE — 84446 ASSAY OF VITAMIN E: CPT

## 2024-11-09 PROCEDURE — 83735 ASSAY OF MAGNESIUM: CPT

## 2024-11-09 PROCEDURE — 36415 COLL VENOUS BLD VENIPUNCTURE: CPT

## 2024-11-09 PROCEDURE — 84597 ASSAY OF VITAMIN K: CPT

## 2024-11-09 PROCEDURE — 82950 GLUCOSE TEST: CPT

## 2024-11-09 PROCEDURE — 84207 ASSAY OF VITAMIN B-6: CPT

## 2024-11-09 PROCEDURE — 86780 TREPONEMA PALLIDUM: CPT

## 2024-11-09 PROCEDURE — 82306 VITAMIN D 25 HYDROXY: CPT

## 2024-11-09 PROCEDURE — 84590 ASSAY OF VITAMIN A: CPT

## 2024-11-10 LAB — TREPONEMA PALLIDUM IGG+IGM AB [PRESENCE] IN SERUM OR PLASMA BY IMMUNOASSAY: NORMAL

## 2024-11-11 DIAGNOSIS — R73.09 ABNORMAL GLUCOSE TOLERANCE TEST: ICD-10-CM

## 2024-11-11 DIAGNOSIS — Z3A.27 27 WEEKS GESTATION OF PREGNANCY: Primary | ICD-10-CM

## 2024-11-12 LAB
PHYTONADIONE SERPL-MCNC: 0.56 NG/ML (ref 0.1–2.2)
VIT B6 SERPL-MCNC: 3.7 UG/L (ref 3.4–65.2)

## 2024-11-14 ENCOUNTER — APPOINTMENT (OUTPATIENT)
Dept: LAB | Facility: CLINIC | Age: 34
End: 2024-11-14
Payer: COMMERCIAL

## 2024-11-14 ENCOUNTER — RESULTS FOLLOW-UP (OUTPATIENT)
Dept: OBGYN CLINIC | Facility: CLINIC | Age: 34
End: 2024-11-14

## 2024-11-14 DIAGNOSIS — Z3A.27 27 WEEKS GESTATION OF PREGNANCY: ICD-10-CM

## 2024-11-14 DIAGNOSIS — R73.09 ABNORMAL GLUCOSE TOLERANCE TEST: ICD-10-CM

## 2024-11-14 DIAGNOSIS — O24.419 GESTATIONAL DIABETES MELLITUS (GDM) IN SECOND TRIMESTER, GESTATIONAL DIABETES METHOD OF CONTROL UNSPECIFIED: ICD-10-CM

## 2024-11-14 DIAGNOSIS — Z3A.27 27 WEEKS GESTATION OF PREGNANCY: Primary | ICD-10-CM

## 2024-11-14 LAB
A-TOCOPHEROL VIT E SERPL-MCNC: 8.6 MG/L (ref 5.9–19.4)
GAMMA TOCOPHEROL SERPL-MCNC: 0.9 MG/L (ref 0.7–4.9)
GLUCOSE 1H P 100 G GLC PO SERPL-MCNC: 185 MG/DL (ref 65–179)
GLUCOSE 2H P 100 G GLC PO SERPL-MCNC: 151 MG/DL (ref 65–154)
GLUCOSE 3H P 100 G GLC PO SERPL-MCNC: 82 MG/DL (ref 65–139)
GLUCOSE P FAST SERPL-MCNC: 96 MG/DL (ref 65–94)
VIT A SERPL-MCNC: 18.8 UG/DL (ref 18.9–57.3)

## 2024-11-14 PROCEDURE — 82952 GTT-ADDED SAMPLES: CPT

## 2024-11-14 PROCEDURE — 82951 GLUCOSE TOLERANCE TEST (GTT): CPT

## 2024-11-14 PROCEDURE — 36415 COLL VENOUS BLD VENIPUNCTURE: CPT

## 2024-11-18 ENCOUNTER — PATIENT MESSAGE (OUTPATIENT)
Dept: PERINATAL CARE | Facility: CLINIC | Age: 34
End: 2024-11-18

## 2024-11-18 ENCOUNTER — TELEMEDICINE (OUTPATIENT)
Dept: PERINATAL CARE | Facility: CLINIC | Age: 34
End: 2024-11-18
Payer: COMMERCIAL

## 2024-11-18 DIAGNOSIS — O09.299 H/O GESTATIONAL DIABETES IN PRIOR PREGNANCY, CURRENTLY PREGNANT: ICD-10-CM

## 2024-11-18 DIAGNOSIS — K90.0 CELIAC DISEASE: ICD-10-CM

## 2024-11-18 DIAGNOSIS — O24.410 DIET CONTROLLED GESTATIONAL DIABETES MELLITUS (GDM) IN THIRD TRIMESTER: Primary | ICD-10-CM

## 2024-11-18 DIAGNOSIS — Z3A.28 28 WEEKS GESTATION OF PREGNANCY: ICD-10-CM

## 2024-11-18 DIAGNOSIS — Z86.32 H/O GESTATIONAL DIABETES IN PRIOR PREGNANCY, CURRENTLY PREGNANT: ICD-10-CM

## 2024-11-18 PROCEDURE — G0109 DIAB MANAGE TRN IND/GROUP: HCPCS | Performed by: DIETITIAN, REGISTERED

## 2024-11-18 NOTE — PROGRESS NOTES
"    Thank you for referring your patient to [facility] Maternal Fetal Medicine Diabetes in Pregnancy Program.     Jessica Alejandro is a  34 y.o. female who presents today for Group Class 1.  Patient is at 28w1d gestation, Estimated Date of Delivery: 25.     Reviewed and updated the following from patients medical record: PMH, Problem List, Allergies, and Current Medications.    Visit Diagnosis:  Diet controlled GDM    Discussed with patient pathophysiology of GDM, untreated hyperglycemia in pregnancy and maternal fetal complications including fetal macrosomia,  hypoglycemia, polyhydramnios, increased incidence of  section,  labor, and in severe cases fetal demise and still birth . Discussed importance of blood glucose monitoring, nutrition, and medication if necessary in achieving BG goals.     Additional Pregnancy Complications:  Overweight prior to pregnancy, History of diet controlled gestational dabietes in  managed by this program , Celiac Disease    Labs:    Lab Results   Component Value Date    [id number] 141 (H) 2024       Lab Results   Component Value Date    GLUF 96 (H) 2024    RSLUBPD9QI 185 (H) 2024    UHEAQTL7VV 151 2024    BTJROBE3BD 82 2024        No components found for: \"HGA1C\"    Medications:  No diabetes related medications    Anthropometrics:  Ht Readings from Last 3 Encounters:   24 5' 5\" (1.651 m)   24 5' 5\" (1.651 m)   24 5' 5\" (1.651 m)     Wt Readings from Last 3 Encounters:   10/21/24 84.8 kg (187 lb)   24 81.2 kg (179 lb)   24 80.6 kg (177 lb 9.6 oz)     Pre-gravid weight: 73.9 kg (163 lb)  Pre-gravid BMI: 27.12  Weight Change: 10.9 kg (24 lb)  Weight gain recommendations: BMI (25-29.9) 15-25 lbs  Comments: may gain 1 more pounds for the remainder of the pregnancy    Recent Ultra Sound Results:  Date: 24  Fetal Growth: Normal  JADYN: Normal  Next US date: 12/10/24    Blood Glucose Monitoring: "   Glucose Meter: OneTouch Verio Flex  Instructed on testing blood sugars: 4 x per day (Fasting, 2 hour after start of each meal)    Gave instruction on site selection, skin preparation, loading strips and lancet device, meter activation, obtaining blood sample, test strip and lancet disposal and storage, and recording log book entries. Patient has good understanding of material covered and was able to test their own blood sugar in office today.     Instruction for reporting blood sugar results weekly via:  Phone: [phone]   OR  My Chart (Message with image attachment, or Glucose Flowsheet)    Goal Blood Sugar Ranges:   Fastin-90 mg/dL  1 hour after the start of each meal: 140 mg/dL or less  2 hours after start of each meal: 120 mg/dL or less    Meal Plan (daily calorie and protein needs):  Calories: 2200 calorie (CHO:19-69-81-30-60-30) (PRO: 3-2-3/4-2-3/4-2)    Type of Diet: Gluten Free  Additional Nutrition Concerns: Stated she cannot tolerate any gluten at all. Intolerance to most diary foods too.     Meal Plan Tips:  1. Patient was provided with a meal plan including 3 meals and 3 snacks.  2. Discussed appropriate amounts of CHO, PRO, and Fat at each meal and snack.   3. Reviewed CHO exchange list, and portion sizes for both CHO and PRO via food models  4. Instruction on how to read a food label  5. Provided suggested meal/snack options to increase nutrition and maintain consistent meal and snack intakes.  6. Instructed on how to keep a 3-day food diary to be brought to follow- up appointment.   7. Encouraged  patient to eat every 2.0-3.5 hours while awake  8. Encouraged patient to go no longer than 8-10 hours fasting overnight until first meal of the day.      Physical Activity:  Discussed benefits of physical activity to optimize blood glucose control, encouraged activity at patient is physically able. Always consult a physician prior to starting an exercise program. Recommend 20-30 minutes  "daily.    Patient Stated Goal: \"I will report my blood sugar values to the diabetes educators each week\"    Diabetes Self Management Support Plan outside of ongoing care: Spouse/Family    Learner/s Present:Learners Present: Patient   Barriers to Learning/Change: No Barriers  Expected Compliance: good    Date to report blood sugars: , 24  Class 2 (date): Monday, 24    Begin Time: 1 PM  End Time: 2:05 PM    It was a pleasure working with them today. Please feel free to call with any questions or concerns.    Mindy Wall, MS, RD, [facility]  Diabetes Educator  [facility] Maternal Fetal Medicine  Diabetes in Pregnancy Program  [address], [address]  [place] [address]       Virtual Regular Visit  Name: Jessica Alejandro      : 1990      MRN: [id number]  Encounter Provider: Mindy Wall  Encounter Date: 2024   Encounter department: [facility]      Verification of patient location:[place]  Patient is located at Other in the following state in which I hold an active license [place] :  Assessment & Plan  28 weeks gestation of pregnancy         Diet controlled gestational diabetes mellitus (GDM) in third trimester    Lab Results   Component Value Date    HGBA1C 4.7 2023     Orders:    Ambulatory Referral to Maternal Fetal Medicine    H/O gestational diabetes in prior pregnancy, currently pregnant         Celiac disease               Encounter provider Mindy Wall    The patient was identified by name and date of birth. Jessica Alejandro was informed that this is a telemedicine visit and that the visit is being conducted through the [employer] platform. She agrees to proceed..  My office door was closed. No one else was in the room.  She acknowledged consent and understanding of privacy and security of the video platform. The patient has agreed to participate and understands they can discontinue the visit at any time.    Patient is aware this is a " billable service.     History of Present Illness     HPI  Review of Systems    Objective   LMP 05/05/2024 (Exact Date)     Physical Exam--not perfomred    Visit Time  Total Visit Duration: 60 minutes

## 2024-11-19 RX ORDER — LANCETS 33 GAUGE
EACH MISCELLANEOUS
Qty: 100 EACH | Refills: 5 | Status: SHIPPED | OUTPATIENT
Start: 2024-11-19 | End: 2025-02-02

## 2024-11-19 RX ORDER — BLOOD-GLUCOSE METER
EACH MISCELLANEOUS
Qty: 1 KIT | Refills: 0 | Status: SHIPPED | OUTPATIENT
Start: 2024-11-19 | End: 2025-02-09

## 2024-11-19 RX ORDER — BLOOD SUGAR DIAGNOSTIC
STRIP MISCELLANEOUS
Qty: 100 STRIP | Refills: 5 | Status: SHIPPED | OUTPATIENT
Start: 2024-11-19 | End: 2025-02-09

## 2024-11-19 NOTE — PROGRESS NOTES
Virtual visit attempted.  Patient did not connect.  Direct  link texted to phone as well without connection.

## 2024-11-21 ENCOUNTER — ROUTINE PRENATAL (OUTPATIENT)
Dept: OBGYN CLINIC | Facility: CLINIC | Age: 34
End: 2024-11-21
Payer: COMMERCIAL

## 2024-11-21 VITALS
HEIGHT: 65 IN | BODY MASS INDEX: 32.32 KG/M2 | SYSTOLIC BLOOD PRESSURE: 102 MMHG | WEIGHT: 194 LBS | DIASTOLIC BLOOD PRESSURE: 74 MMHG

## 2024-11-21 DIAGNOSIS — O24.410 DIET CONTROLLED GESTATIONAL DIABETES MELLITUS (GDM) IN THIRD TRIMESTER: ICD-10-CM

## 2024-11-21 DIAGNOSIS — Z23 ENCOUNTER FOR IMMUNIZATION: ICD-10-CM

## 2024-11-21 DIAGNOSIS — Z30.09 FAMILY PLANNING: ICD-10-CM

## 2024-11-21 DIAGNOSIS — Z3A.28 28 WEEKS GESTATION OF PREGNANCY: Primary | ICD-10-CM

## 2024-11-21 PROCEDURE — 90471 IMMUNIZATION ADMIN: CPT | Performed by: STUDENT IN AN ORGANIZED HEALTH CARE EDUCATION/TRAINING PROGRAM

## 2024-11-21 PROCEDURE — 90715 TDAP VACCINE 7 YRS/> IM: CPT | Performed by: STUDENT IN AN ORGANIZED HEALTH CARE EDUCATION/TRAINING PROGRAM

## 2024-11-21 PROCEDURE — PNV: Performed by: STUDENT IN AN ORGANIZED HEALTH CARE EDUCATION/TRAINING PROGRAM

## 2024-11-21 NOTE — ASSESSMENT & PLAN NOTE
- considering tubal only if CD is indicated, MA 31 signed today. Otherwise  plans to get vasectomy

## 2024-11-21 NOTE — PROGRESS NOTES
Assessment/Plan  Problem List Items Addressed This Visit       28 weeks gestation of pregnancy - Primary      Jessica presents today for her OB visit.  She is currently 28w4d.    Vitals:    24 0900   BP: 102/74       The patient has completed her 28 week labs and they have been reviewed.  Patient's blood type is A +   I have given the patient the third trimester OB packet with instructions to review and discuss it with her partner, complete it and bring it with her to the labor and delivery.    I have recommended the Tdap vaccine as discussed at her 24 week visit    The patient has received the Tdap vaccine at this visit.    Warning signs in pregnancy:    I have reviewed the warning signs of pregnancy in the third trimester and advised patient to notify provider immediately if she experiences any of the following:  vaginal bleeding, baby moving less than normal or not at all, or abdominal pain.    Contraceptive options were reviewed, including hormonal methods, both combination (pill, patch, vaginal ring) and progesterone-only (pill, Depo Provera and Nexplanon), intrauterine devices (Mirena, Cheryl and Paragard), barrier methods (condoms, diaphragm) and male/female sterilization.  I have reviewed the breastfeeding friendly contraceptive options also.  The mechanisms, risks, benefits and side effects of all methods were discussed.  All questions have been answered to her satisfaction.           Relevant Orders    Tdap Vaccine greater than or equal to 8yo (Completed)    Diet controlled gestational diabetes mellitus (GDM) in third trimester               Family planning    - considering tubal only if CD is indicated, MA 31 signed today. Otherwise  plans to get vasectomy              Other Visit Diagnoses         Encounter for immunization        Relevant Orders    Tdap Vaccine greater than or equal to 8yo (Completed)            Fartun Lopez is a 34 y.o. female,  with an Estimated Date of  Delivery: 2/9/25 with a current gestational age of 28w4d. Patient reports occasional contractions and swelling . Fetal movement: active.     History  The following portions of the patient's history were reviewed and updated as appropriate: allergies, current medications, past family history, past medical history, past social history, past surgical history and problem list.    Objective  Vitals:    11/21/24 0900   BP: 102/74     FHT: 145  FH: 26  Urine: neg/neg

## 2024-11-21 NOTE — ASSESSMENT & PLAN NOTE
Jessica presents today for her OB visit.  She is currently 28w4d.    Vitals:    11/21/24 0900   BP: 102/74       The patient has completed her 28 week labs and they have been reviewed.  Patient's blood type is A +   I have given the patient the third trimester OB packet with instructions to review and discuss it with her partner, complete it and bring it with her to the labor and delivery.    I have recommended the Tdap vaccine as discussed at her 24 week visit    The patient has received the Tdap vaccine at this visit.    Warning signs in pregnancy:    I have reviewed the warning signs of pregnancy in the third trimester and advised patient to notify provider immediately if she experiences any of the following:  vaginal bleeding, baby moving less than normal or not at all, or abdominal pain.    Contraceptive options were reviewed, including hormonal methods, both combination (pill, patch, vaginal ring) and progesterone-only (pill, Depo Provera and Nexplanon), intrauterine devices (Mirena, Cheryl and Paragard), barrier methods (condoms, diaphragm) and male/female sterilization.  I have reviewed the breastfeeding friendly contraceptive options also.  The mechanisms, risks, benefits and side effects of all methods were discussed.  All questions have been answered to her satisfaction.

## 2024-11-25 ENCOUNTER — DOCUMENTATION (OUTPATIENT)
Dept: PERINATAL CARE | Facility: CLINIC | Age: 34
End: 2024-11-25

## 2024-11-25 ENCOUNTER — TELEMEDICINE (OUTPATIENT)
Dept: PERINATAL CARE | Facility: CLINIC | Age: 34
End: 2024-11-25
Payer: COMMERCIAL

## 2024-11-25 DIAGNOSIS — O09.299 H/O GESTATIONAL DIABETES IN PRIOR PREGNANCY, CURRENTLY PREGNANT: ICD-10-CM

## 2024-11-25 DIAGNOSIS — Z86.32 H/O GESTATIONAL DIABETES IN PRIOR PREGNANCY, CURRENTLY PREGNANT: ICD-10-CM

## 2024-11-25 DIAGNOSIS — K90.0 CELIAC DISEASE: ICD-10-CM

## 2024-11-25 DIAGNOSIS — O24.410 DIET CONTROLLED GESTATIONAL DIABETES MELLITUS (GDM) IN THIRD TRIMESTER: Primary | ICD-10-CM

## 2024-11-25 DIAGNOSIS — Z3A.29 29 WEEKS GESTATION OF PREGNANCY: ICD-10-CM

## 2024-11-25 PROCEDURE — G0109 DIAB MANAGE TRN IND/GROUP: HCPCS | Performed by: DIETITIAN, REGISTERED

## 2024-11-25 NOTE — PROGRESS NOTES
"    Thank you for referring your patient to [facility] Maternal Fetal Medicine Diabetes in Pregnancy Program.     Jessica Alejandro is a  34 y.o. female who presents today for Group  Class 2.  Patient is at 29w1d gestation, Estimated Date of Delivery: 25.     Reviewed and updated the following from patients medical record: PMH, Problem List, Allergies, and Current Medications.    Visit Diagnosis:  Diet controlled GDM    Additional Pregnancy Complications:  Overweight prior to pregnancy. History of gestational diabetes in  managed by this program.     Labs:    Lab Results   Component Value Date    [id number] 141 (H) 2024       Lab Results   Component Value Date    GLUF 96 (H) 2024    MCHHHIS2MJ 185 (H) 2024    BDTPNRF0MJ 151 2024    IPBADJS2RM 82 2024        No components found for: \"HGA1C\"    Medications:  No diabetes related medications    Anthropometrics:  Ht Readings from Last 3 Encounters:   24 5' 5\" (1.651 m)   24 5' 5\" (1.651 m)   24 5' 5\" (1.651 m)     Wt Readings from Last 3 Encounters:   24 88 kg (194 lb)   10/21/24 84.8 kg (187 lb)   24 81.2 kg (179 lb)     Pre-gravid weight: 73.9 kg (163 lb)  Pre-gravid BMI: 27.12  Weight Change: 14.1 kg (31 lb)  Weight gain recommendations: BMI (25-29.9) 15-25 lbs  Comments: needs to maintain her weight for the reminder of the pregnancy    Recent Ultra Sound Results:  Date:24  Fetal Growth:Normal  JADYN: Normal  Next US date: 12/10/24    Blood Glucose Monitoring:  Reinforcement of blood glucose goals and reporting guidelines.     Glucose Meter: OneTouch Verio Flex  Testing blood sugars: 4 x per day (Fasting, 2 hour after start of each meal)  Method of reporting blood sugars:Glucose Flowsheet    Report blood sugar results weekly via:  Phone: [phone]   OR  My Chart (Message with image attachment, or Glucose Flowsheet)    Goal Blood Sugar Ranges:   Fastin-95 mg/dL  1 hour after the start of each " meal: 140 mg/dL or less  2 hours after start of each meal: 120 mg/dL or less      BG Log:  Review of blood glucose log.  Discussed at this class    Reported she noticed she needs 2 CHO servings (30 gm) with her bedtime snack to avoid a high FBS the next morning.     Meal Plan:  Calories: 2200 calorie (CHO:85-61-01-30-60-30) (PRO: 3-2-3/4-2-3/4-2)    Diet Type: Low Carbohydrate  Additional Nutrition concerns: Avoids Gluten foods due to celicac disease. Unable to tolerate any foods containing gluten. Avoid milk    24 hr Diet Recall:  Provided at this class    Diet review: uses some Gluten free products. Diet recall indicates she is following the meal plan closely. j    Diet Instruction:  The patient was instructed on the following:  Individualized meal plan.   Importance of consistent carbohydrate intake via 3 meals and 3 snacks per day   Importance of protein as it relates to blood glucose control.   Encouraged  patient to eat every 2.0-3.5 hours while awake  Encouraged patient to go no longer than 8-10 hours fasting overnight until first meal of the day.  Provided suggested meal/snack options to increase nutrition and maintain consistent meal and snack intakes.      Physical Activity:  Discussed benefits of physical activity to optimize blood glucose control, encouraged activity at patient is physically able. Always consult a physician prior to starting an exercise program. Recommend 20-30 minutes daily.  Is patient physically active?  Encouraged walking daily    Sick day Guidelines:   Patient advised that sickness will raise blood sugar and need to continue medication regimen as directed. If blood sugar is > 160 mg/dL twice in one day call doctor. Instructed on what to do when unable to consume normal meal plan.     Hypoglycemia & Treatment Guidelines:  Reviewed what hypoglycemia is, signs and symptoms, and how to treat via the 15:15 rule. Stated she had a 68 BG this morning about 10:15 AM after her normal breakst  "& ate a banana & felt better. Her breakfast was oatmeal &  blueberries--did not have any protein. The protein would have helped to hold her longer with breakfast.     Post-Partum Guidelines:  Completion of 75 gm CHO 2 hr gtt at 6 weeks post-partum to check for Type 2 DM diagnosis    Breastfeeding Guidelines:  Continue GDM meal plan plus additional 350-500 calories daily. Stay hydrated by drinking 8-10 (8 oz.) fluids daily. Examples of protein and carbohydrate snacks provided.    Dining Out & Travel Guidelines:  Patient advised to be prepared with extra diabetes supplies, medications, and snacks, as well as sticking to the same time schedule and portions eaten at home for meals and snacks.    Maternal-Fetal Testing:    Ultrasounds- growth scans every 4 weeks.   NST- twice weekly starting at 32nd week GA   JADYN- weekly starting at 32 weeks GA    Patient Stated Goal: \"I will report my blood sugar values to the diabetes educators each week\"  Goal Assessment: On track    Diabetes Self Management Support Plan outside of ongoing care: Spouse/Family    Learner/s Present:Learners Present: Patient   Barriers to Learning/Change: No Barriers  Expected Compliance: good    Date to report blood sugars: weekly  Follow up date: as needed    Begin Time: 1 PM  End Time: 2 PM    It was a pleasure working with them today. Please feel free to call with any questions or concerns.    Mindy Wall, MS, RD, [facility]  Diabetes Educator  [facility] Maternal Fetal Medicine  Diabetes in Pregnancy Program  [address], [address]  [place] [address]       Virtual Regular Visit  Name: Jessica Alejandro      : 1990      MRN: [id number]  Encounter Provider: Mindy Wall  Encounter Date: 2024   Encounter department: [facility]      Verification of patient location: [place], [place]  Patient is located at Home in the following state in which I hold an active license [place] :  Assessment & Plan  Diet controlled " gestational diabetes mellitus (GDM) in third trimester    Lab Results   Component Value Date    HGBA1C 4.7 03/25/2023          Celiac disease         H/O gestational diabetes in prior pregnancy, currently pregnant         29 weeks gestation of pregnancy               Encounter provider Mindy Wall    The patient was identified by name and date of birth. Jessica Alejandro was informed that this is a telemedicine visit and that the visit is being conducted through the [employer] platform. She agrees to proceed..  My office door was closed. No one else was in the room.  She acknowledged consent and understanding of privacy and security of the video platform. The patient has agreed to participate and understands they can discontinue the visit at any time.    Patient is aware this is a billable service.     History of Present Illness     HPI  Review of Systems    Objective   LMP 05/05/2024 (Exact Date)     Physical Exam--not performed    Visit Time  Total Visit Duration: 60 minutes

## 2024-12-04 ENCOUNTER — DOCUMENTATION (OUTPATIENT)
Dept: PERINATAL CARE | Facility: CLINIC | Age: 34
End: 2024-12-04

## 2024-12-04 NOTE — PROGRESS NOTES
"  Date: 12/04/24  Jessica Alejandro  1990  Estimated Date of Delivery: 2/9/25  30w3d  OB/GYN:[facility]    Diagnosis:  Diet controlled GDM    Blood Sugar Logs Submitted via: Glucose Flowsheet            Assessment and Plan:  No diabetes related medications. Advised to take her FBS at the same abundio daily. On her days off she can go back to sleep . Only snack OK in the middle of the night is 1 oz protein. May need to begin medication soon.  Walking after dinner is suggested to hlep lower her FBS. .  2200 calorie (CHO:17-81-00-30-60-30) (PRO: 3-2-3/4-2-3/4-2) meal plan consisting of 3 meals and 3 snacks daily including protein at each. Has celiac disease & avoids with gluten.    Advised patient to continue current meal plan  Avoid fasting for > 10 hours overnight  Continue SMBG 4 x per day (Fasting, 2 hour after start of each meal)  If okay by physician, recommend up to 30 minutes of physical activity daily     Lab Work:   Lab Results   Component Value Date    HGBA1C 4.7 03/25/2023      Ultrasound:       Date:9/17/24       Fetal Growth: Normal       JADYN: Normal  Next: 12/10/24    Diabetes Self Management Support Plan outside of ongoing care: Spouse/Family   Patient Stated Goal: \"I will report my blood sugar values to the diabetes educators each week\"   Goal Assessment: On track    Date to report next: weekly    Mindy Wall, MS, RD, [facility]  Diabetes Educator  [facility] Maternal Fetal Medicine  Diabetes in Pregnancy Program  [address], [address]  [place] [address]    "

## 2024-12-06 ENCOUNTER — ROUTINE PRENATAL (OUTPATIENT)
Dept: OBGYN CLINIC | Facility: CLINIC | Age: 34
End: 2024-12-06

## 2024-12-06 VITALS
HEIGHT: 65 IN | BODY MASS INDEX: 32.32 KG/M2 | DIASTOLIC BLOOD PRESSURE: 70 MMHG | SYSTOLIC BLOOD PRESSURE: 102 MMHG | WEIGHT: 194 LBS

## 2024-12-06 DIAGNOSIS — O24.410 DIET CONTROLLED GESTATIONAL DIABETES MELLITUS (GDM) IN THIRD TRIMESTER: Primary | ICD-10-CM

## 2024-12-06 PROCEDURE — PNV: Performed by: STUDENT IN AN ORGANIZED HEALTH CARE EDUCATION/TRAINING PROGRAM

## 2024-12-06 NOTE — PROGRESS NOTES
Jessica is a 34-year-old -0-2-3 at 30 weeks and 5 days presenting for routine prenatal care-she denies any cramping, contractions, loss of fluid or vaginal bleeding.  She endorses good fetal movement.  Urine negative/negative.    Pregnancy is complicated by GDM, fibroids and AMA.    She reports concerns of hemorrhoids and having some bleeding with bowel movements.  We did review good p.o. hydration and initiating a bowel regimen to keep her stools soft and medications safe in pregnancy to use for discomfort.  Has follow-up growth scan and continues to follow closely with our diabetic educators.  Return to office in 2 weeks or sooner if needed.

## 2024-12-10 ENCOUNTER — ULTRASOUND (OUTPATIENT)
Facility: HOSPITAL | Age: 34
End: 2024-12-10
Payer: COMMERCIAL

## 2024-12-10 VITALS
DIASTOLIC BLOOD PRESSURE: 58 MMHG | WEIGHT: 194.45 LBS | HEART RATE: 102 BPM | HEIGHT: 65 IN | BODY MASS INDEX: 32.4 KG/M2 | SYSTOLIC BLOOD PRESSURE: 106 MMHG

## 2024-12-10 DIAGNOSIS — Z3A.31 31 WEEKS GESTATION OF PREGNANCY: Primary | ICD-10-CM

## 2024-12-10 DIAGNOSIS — O24.410 DIET CONTROLLED GESTATIONAL DIABETES MELLITUS (GDM) IN THIRD TRIMESTER: ICD-10-CM

## 2024-12-10 DIAGNOSIS — O09.523 AMA (ADVANCED MATERNAL AGE) MULTIGRAVIDA 35+, THIRD TRIMESTER: ICD-10-CM

## 2024-12-10 PROCEDURE — 76816 OB US FOLLOW-UP PER FETUS: CPT | Performed by: OBSTETRICS & GYNECOLOGY

## 2024-12-10 PROCEDURE — 99213 OFFICE O/P EST LOW 20 MIN: CPT | Performed by: OBSTETRICS & GYNECOLOGY

## 2024-12-10 NOTE — PROGRESS NOTES
The patient was seen today for an ultrasound.  Please see ultrasound report (located under Ob Procedures) for additional details.   Thank you very much for allowing us to participate in the care of this very nice patient.  Should you have any questions, please do not hesitate to contact me.     Hakeem Garcia MD FACOG  Attending Physician, Maternal-Fetal Medicine  [facility]

## 2024-12-11 ENCOUNTER — TELEPHONE (OUTPATIENT)
Dept: OBGYN CLINIC | Facility: CLINIC | Age: 34
End: 2024-12-11

## 2024-12-11 NOTE — TELEPHONE ENCOUNTER
3rd trimester call - 31 wks 3 days - left message patient's VM     Overall how are you feeling?     Compliant with routine OB appointments? Yes & scheduled to 40 wks    Have you completed your 3rd trimester lab work? Yes - had follow up with diab educ 11/25/2024 for GDM    Have you reviewed the contents of 3rd trimester folder from office?     Have you decided on a pediatrician?     Questions on paperwork to go back to office?   Questions on the baby birth certificate and photography forms?     Send link for the Hospital Readiness Video via [employer] - sent via [employer] 12/11/2024

## 2024-12-16 ENCOUNTER — ROUTINE PRENATAL (OUTPATIENT)
Dept: OBGYN CLINIC | Facility: CLINIC | Age: 34
End: 2024-12-16
Payer: COMMERCIAL

## 2024-12-16 VITALS
BODY MASS INDEX: 32.32 KG/M2 | DIASTOLIC BLOOD PRESSURE: 68 MMHG | SYSTOLIC BLOOD PRESSURE: 102 MMHG | HEIGHT: 65 IN | WEIGHT: 194 LBS

## 2024-12-16 DIAGNOSIS — O24.410 DIET CONTROLLED GESTATIONAL DIABETES MELLITUS (GDM) IN THIRD TRIMESTER: ICD-10-CM

## 2024-12-16 DIAGNOSIS — Z86.32 H/O GESTATIONAL DIABETES IN PRIOR PREGNANCY, CURRENTLY PREGNANT: ICD-10-CM

## 2024-12-16 DIAGNOSIS — D25.9 UTERINE FIBROID DURING PREGNANCY, ANTEPARTUM: ICD-10-CM

## 2024-12-16 DIAGNOSIS — O09.523 AMA (ADVANCED MATERNAL AGE) MULTIGRAVIDA 35+, THIRD TRIMESTER: Primary | ICD-10-CM

## 2024-12-16 DIAGNOSIS — O09.299 H/O GESTATIONAL DIABETES IN PRIOR PREGNANCY, CURRENTLY PREGNANT: ICD-10-CM

## 2024-12-16 DIAGNOSIS — Z30.09 FAMILY PLANNING: ICD-10-CM

## 2024-12-16 DIAGNOSIS — Z23 ENCOUNTER FOR IMMUNIZATION: ICD-10-CM

## 2024-12-16 DIAGNOSIS — O34.10 UTERINE FIBROID DURING PREGNANCY, ANTEPARTUM: ICD-10-CM

## 2024-12-16 DIAGNOSIS — Z3A.32 32 WEEKS GESTATION OF PREGNANCY: ICD-10-CM

## 2024-12-16 PROBLEM — N92.0 HEAVY MENSES: Status: RESOLVED | Noted: 2023-11-13 | Resolved: 2024-12-16

## 2024-12-16 PROCEDURE — 90471 IMMUNIZATION ADMIN: CPT | Performed by: OBSTETRICS & GYNECOLOGY

## 2024-12-16 PROCEDURE — PNV: Performed by: OBSTETRICS & GYNECOLOGY

## 2024-12-16 PROCEDURE — 90678 RSV VACC PREF BIVALENT IM: CPT | Performed by: OBSTETRICS & GYNECOLOGY

## 2024-12-16 PROCEDURE — 90656 IIV3 VACC NO PRSV 0.5 ML IM: CPT | Performed by: OBSTETRICS & GYNECOLOGY

## 2024-12-16 PROCEDURE — 90472 IMMUNIZATION ADMIN EACH ADD: CPT | Performed by: OBSTETRICS & GYNECOLOGY

## 2024-12-16 NOTE — PROGRESS NOTES
OB/GYN  PN Visit  Jessica Alejandro  [id number]  2024  8:46 AM  Dr. Amanda Amador MD    S: 34 y.o.  32w1d here for PN visit. She denies contractions. She denies leakage of fluid and vaginal bleeding. She reports good fetal movement. She denies nausea, vomiting, headache, cramping, domestic violence, and smoking. She reports mild edema of her legs. Her pregnancy is complicated by A1GDM, AMA, and request for sterilization.     O:  Pre- Vitals      Flowsheet Row Most Recent Value   Prenatal Assessment    Fetal Heart Rate 154   Fundal Height (cm) 32 cm   Movement Present   Prenatal Vitals    Blood Pressure 102/68   Weight - Scale 88 kg (194 lb)   Urine Albumin/Glucose    Dilation/Effacement/Station    Vaginal Drainage    Draining Fluid No   Edema    LLE Edema None   RLE Edema None          Physical Exam  Vitals reviewed.   Constitutional:       General: She is not in acute distress.     Appearance: Normal appearance. She is well-developed. She is not ill-appearing, toxic-appearing or diaphoretic.   Cardiovascular:      Rate and Rhythm: Normal rate.   Pulmonary:      Effort: Pulmonary effort is normal. No respiratory distress.   Abdominal:      General: There is no distension.      Palpations: Abdomen is soft. There is no mass.      Tenderness: There is no abdominal tenderness. There is no guarding or rebound.   Genitourinary:     Comments: Gravid, nontender  Skin:     General: Skin is warm and dry.   Neurological:      Mental Status: She is alert and oriented to person, place, and time.   Psychiatric:         Mood and Affect: Mood normal.         Behavior: Behavior normal.         A/P:    Assessment & Plan  32 weeks gestation of pregnancy  - Continue PNV  - Labor precautions reviewed  - Fetal kick counts reviewed  - Labs: UTD  - Ultrasounds: Most recent US wnl on 12/10/24 (EFW 48%); Next growth scan scheduled for   - Tdap: Done 24  - Flu Shot: Administered today  - RSV: Administered today    - COVID: Unvaccinated  - Rhogam: N/A  - Delivery:  without epidural  - Contraception: Tubal if C/S, otherwise, vasectomy  - Breastfeeding: Formula  - Pediatrician: Established   - RTO in 2 weeks       AMA (advanced maternal age) multigravida 35+, third trimester         Diet controlled gestational diabetes mellitus (GDM) in third trimester  Continue to follow with diabetes education  If 20% or more FBS are >95 or 2 hr pp are >120 she should be started on insulin or a oral hypoglycemic such as metformin or glyburide.   If medications are required to control her diabetes, she will require twice weekly fetal testing with NST's starting at 32 weeks. Delivery would be recommended around her due date.  If she does not require any medications to control her diabetes then she can start fetal testing at 40 weeks and be delivered by 41 weeks. Patient prefer to avoid IOL if possible  Postnatally after delivery, most patients with gestational diabetes can stop their insulin and gestational diabetes diet. Recommend she complete a 2 hour glucose tolerance test at 6 weeks postpartum  Patients with gestational diabetes have a higher risk for developing overt diabetes in the future.  Recommend she be screened for diabetes yearly.    Lab Results   Component Value Date    HGBA1C 4.7 2023            Family planning  MA 31 signed  Planning tubal if C/S  Otherwise, vasectomy       H/O gestational diabetes in prior pregnancy, currently pregnant  Diagnosed with A1GDM this pregnancy       Uterine fibroid during pregnancy, antepartum  Intramural myometrium fibroid located in the left posterior corpus region, measuring 4.4 x 6.7 x 4.8cm with a volume of 74.3cc. Color doppler flow was not increased. The appearance was heterogeneous.         Future Appointments   Date Time Provider Department Center   2024  8:00 AM CHINMAY Dorado [facility]   2025 12:45 PM  [facility]    1/14/2025  9:00 AM CHINMAY Dorado [facility]   1/21/2025  8:30 AM Radha Cruz MD [facility]   1/31/2025  9:30 AM [facility]INMA[facility] Wahl [facility]   2/5/2025  9:00 AM MD MARILIA White [facility]   2/10/2025  8:30 AM Roxi Davila MD [facility]       Amanda Amador MD  12/16/2024  8:46 AM

## 2024-12-16 NOTE — ASSESSMENT & PLAN NOTE
Continue to follow with diabetes education  If 20% or more FBS are >95 or 2 hr pp are >120 she should be started on insulin or a oral hypoglycemic such as metformin or glyburide.   If medications are required to control her diabetes, she will require twice weekly fetal testing with NST's starting at 32 weeks. Delivery would be recommended around her due date.  If she does not require any medications to control her diabetes then she can start fetal testing at 40 weeks and be delivered by 41 weeks. Patient prefer to avoid IOL if possible  Postnatally after delivery, most patients with gestational diabetes can stop their insulin and gestational diabetes diet. Recommend she complete a 2 hour glucose tolerance test at 6 weeks postpartum  Patients with gestational diabetes have a higher risk for developing overt diabetes in the future.  Recommend she be screened for diabetes yearly.    Lab Results   Component Value Date    HGBA1C 4.7 03/25/2023

## 2024-12-16 NOTE — ASSESSMENT & PLAN NOTE
- Continue PNV  - Labor precautions reviewed  - Fetal kick counts reviewed  - Labs: UTD  - Ultrasounds: Most recent US wnl on 12/10/24 (EFW 48%); Next growth scan scheduled for   - Tdap: Done 24  - Flu Shot: Administered today  - RSV: Administered today   - COVID: Unvaccinated  - Rhogam: N/A  - Delivery:  without epidural  - Contraception: Tubal if C/S, otherwise, vasectomy  - Breastfeeding: Formula  - Pediatrician: Established   - RTO in 2 weeks

## 2024-12-17 ENCOUNTER — DOCUMENTATION (OUTPATIENT)
Dept: PERINATAL CARE | Facility: CLINIC | Age: 34
End: 2024-12-17

## 2024-12-17 NOTE — PROGRESS NOTES
"  Date: 12/17/24  Jessica Alejandro  1990  Estimated Date of Delivery: 2/9/25  32w2d  OB/GYN:[facility]    Diagnosis:  Diet controlled GDM    Blood Sugar Logs Submitted via: Glucose Flowsheet        Assessment and Plan:  No Diabetes Medications  2200 calorie (CHO:72-12-38-30-60-30) (PRO: 3-2-3/4-2-3/4-2) meal plan consisting of 3 meals and 3 snacks daily including protein at each. Has celiac disease & avoids with gluten.  Ok'd snack during middle of the night with 1 oz protein.   Advised patient to continue current meal plan  Avoid fasting for > 10 hours overnight  Continue SMBG 4 x per day (Fasting, 2 hour after start of each meal)  If okay by physician, recommend up to 30 minutes of physical activity daily Advised to waslk after dinner.     Lab Work:   Lab Results   Component Value Date    HGBA1C 4.7 03/25/2023      Ultrasound:       Date:12/10/24       Fetal Growth: Normal       JADYN: Normal  Next: 1/7/24    Diabetes Self Management Support Plan outside of ongoing care: Spouse/Family   Patient Stated Goal: \"I will report my blood sugar values to the diabetes educators each week\"   Goal Assessment: On track    Date to report next: weekly    Mindy Wall, MS, RD, [facility]  Diabetes Educator  [facility] Maternal Fetal Medicine  Diabetes in Pregnancy Program  [address], [address]  [place] [address]    "

## 2024-12-30 PROBLEM — Z3A.34 34 WEEKS GESTATION OF PREGNANCY: Status: ACTIVE | Noted: 2024-08-28

## 2024-12-30 NOTE — ASSESSMENT & PLAN NOTE
Continue to follow with diabetes education  If 20% or more FBS are >95 or 2 hr pp are >120 she should be started on insulin or a oral hypoglycemic such as metformin or glyburide.   If medications are required to control her diabetes, she will require twice weekly fetal testing with NST's starting at 32 weeks. Delivery would be recommended around her due date.  If she does not require any medications to control her diabetes then she can start fetal testing at 40 weeks and be delivered by 41 weeks. Patient prefer to avoid IOL if possible  Postnatally after delivery, most patients with gestational diabetes can stop their insulin and gestational diabetes diet. Recommend she complete a 2 hour glucose tolerance test at 6 weeks postpartum  Patients with gestational diabetes have a higher risk for developing overt diabetes in the future.  Recommend she be screened for diabetes yearly.    Lab Results   Component Value Date    HGBA1C 4.7 03/25/2023       SmartLink not supported outside of the Encounter Diagnoses SmartSection.

## 2024-12-30 NOTE — PROGRESS NOTES
OB/GYN  PN Visit  Jessica Alejandro  [id number]  2024  8:05 AM  [address] CHINMAY Rodríguez    S: 34 y.o.  34w2d here for PN visit.     OB complaints:  Denies c/o  v/ha, no edema, no smoking, no DV.   No vb/lof  No cramping/ctxns or signs of PTL.    She endorses good fetal movement. She reports car mai ctxns. She reports feeling tired and mild nausea. She reports heartburn that is worst at night.    O:    Pre- Vitals      Flowsheet Row Most Recent Value   Prenatal Assessment    Fetal Heart Rate 145   Fundal Height (cm) 35 cm   Movement Present   Prenatal Vitals    Blood Pressure 102/64   Weight - Scale 90.3 kg (199 lb)   Urine Albumin/Glucose    Dilation/Effacement/Station    Vaginal Drainage    Edema               Gen: no acute distress, nonlabored breathing.  OB exam completed: fundal height, +FHT.  Urine: -/-    A/P:  Problem List Items Addressed This Visit       AMA (advanced maternal age) multigravida 35+, third trimester    34 weeks gestation of pregnancy - Primary    - Continue PNV  - Labor precautions reviewed  - Fetal kick counts reviewed  - Labs: UTD; Failed 3 hr- GDM  - Ultrasounds: Most recent US wnl on 12/10/24 (EFW 48%); Next growth scan scheduled for   - Tdap: Done 24  - Flu Shot:   - RSV:   - COVID: Unvaccinated  - Rhogam: N/A  - Delivery:  without epidural  - Contraception: Tubal if C/S, otherwise, vasectomy  - Breastfeeding: Formula  - Pediatrician: Established   -Delivery consent signed.  -GBS: next appt  - RTO in 2 weeks             Diet controlled gestational diabetes mellitus (GDM) in third trimester    Continue to follow with diabetes education  If 20% or more FBS are >95 or 2 hr pp are >120 she should be started on insulin or a oral hypoglycemic such as metformin or glyburide.   If medications are required to control her diabetes, she will require twice weekly fetal testing with NST's starting at 32 weeks. Delivery would be recommended around her due  date.  If she does not require any medications to control her diabetes then she can start fetal testing at 40 weeks and be delivered by 41 weeks. Patient prefer to avoid IOL if possible  Postnatally after delivery, most patients with gestational diabetes can stop their insulin and gestational diabetes diet. Recommend she complete a 2 hour glucose tolerance test at 6 weeks postpartum  Patients with gestational diabetes have a higher risk for developing overt diabetes in the future.  Recommend she be screened for diabetes yearly.    Lab Results   Component Value Date    HGBA1C 4.7 03/25/2023       SmartLink not supported outside of the Encounter Diagnoses SmartSection.            Other Visit Diagnoses         Heartburn in pregnancy in third trimester        Relevant Medications    famotidine (PEPCID) 20 mg tablet            CHINMAY Dorado  12/31/2024  8:05 AM

## 2024-12-30 NOTE — ASSESSMENT & PLAN NOTE
- Continue PNV  - Labor precautions reviewed  - Fetal kick counts reviewed  - Labs: UTD; Failed 3 hr- GDM  - Ultrasounds: Most recent US wnl on 12/10/24 (EFW 48%); Next growth scan scheduled for   - Tdap: Done 24  - Flu Shot:   - RSV:   - COVID: Unvaccinated  - Rhogam: N/A  - Delivery:  without epidural  - Contraception: Tubal if C/S, otherwise, vasectomy  - Breastfeeding: Formula  - Pediatrician: Established   -Delivery consent signed.  -GBS: next appt  - RTO in 2 weeks

## 2024-12-31 ENCOUNTER — ROUTINE PRENATAL (OUTPATIENT)
Dept: OBGYN CLINIC | Facility: CLINIC | Age: 34
End: 2024-12-31

## 2024-12-31 VITALS
DIASTOLIC BLOOD PRESSURE: 64 MMHG | SYSTOLIC BLOOD PRESSURE: 102 MMHG | BODY MASS INDEX: 33.15 KG/M2 | HEIGHT: 65 IN | WEIGHT: 199 LBS

## 2024-12-31 DIAGNOSIS — Z3A.34 34 WEEKS GESTATION OF PREGNANCY: Primary | ICD-10-CM

## 2024-12-31 DIAGNOSIS — O09.523 AMA (ADVANCED MATERNAL AGE) MULTIGRAVIDA 35+, THIRD TRIMESTER: ICD-10-CM

## 2024-12-31 DIAGNOSIS — R12 HEARTBURN IN PREGNANCY IN THIRD TRIMESTER: ICD-10-CM

## 2024-12-31 DIAGNOSIS — O26.893 HEARTBURN IN PREGNANCY IN THIRD TRIMESTER: ICD-10-CM

## 2024-12-31 DIAGNOSIS — O24.410 DIET CONTROLLED GESTATIONAL DIABETES MELLITUS (GDM) IN THIRD TRIMESTER: ICD-10-CM

## 2024-12-31 PROCEDURE — PNV

## 2024-12-31 RX ORDER — FAMOTIDINE 20 MG/1
20 TABLET, FILM COATED ORAL DAILY
Qty: 60 TABLET | Refills: 0 | Status: SHIPPED | OUTPATIENT
Start: 2024-12-31

## 2025-01-07 ENCOUNTER — DOCUMENTATION (OUTPATIENT)
Dept: PERINATAL CARE | Facility: CLINIC | Age: 35
End: 2025-01-07

## 2025-01-07 ENCOUNTER — ULTRASOUND (OUTPATIENT)
Facility: HOSPITAL | Age: 35
End: 2025-01-07
Payer: COMMERCIAL

## 2025-01-07 VITALS
BODY MASS INDEX: 32.99 KG/M2 | HEIGHT: 65 IN | DIASTOLIC BLOOD PRESSURE: 60 MMHG | SYSTOLIC BLOOD PRESSURE: 100 MMHG | WEIGHT: 198 LBS | HEART RATE: 85 BPM

## 2025-01-07 DIAGNOSIS — Z3A.35 35 WEEKS GESTATION OF PREGNANCY: ICD-10-CM

## 2025-01-07 DIAGNOSIS — O24.410 DIET CONTROLLED GESTATIONAL DIABETES MELLITUS (GDM) IN THIRD TRIMESTER: Primary | ICD-10-CM

## 2025-01-07 PROCEDURE — 76816 OB US FOLLOW-UP PER FETUS: CPT | Performed by: OBSTETRICS & GYNECOLOGY

## 2025-01-07 PROCEDURE — 99213 OFFICE O/P EST LOW 20 MIN: CPT | Performed by: OBSTETRICS & GYNECOLOGY

## 2025-01-07 NOTE — PROGRESS NOTES
"  Date: 01/07/25  Jessica Alejandro  1990  Estimated Date of Delivery: 2/9/25  35w2d  OB/GYN:[facility]    Diagnosis:  Diet controlled GDM    Blood Sugar Logs Submitted via: Glucose Flowsheet                Assessment and Plan:  No Diabetes Medications  2200 calorie (CHO:43-81-50-30-60-30) (PRO: 3-2-3/4-2-3/4-2) meal plan consisting of 3 meals and 3 snacks daily including protein at each. Has celiac disease & avoids foods with gluten.  Ok'd snack during middle of the night with 1 oz protein.   Advised patient to continue current meal plan  Avoid fasting for > 10 hours overnight  Continue SMBG 4 x per day (Fasting, 2 hour after start of each meal)  If okay by physician, recommend up to 30 minutes of physical activity daily Advised to waslk after dinner.     Lab Work:   Lab Results   Component Value Date    HGBA1C 4.7 03/25/2023      Ultrasound:       Date:12/10/24       Fetal Growth: Normal       JADYN: Normal  Next: 1/7/24--not available yet for today    Diabetes Self Management Support Plan outside of ongoing care: Spouse/Family   Patient Stated Goal: \"I will report my blood sugar values to the diabetes educators each week\"   Goal Assessment: On track    Date to report next: weekly    Mindy Wall MS, RD, [facility]  Diabetes Educator  [facility] Maternal Fetal Medicine  Diabetes in Pregnancy Program  [address], [address]  [place] [address]    "

## 2025-01-09 ENCOUNTER — TELEPHONE (OUTPATIENT)
Dept: FAMILY MEDICINE CLINIC | Facility: CLINIC | Age: 35
End: 2025-01-09

## 2025-01-10 PROBLEM — Z34.93 PRENATAL CARE IN THIRD TRIMESTER: Status: ACTIVE | Noted: 2025-01-10

## 2025-01-10 NOTE — ASSESSMENT & PLAN NOTE
- Continue PNV  - Labor precautions reviewed  - Fetal kick counts reviewed  - Labs: UTD; Failed 3 hr- GDM  - Ultrasounds:  US at 35w2d: EFW Hadlock 4 2610 grams - 5 lbs 12 oz (44%)   - Tdap: Done 24  - Flu Shot:   - RSV:   - COVID: Unvaccinated  - Rhogam: N/A  - Delivery:  without epidural  - Contraception: Tubal if C/S, otherwise, vasectomy  - Breastfeeding: Formula  - Pediatrician: Established   -Delivery consent signed.  -GBS: collected at the hospital  - RTO in 2 weeks

## 2025-01-10 NOTE — PROGRESS NOTES
OB/GYN  PN Visit  Jessica Alejandro  [id number]  2025  9:13 AM  CHINMAY Dorado    S: 35 y.o.  36w2d here for PN visit.     OB complaints:  Denies c/o n/v/ha, no edema, no smoking, no DV.   No vb/lof  No cramping/ctxns or signs of PTL.    She reports car mai; was at L&D on  for ctxns, but sent home. She reports the pain she was experiencing a sharp pain at her upper abdomen and transitioned down her abdomen. Her sharp pains and ctxns have resolved since discharge. Endorses good fetal movement.    O:    Pre-Maria R Vitals      Flowsheet Row Most Recent Value   Prenatal Assessment    Fetal Heart Rate 150   Fundal Height (cm) 36 cm   Movement Present   Prenatal Vitals    Blood Pressure 102/70   Weight - Scale 91.2 kg (201 lb)   Urine Albumin/Glucose    Dilation/Effacement/Station    Cervical Dilation 1.5   Cervical Effacement 50   Fetal Station -4   Vaginal Drainage    Edema               Gen: no acute distress, nonlabored breathing.  OB exam completed: fundal height, +FHT.  Urine: -/-    A/P:  Problem List Items Addressed This Visit       AMA (advanced maternal age) multigravida 35+, third trimester    Diet controlled gestational diabetes mellitus (GDM) in third trimester - Primary    Continue to follow with diabetes education  If 20% or more FBS are >95 or 2 hr pp are >120 she should be started on insulin or a oral hypoglycemic such as metformin or glyburide.   If medications are required to control her diabetes, she will require twice weekly fetal testing with NST's starting at 32 weeks. Delivery would be recommended around her due date.  If she does not require any medications to control her diabetes then she can start fetal testing at 40 weeks and be delivered by 41 weeks. Patient prefer to avoid IOL if possible  Postnatally after delivery, most patients with gestational diabetes can stop their insulin and gestational diabetes diet. Recommend she complete a 2 hour glucose tolerance test at 6  weeks postpartum  Patients with gestational diabetes have a higher risk for developing overt diabetes in the future.  Recommend she be screened for diabetes yearly.    Lab Results   Component Value Date    HGBA1C 4.7 2023       SmartLink not supported outside of the Encounter Diagnoses SmartSection.           Family planning    Prenatal care in third trimester    - Continue PNV  - Labor precautions reviewed  - Fetal kick counts reviewed  - Labs: UTD; Failed 3 hr- GDM  - Ultrasounds:  US at 35w2d: EFW Hadlock 4 2610 grams - 5 lbs 12 oz (44%)   - Tdap: Done 24  - Flu Shot:   - RSV:   - COVID: Unvaccinated  - Rhogam: N/A  - Delivery:  without epidural  - Contraception: Tubal if C/S, otherwise, vasectomy  - Breastfeeding: Formula  - Pediatrician: Established   -Delivery consent signed.  -GBS: collected at the hospital  - RTO in 2 weeks              CHINMAY Dorado  2025  9:13 AM

## 2025-01-12 ENCOUNTER — NURSE TRIAGE (OUTPATIENT)
Dept: OTHER | Facility: OTHER | Age: 35
End: 2025-01-12

## 2025-01-12 ENCOUNTER — HOSPITAL ENCOUNTER (OUTPATIENT)
Facility: HOSPITAL | Age: 35
Discharge: HOME | End: 2025-01-12
Attending: STUDENT IN AN ORGANIZED HEALTH CARE EDUCATION/TRAINING PROGRAM | Admitting: STUDENT IN AN ORGANIZED HEALTH CARE EDUCATION/TRAINING PROGRAM
Payer: COMMERCIAL

## 2025-01-12 VITALS
OXYGEN SATURATION: 96 % | WEIGHT: 198 LBS | BODY MASS INDEX: 32.99 KG/M2 | RESPIRATION RATE: 20 BRPM | HEART RATE: 91 BPM | HEIGHT: 65 IN | TEMPERATURE: 98.2 F | DIASTOLIC BLOOD PRESSURE: 56 MMHG | SYSTOLIC BLOOD PRESSURE: 102 MMHG

## 2025-01-12 PROBLEM — O47.9 UTERINE CONTRACTIONS: Status: ACTIVE | Noted: 2025-01-12

## 2025-01-12 LAB
ALBUMIN SERPL BCG-MCNC: 3.3 G/DL (ref 3.5–5)
ALP SERPL-CCNC: 97 U/L (ref 34–104)
ALT SERPL W P-5'-P-CCNC: 17 U/L (ref 7–52)
AMYLASE SERPL-CCNC: 53 IU/L (ref 29–103)
ANION GAP SERPL CALCULATED.3IONS-SCNC: 6 MMOL/L (ref 4–13)
APTT PPP: 21 SECONDS (ref 23–34)
AST SERPL W P-5'-P-CCNC: 15 U/L (ref 13–39)
BILIRUB SERPL-MCNC: 0.58 MG/DL (ref 0.2–1)
BUN SERPL-MCNC: 9 MG/DL (ref 5–25)
CALCIUM ALBUM COR SERPL-MCNC: 8.6 MG/DL (ref 8.3–10.1)
CALCIUM SERPL-MCNC: 8 MG/DL (ref 8.4–10.2)
CHLORIDE SERPL-SCNC: 107 MMOL/L (ref 96–108)
CO2 SERPL-SCNC: 24 MMOL/L (ref 21–32)
CREAT SERPL-MCNC: 0.54 MG/DL (ref 0.6–1.3)
ERYTHROCYTE [DISTWIDTH] IN BLOOD BY AUTOMATED COUNT: 13.2 % (ref 11.6–15.1)
FIBRINOGEN PPP-MCNC: 391 MG/DL (ref 206–523)
GFR SERPL CREATININE-BSD FRML MDRD: 122 ML/MIN/1.73SQ M
GLUCOSE P FAST SERPL-MCNC: 93 MG/DL (ref 65–99)
GLUCOSE SERPL-MCNC: 93 MG/DL (ref 65–140)
HCT VFR BLD AUTO: 36.1 % (ref 34.8–46.1)
HGB BLD-MCNC: 12.3 G/DL (ref 11.5–15.4)
INR PPP: 0.88 (ref 0.85–1.19)
LIPASE SERPL-CCNC: 14 U/L (ref 11–82)
MCH RBC QN AUTO: 30.2 PG (ref 26.8–34.3)
MCHC RBC AUTO-ENTMCNC: 34.1 G/DL (ref 31.4–37.4)
MCV RBC AUTO: 89 FL (ref 82–98)
PLATELET # BLD AUTO: 269 THOUSANDS/UL (ref 149–390)
PMV BLD AUTO: 9.4 FL (ref 8.9–12.7)
POTASSIUM SERPL-SCNC: 3.8 MMOL/L (ref 3.5–5.3)
PROT SERPL-MCNC: 6.2 G/DL (ref 6.4–8.4)
PROTHROMBIN TIME: 12.7 SECONDS (ref 12.3–15)
RBC # BLD AUTO: 4.07 MILLION/UL (ref 3.81–5.12)
SODIUM SERPL-SCNC: 137 MMOL/L (ref 135–147)
WBC # BLD AUTO: 10.23 THOUSAND/UL (ref 4.31–10.16)

## 2025-01-12 PROCEDURE — 80053 COMPREHEN METABOLIC PANEL: CPT | Performed by: STUDENT IN AN ORGANIZED HEALTH CARE EDUCATION/TRAINING PROGRAM

## 2025-01-12 PROCEDURE — 96375 TX/PRO/DX INJ NEW DRUG ADDON: CPT

## 2025-01-12 PROCEDURE — NC001 PR NO CHARGE: Performed by: STUDENT IN AN ORGANIZED HEALTH CARE EDUCATION/TRAINING PROGRAM

## 2025-01-12 PROCEDURE — 82150 ASSAY OF AMYLASE: CPT | Performed by: STUDENT IN AN ORGANIZED HEALTH CARE EDUCATION/TRAINING PROGRAM

## 2025-01-12 PROCEDURE — 85384 FIBRINOGEN ACTIVITY: CPT | Performed by: STUDENT IN AN ORGANIZED HEALTH CARE EDUCATION/TRAINING PROGRAM

## 2025-01-12 PROCEDURE — 87150 DNA/RNA AMPLIFIED PROBE: CPT | Performed by: STUDENT IN AN ORGANIZED HEALTH CARE EDUCATION/TRAINING PROGRAM

## 2025-01-12 PROCEDURE — 96374 THER/PROPH/DIAG INJ IV PUSH: CPT

## 2025-01-12 PROCEDURE — 85730 THROMBOPLASTIN TIME PARTIAL: CPT | Performed by: STUDENT IN AN ORGANIZED HEALTH CARE EDUCATION/TRAINING PROGRAM

## 2025-01-12 PROCEDURE — 83690 ASSAY OF LIPASE: CPT | Performed by: STUDENT IN AN ORGANIZED HEALTH CARE EDUCATION/TRAINING PROGRAM

## 2025-01-12 PROCEDURE — 96360 HYDRATION IV INFUSION INIT: CPT

## 2025-01-12 PROCEDURE — 99214 OFFICE O/P EST MOD 30 MIN: CPT

## 2025-01-12 PROCEDURE — 85027 COMPLETE CBC AUTOMATED: CPT | Performed by: STUDENT IN AN ORGANIZED HEALTH CARE EDUCATION/TRAINING PROGRAM

## 2025-01-12 PROCEDURE — 85610 PROTHROMBIN TIME: CPT | Performed by: STUDENT IN AN ORGANIZED HEALTH CARE EDUCATION/TRAINING PROGRAM

## 2025-01-12 RX ORDER — ONDANSETRON 2 MG/ML
4 INJECTION INTRAMUSCULAR; INTRAVENOUS ONCE
Status: COMPLETED | OUTPATIENT
Start: 2025-01-12 | End: 2025-01-12

## 2025-01-12 RX ORDER — FAMOTIDINE 10 MG/ML
20 INJECTION, SOLUTION INTRAVENOUS ONCE
Status: COMPLETED | OUTPATIENT
Start: 2025-01-12 | End: 2025-01-12

## 2025-01-12 RX ADMIN — SODIUM CHLORIDE, SODIUM LACTATE, POTASSIUM CHLORIDE, AND CALCIUM CHLORIDE 1000 ML: .6; .31; .03; .02 INJECTION, SOLUTION INTRAVENOUS at 13:41

## 2025-01-12 RX ADMIN — ONDANSETRON 4 MG: 2 INJECTION INTRAMUSCULAR; INTRAVENOUS at 13:50

## 2025-01-12 RX ADMIN — FAMOTIDINE 20 MG: 10 INJECTION INTRAVENOUS at 13:50

## 2025-01-12 NOTE — DISCHARGE INSTRUCTIONS
Kick Counts in Pregnancy   WHAT YOU NEED TO KNOW:   Kick counts measure how much your baby is moving in your womb. A kick from your baby can be felt as a twist, turn, swish, roll, or jab. It is common to feel your baby kicking at 26 to 28 weeks of pregnancy. You may feel your baby kick as early as 20 weeks of pregnancy. You may want to start counting at 28 weeks.   DISCHARGE INSTRUCTIONS:   Contact your healthcare provider immediately if:   You feel a change in the number of kicks or movements of your baby.      You feel fewer than 10 kicks within 2 hours.      You have questions or concerns about your baby's movements.     Why measure kick counts: Your baby's movement may provide information about your baby's health. He or she may move less, or not at all, if there are problems. Your baby may move less if he or she is not getting enough oxygen or nutrition from the placenta. Do not smoke while you are pregnant. Smoking decreases the amount of oxygen that gets to your baby. Talk to your healthcare provider if you need help to quit smoking. Tell your healthcare provider as soon as you feel a change in your baby's movements.  When to measure kick counts:   Measure kick counts at the same time every day.      Measure kick counts when your baby is awake and most active. Your baby may be most active in the evening.     How to measure kick counts: Check that your baby is awake before you measure kick counts. You can wake up your baby by lightly pushing on your belly, walking, or drinking something cold. Your healthcare provider may tell you different ways to measure kick counts. You may be told to do the following:  Use a chart or clock to keep track of the time you start and finish counting.      Sit in a chair or lie on your left side.      Place your hands on the largest part of your belly.      Count until you reach 10 kicks. Write down how much time it takes to count 10 kicks.      It may take 30 minutes to 2 hours  to count 10 kicks. It should not take more than 2 hours to count 10 kicks.     Follow up with your healthcare provider as directed: Write down your questions so you remember to ask them during your visits.   © Copyright alike 2020 Information is for End User's use only and may not be sold, redistributed or otherwise used for commercial purposes. All illustrations and images included in CareNotes® are the copyrighted property of [employer], [employer] or [employer]  The above information is an  only. It is not intended as medical advice for individual conditions or treatments. Talk to your doctor, nurse or pharmacist before following any medical regimen to see if it is safe and effective for you.

## 2025-01-12 NOTE — PROGRESS NOTES
"L&D Triage Note - OB/GYN  Jessica Alejandro 34 y.o. female MRN: [id number]  Unit/Bed#:  TRIAGE  Encounter: [id number]      ASSESSMENT:    Jessica Alejandro is a 34 y.o.  at 36w0d who was evaluated today in triage due to abdominal pain. SVE unchanged on 2 hour recheck. Reassuring work up, the patient does not appear to be in  labor and it is safe to discharge home.     PLAN:    1) Abdominal pain   - SVE: 1.5/20/-4, unchanged on 2h recheck   - CBC/CMP wnl  - PT, fibrinogen wnl, PTT low at 21  - amylase, lipase wnl   - no evidence of placental abruption visualized on bedside TAUS    2) 36 weeks gestation of pregnancy  - Continue routine prenatal care  - Discharge from OB triage with  labor precautions    - Reviewed rupture of membranes, false vs true labor, decreased fetal movement, and vaginal bleeding   - Pt to call provider with any concerns and follow up at her next scheduled prenatal appointment 2025   - Case discussed with Dr. Araujo    SUBJECTIVE:    Jessica Alejandro 34 y.o.  at 36w0d with an Estimated Date of Delivery: 25 presenting with abdominal pain. She has had pain since this morning; pain is intermittent and feels \"sharp at the end of a contraction\" mostly in her upper abdomen with radiation down her sides. Denies dysuria.       Her past obstetrical history is significant for SVDx3. This pregnancy has been notable for A1GDM, AMA.    Contractions: yes  Leakage of fluid: Denies  Vaginal Bleeding: Denies  Fetal movement: present    OBJECTIVE:    Vitals:    25 1413   BP: 102/56   Pulse: 91   Resp:    Temp:    SpO2:        ROS:  Constitutional: Negative  Respiratory: Negative  Cardiovascular: Negative    Gastrointestinal: Negative    General Physical Exam:  General: Well appearing, no distress  Respiratory: Unlabored breathing  Cardiovascular: Regular rate.  Abdomen: Soft, gravid, nontender  Fundal Height: Appropriate for gestational age.  Extremities: Warm and well " perfused.  Non tender.      Fetal monitoring:  Fetal heart rate: Baseline Rate (FHR): 145 bpm  Variability: Moderate  Accelerations: 15 x 15 or greater, At variable times  Decelerations: None  FHR Category: Category I  [place]: Contraction Frequency (minutes): 1.5-2.5  Contraction Intensity: Mild/Moderate      Cervical Exam  SVE: 1.5/20/-4, unchanged on 2h recheck       Urine Dip    - trace leukocytes      Sweta Simmons MD  OBGYN, PGY-I  01/12/25  3:16 PM

## 2025-01-12 NOTE — TELEPHONE ENCOUNTER
"Reason for Disposition   [1] MILD abdominal pain (e.g., doesn't interfere with normal activities) or tightening AND [2] constant AND [3] present > 2 hours    Answer Assessment - Initial Assessment Questions  1. LOCATION: \"Where does it hurt?\"         Upper abdomen and down the sides     2. RADIATION: \"Does the pain shoot anywhere else?\" (e.g., chest, back)        Denies     3. ONSET: \"When did the pain begin?\" (Minutes, hours or days ago)         Around 7AM this morning     4. SUDDEN: \"Gradual or sudden onset?\"        Sudden at 7A, getting worse since    5. PATTERN: \"Does the pain come and go, or has it been constant since it started?\"         Comes and goes but uncomfortable in between     6. SEVERITY: \"How bad is the pain?\" \"What does it keep you from doing?\"  (e.g., Scale 1-10; mild, moderate, or severe)        When it's super sharp about 7-8/10 feels like a stabbing pain     7. RECURRENT SYMPTOM: \"Have you ever had this type of stomach pain before?\" If Yes, ask: \"When was the last time?\" and \"What happened that time?\"         Denies     8. CAUSE: \"What do you think is causing the stomach pain?        Walked more than usual yesterday, but unsure if caused pain     9. RELIEVING/AGGRAVATING FACTORS: \"What makes it better or worse?\" (e.g., antacids, bowel movement, movement)        Denies     10. FETAL MOVEMENT: \"Has the baby's movement decreased or changed significantly from normal?\"          Moving but different than usual     11. OTHER SYMPTOMS: \"Do you have any other symptoms?\" (e.g., back pain, contractions, diarrhea, fever, headache, urination pain, vaginal bleeding, vaginal discharge, vomiting)          Nausea, took reglan but did not help, denies other symptoms     12. KOFFI: \"What date are you expecting to deliver?\"          2/9/25    Protocols used: Pregnancy - Abdominal Pain Greater Than 20 Weeks EGA-Adult-AH    "

## 2025-01-14 ENCOUNTER — DOCUMENTATION (OUTPATIENT)
Dept: PERINATAL CARE | Facility: CLINIC | Age: 35
End: 2025-01-14

## 2025-01-14 ENCOUNTER — ROUTINE PRENATAL (OUTPATIENT)
Dept: OBGYN CLINIC | Facility: CLINIC | Age: 35
End: 2025-01-14

## 2025-01-14 VITALS
SYSTOLIC BLOOD PRESSURE: 102 MMHG | DIASTOLIC BLOOD PRESSURE: 70 MMHG | HEIGHT: 65 IN | BODY MASS INDEX: 33.49 KG/M2 | WEIGHT: 201 LBS

## 2025-01-14 DIAGNOSIS — Z34.93 PRENATAL CARE IN THIRD TRIMESTER: ICD-10-CM

## 2025-01-14 DIAGNOSIS — O09.523 AMA (ADVANCED MATERNAL AGE) MULTIGRAVIDA 35+, THIRD TRIMESTER: ICD-10-CM

## 2025-01-14 DIAGNOSIS — Z30.09 FAMILY PLANNING: ICD-10-CM

## 2025-01-14 DIAGNOSIS — O24.410 DIET CONTROLLED GESTATIONAL DIABETES MELLITUS (GDM) IN THIRD TRIMESTER: Primary | ICD-10-CM

## 2025-01-14 LAB — GP B STREP DNA SPEC QL NAA+PROBE: NEGATIVE

## 2025-01-14 PROCEDURE — PNV

## 2025-01-14 NOTE — PROGRESS NOTES
"  Date: 01/14/25  Jessica Alejandro  1990  Estimated Date of Delivery: 2/9/25  36w2d  OB/GYN:[facility]    Diagnosis:  Diet controlled GDM    Blood Sugar Logs Submitted via: Glucose Flowsheet          Assessment and Plan:  No Diabetes Medications  2200 calorie (CHO:[id number]) (PRO: 3-2-3/4-2-3/4-2) meal plan consisting of 3 meals and 3 snacks daily including protein at each. Has celiac disease & avoids foods with gluten.  Ok'd snack during middle of the night with 1 oz protein.   Advised patient to continue current meal plan  Avoid fasting for > 10 hours overnight  Continue SMBG 4 x per day (Fasting, 2 hour after start of each meal)  If okay by physician, recommend up to 30 minutes of physical activity daily Advised to waslk after dinner.     Lab Work:   Lab Results   Component Value Date    HGBA1C 4.7 03/25/2023      Ultrasound:       Date:1/7/25       Fetal Growth: Normal       JADYN: Normal  Next: none    Diabetes Self Management Support Plan outside of ongoing care: Spouse/Family   Patient Stated Goal: \"I will report my blood sugar values to the diabetes educators each week\"   Goal Assessment: On track    Date to report next: weekly    Mindy Wall, MS, RD, [facility]  Diabetes Educator  [facility]aternal [facility]etal [facility]  Diabetes in Pregnancy Program  [address], [address]  [place] [address]    "

## 2025-01-15 ENCOUNTER — RESULTS FOLLOW-UP (OUTPATIENT)
Dept: OTHER | Facility: HOSPITAL | Age: 35
End: 2025-01-15

## 2025-01-16 ENCOUNTER — NURSE TRIAGE (OUTPATIENT)
Age: 35
End: 2025-01-16

## 2025-01-16 ENCOUNTER — HOSPITAL ENCOUNTER (OUTPATIENT)
Facility: HOSPITAL | Age: 35
Discharge: HOME | End: 2025-01-16
Attending: STUDENT IN AN ORGANIZED HEALTH CARE EDUCATION/TRAINING PROGRAM | Admitting: STUDENT IN AN ORGANIZED HEALTH CARE EDUCATION/TRAINING PROGRAM
Payer: COMMERCIAL

## 2025-01-16 VITALS
SYSTOLIC BLOOD PRESSURE: 102 MMHG | BODY MASS INDEX: 33.49 KG/M2 | RESPIRATION RATE: 18 BRPM | HEART RATE: 98 BPM | DIASTOLIC BLOOD PRESSURE: 75 MMHG | TEMPERATURE: 98.2 F | OXYGEN SATURATION: 97 % | WEIGHT: 201 LBS | HEIGHT: 65 IN

## 2025-01-16 PROBLEM — Z3A.36 36 WEEKS GESTATION OF PREGNANCY: Status: ACTIVE | Noted: 2024-08-28

## 2025-01-16 PROCEDURE — 99213 OFFICE O/P EST LOW 20 MIN: CPT

## 2025-01-16 RX ORDER — ACETAMINOPHEN 325 MG/1
975 TABLET ORAL ONCE
Status: COMPLETED | OUTPATIENT
Start: 2025-01-16 | End: 2025-01-16

## 2025-01-16 RX ADMIN — ACETAMINOPHEN 975 MG: 325 TABLET, FILM COATED ORAL at 14:49

## 2025-01-16 NOTE — PROGRESS NOTES
L&D Triage Note - OB/GYN  Jessica Alejandro 35 y.o. female MRN: [id number]  Unit/Bed#: LD TRIAGE 1- Encounter: [id number]      ASSESSMENT/PLAN  Jessica Alejandro is a 35 y.o.  at 36w4d who presents complaining of a headache and leakage of fluid, no concern for ROM and headache improved with Tylenol      1) Leakage of fluid  - Reports noting leakage of fluid   - Speculum exam: Negative nitrazine, ferning, pooling.   - SVE: /-3  - Patient cleared for discharge to home, counseled on return precautions and signs of labor.     2) Headache  - Reports 3/10 headache at home,   - Administered Tylenol 975mg x1 while in triage with improvement of headache  - Patient cleared for discharge to home, counseled on return precautions and signs of labor    Discharge instructions  - Patient instructed to call if experiencing worsening contractions, vaginal bleeding, loss of fluid or decreased fetal movement  - Will follow up with OBGYN in office      She is a patient of [facility] .  Discussed with Dr. Davila.  ______________    SUBJECTIVE    KOFFI: Estimated Date of Delivery: 25    HPI:  35 y.o.  36w4d presents with complaint of fluid leakage from the vagina and mild headache. She took Tylenol 650mg at home with minimal improvement.      Contractions: none  Leakage of fluid: Reports odorless clear fluid leakage  Vaginal bleeding: Denies  Fetal movement: Present    Obstetrical history is significant for: SAB x2, GDM, advanced maternal age    ROS:  General: Denies: fever, chills, fatigue  Cardiovascular: negative for chest pain, palpitations  HEENT: Denies blurry vision, reports 3/10 headache  Pulmonary: Denies cough, shortness of breath or dyspnea on exertion  GI: Denies: abdominal pain, nausea, vomiting  : Denies hematuria and dysuria    Physical Exam:  General: in no apparent distress, alert, oriented times 3, and normal vitals  Cardiovascular: intact distals pulses  Lungs: non-labored breathing  Abdomen: Soft,  "Non-tender, Gravid  Lower extremities: nontender  Speculum Exam:     Membrane status   - No ferning   - No nitrazene   - No pooling   Fundal height: Appropriate for gestational age  Extremities: Warm and well perfused, non-tender      OBJECTIVE:  /75   Pulse 98   Temp 98.2 °F (36.8 °C) (Oral)   Resp 18   Ht 5' 5\" (1.651 m)   Wt 91.2 kg (201 lb)   LMP 05/05/2024 (Exact Date)   SpO2 97%   BMI 33.45 kg/m²   Body mass index is 33.45 kg/m².  Labs: No results found for this or any previous visit (from the past 24 hours).      SVE:  1/50/-3    FHT:  Baseline Rate (FHR): 130 bpm  Variability: Moderate  Accelerations: 15 x 15 or greater, At variable times  Decelerations: None    TOCO:   Contraction Frequency (minutes): 9-11  Contraction Duration (seconds):   Contraction Intensity: Mild/Moderate        Key Rowan MD  1/16/2025  4:36 PM      Portions of the record may have been created with voice recognition software.  Occasional wrong word or \"sound a like\" substitutions may have occurred due to the inherent limitations of voice recognition software.  Read the chart carefully and recognize, using context, where substitutions have occurred    "

## 2025-01-16 NOTE — TELEPHONE ENCOUNTER
"Pt is 36w4d, , calling with multiple concerns. States last night noted she lost mucous plug, and today continues with chunky discharge. States it is odorless, and has some pink/brown spotting mixed into it. Has been experiencing car mai contractions the last few days. Today noted baby is \"lower\" and movement is lower in abdomen. Also notes car mai feeling includes pressure and a mix of tightness and period-like cramping. Pt noted the last couple of days an increase in moisture in her underwear. States is odorless and clear fluid. Does not believe it is urine, as she had a few episodes of urine leakage in the past in this pregnancy and noted specific urine odor. Unsure if this is an increase in vaginal discharge, but fluid is clear and watery, not thicker. Denies vaginal bleeding. Endorses positive fetal movement otherwise. Pt has a hx of quick deliveries. RN advised should go to LD for further evaluation of possible rupture of membranes. Pt agreeable to plan. Will head over to [facility].     RN sent ESC to provider on call, Dr Davila, and LD Charge RN as CHANO.     Reason for Disposition  • Leakage of fluid from vagina  (Exception: Patient is uncertain, but thinks it might be urine incontinence.)    Additional Information  • Leakage of fluid (trickle, gush) from the vagina    Answer Assessment - Initial Assessment Questions  1. DISCHARGE: \"Describe the discharge.\" (e.g., white, yellow, green, gray, foamy, cottage cheese-like)      Mucous plug last night; this morning more chunky discharge/jelly-like discharge - pinkish/brownish.   2. ODOR: \"Is there a bad odor?\"      Denies  3. ONSET: \"When did the discharge begin?\"      Last night  4. RASH: \"Is there a rash in that area?\" If Yes, ask: \"Describe it.\" (e.g., redness, blisters, sores, bumps)      Denies  5. ABDOMEN PAIN: \"Are you having any abdomen pain?\" If Yes, ask: \"What does it feel like?\" (e.g., crampy, dull, intermittent, constant)       " "Intermittent ctx 5 mins apart; improved after rest  6. ABDOMEN PAIN SEVERITY: If present, ask: \"How bad is it?\"  (e.g., Scale 1-10; mild, moderate, or severe)      Pressure, between tightness and cramping.   7. CAUSE: \"What do you think is causing the discharge?\"      Unsure  8. OTHER SYMPTOMS: \"Do you have any other symptoms?\" (e.g., fever, itching, vaginal bleeding, pain with urination)      More moisture in underwear, possible trickling  9. KOFFI: \"What date are you expecting to deliver?\"       2/9/25  10. PREGNANCY: \"How many weeks pregnant are you?\"        36w4d    Protocols used: Pregnancy - Vaginal Discharge-Adult-[place], Pregnancy - Rupture of Membranes Suspected-Adult-[place]    "

## 2025-01-16 NOTE — DISCHARGE INSTRUCTIONS
Medications and Pregnancy  The following list of over-the-counter medications is usually considered safe to take during pregnancy. Take care to not double up on products containing acetaminophen (Tylenol).   Colds/Sore Throat  Robitussin DM - Plain (guaifenesin)  Saline nasal spray  Warm salt water gargle  Cepacol throat lozenges or mouthwash (cetylpyridinium)  Sucrets (hexylresoricinol)  Allergy  AVOID the “D” - or DECONGESTANT  Claritin (loratadine)  Zrytec (cetirizine)  Allerga (fexofenadine)  Headaches / Aches and Pains  Tylenol (acetaminophen)  Do NOT exceed more than 3000 mg of Tylenol in a 24-hour period.  Heartburn  Mylanta (aluminum hydroxide/simethicone, magnesium hydroxide)  Maalaox (aluminum magnesium hydroxide, magnesium hydroxide)  Tums (calcium carbonate)  Riopan (magaldrate)  Constipation  Colace (docusate sodium)  Surfak (docusate sodium)  MiraLAX  Glycerin suppositories  Fleets enema (sodium phosphate & sodium biphosphate)  Nausea/Vomiting  Vitamin B6 (pyridoxine) - May take 50 mg at bedtime, 25 mg in the morning, 25 mg in the afternoon  Unisom (doxylamine) - May use for nausea/vomiting - (cut a 25 mg tablet in half). May cause drowsiness.   Sleep  Benadryl (diphenhydramine) - Take 1-2 tablets as needed at bedtime  Unisom (doxylamine) 25 mg tablet - As needed at bedtime  Melatonin 5 mg tablet - As needed at bedtime    Generally the generic form of medicine is usually lower priced than the brand name form of the medicine.   Talk to your healthcare provider before you take any medicines.  Many medicines may harm your baby if you take them when you are pregnant. Do not take any medicines, vitamins, herbs, or supplements without first talking to your healthcare provider.     Warning signs during pregnancy  [phone] Answering service during non-business hours     1. Vaginal bleeding  2. Sharp abdominal pain that does not go away  3. Fever (more than 100.4 and is not relieved by Tylenol)  4.  Persistent vomiting lasting greater than 24 hours  5. Chest pain   6. Pain or burning when you urinate  7. Severe headache that doesn't resolve with Tylenol  8. Blurred vision or seeing spots in your vision  9. Sudden swelling of your face or hands  10. Redness, swelling or pain in a leg  11. A sudden weight gain in just a few days  12. Decrease in your baby's movement (after 28 weeks or the 6th month of pregnancy)for 24 hrs  13. A loss of watery fluid from your vagina - can be a gush, a trickle or continuous wetness  14. After 20 weeks of pregnancy, rhythmic cramping (greater than 4 per hour) or menstrual like low/pelvic pain  15. You have cravings for substances such as william, dirt, laundry starch, or ice.    Am I in labor?  As you enter the final stages of your pregnancy, your body will give signs that labor is approaching. The following information should help you to understand these signs and make it easier for you to determine whether you are in labour.  Some of the signs and symptoms of going into labor may include:  - Period-like cramps  - Backache  - Diarrhea  - Mucous discharge or ‘show’  - Gush or trickle of water as the membranes break  - Contractions    Engagement  As you move closer to delivery, your baby’s head may drop and become engaged in your pelvis in preparation for labour. If you are expecting your first baby, you may notice pressure in your groin and on your bladder beginning up to four weeks before the birth. You may also notice that you can breathe a little easier and have a little more appetite as your baby drops, and is not pushed up against your diaphragm and stomach quite so much. This is sometimes known as “lightening”, as women generally feel lighter.    Show  During your pregnancy a mucous plug fills your cervix. Towards the end of pregnancy, the cervix becomes softer and this mucous plug may become loosened and start to come away. The process of discharging this mucous is called a  "‘show’ and might often contain streaks of blood or may also be brownish in color. This is different from any flow of fresh blood which you would report immediately to your doctor or the hospital. The show may continue over a period of hours or even days. It is one of the signs that your body is preparing for birth. Labor may begin in the next few days, hours or weeks following a show. There is no need to phone the hospital if you have had a show.    Water breaking (rupture of membranes)  This may occur at any time prior to the start of labor, or at any time during labor. The break may be low, near the opening of the uterus, and will produce a gush of amniotic fluid. If this occurs, place a sanitary pad on and note the color of the fluid. Call the hospital. You will usually be asked to come in to the hospital.    Another type of amniotic fluid leak may occur higher up in the amniotic sack, or top of the uterus. This will be less obvious to you and you may only notice a trickle of fluid. Since many women have a heavy vaginal discharge or leak a small amount of urine towards the end of their pregnancy and it is often difficult to tell the difference between urine and amniotic fluid - urine is often yellow; where amniotic fluid is usually clear, or has a pink tinge, and has a \"sweet\" odor. If you are unsure, call the hospital.    If the color of the fluid is green or brownish, it indicates that your baby has passed a bowel movement (meconium) inside the uterus. It is very common to have meconium-stained amniotic fluid in a pregnancy over 41 weeks, but this may also be a sign that your baby is distressed. You will need to call the hospital immediately and then come into the hospital.    Contractions  Jeison Pryor contractions  Seneca Pryor contractions are sometimes mistaken for labor. These “practice” contractions usually start FPC through the pregnancy and continue right through to the end. These contractions are " often irregular and can be uncomfortable and tight. Garvin Pryor contractions usually increase in regularity and strength towards the end of your pregnancy, preparing your uterus for the birth. Sometimes it is difficult to distinguish between these Garvin Pryor contractions and labor contractions. Below are the common differences between the two.  Labor contractions  True labor contractions usually increase in strength and duration. In order to time your contractions, time the interval between the start of one contraction to the start of the next. Early labor contractions are often likened to period cramps with or without backache.    Garvin Pryor contractions    Labor contractions    usually irregular and short    become regular with time    do not get closer together    get closer together with time    do not get stronger     become stronger    walking does not make them stronger  walking can make them stronger    lying down may make them go away   lying down does not make them go away    uncomfortable and tight--not painful   Painful - can't walk, talk or breathe through them    How does labor start?  Labor can start in different ways. You may be start experiencing some period like pains or contractions. You might notice that these tightenings/contractions start to get stronger, closer and last longer than before. Or you might start with some back ache or a stomach upset that gets stronger and develops into regular contractions. In approximately 10-15% of women, labour will start when your membranes rupture. Contractions usually follow.    Should I call my doctor?  You should call your doctor when:  - your harris break  - you have bright blood loss  - your contractions are regular and five minutes apart  - if you have decreased fetal movement

## 2025-01-22 ENCOUNTER — ROUTINE PRENATAL (OUTPATIENT)
Dept: OBGYN CLINIC | Facility: CLINIC | Age: 35
End: 2025-01-22

## 2025-01-22 ENCOUNTER — TELEPHONE (OUTPATIENT)
Dept: OBGYN CLINIC | Facility: CLINIC | Age: 35
End: 2025-01-22

## 2025-01-22 VITALS — SYSTOLIC BLOOD PRESSURE: 110 MMHG | DIASTOLIC BLOOD PRESSURE: 70 MMHG | WEIGHT: 201 LBS | BODY MASS INDEX: 33.45 KG/M2

## 2025-01-22 DIAGNOSIS — O09.523 AMA (ADVANCED MATERNAL AGE) MULTIGRAVIDA 35+, THIRD TRIMESTER: ICD-10-CM

## 2025-01-22 DIAGNOSIS — Z30.09 FAMILY PLANNING: ICD-10-CM

## 2025-01-22 DIAGNOSIS — Z3A.36 36 WEEKS GESTATION OF PREGNANCY: ICD-10-CM

## 2025-01-22 DIAGNOSIS — O24.410 DIET CONTROLLED GESTATIONAL DIABETES MELLITUS (GDM) IN THIRD TRIMESTER: ICD-10-CM

## 2025-01-22 DIAGNOSIS — Z34.93 PRENATAL CARE IN THIRD TRIMESTER: Primary | ICD-10-CM

## 2025-01-22 PROCEDURE — PNV: Performed by: OBSTETRICS & GYNECOLOGY

## 2025-01-22 NOTE — TELEPHONE ENCOUNTER
----- Message from Radha Cruz MD sent at 1/22/2025  1:31 PM EST -----  Regarding: iol  Please schedule induction of labor at 39 weeks 1 day pit AROM for gestational diabetes thank you

## 2025-01-22 NOTE — TELEPHONE ENCOUNTER
Patient scheduled for IOL on 2/3/2025 @ 0700 with Sylwia AGUILAR via ESC.  Pink sticky note updated.  Patient informed with instructions given.  Staff message sent to Dr Jordan

## 2025-01-22 NOTE — TELEPHONE ENCOUNTER
Called patient to discuss IOL date or provider preference - ESC message sent to Sylwia AGUILAR (NEGR[facility]O L&D) to check availability for 1 day IOL (GDM)  on 2/3/2025 (will be 39 wk 1 days) - EDC 2/9/2025

## 2025-01-22 NOTE — ASSESSMENT & PLAN NOTE
- Continue PNV  - Labor precautions reviewed  - Fetal kick counts reviewed  - Labs: UTD; Failed 3 hr- GDM  - Ultrasounds:  US at 35w2d: EFW Hadlock 4 2610 grams - 5 lbs 12 oz (44%)   - Tdap: Done 24  - Flu Shot:   - RSV:   - COVID: Unvaccinated  - Rhogam: N/A  - Delivery:  without epidural  - Contraception: Tubal if C/S, otherwise, vasectomy  - Breastfeeding: Formula  - Pediatrician: Established   -Delivery consent signed.  -GBS: neg  -A1 GDM concern for LGA baby has this feels to her as biggest child and is male reviewed IOL at 39 weeks 1 day pit AROM message sent to staff to schedule, may want membrane sweep next visit discussed scheduling with staff experience with that  - RTO in 1 weeks

## 2025-01-27 PROBLEM — Z3A.38 38 WEEKS GESTATION OF PREGNANCY: Status: ACTIVE | Noted: 2024-08-28

## 2025-01-27 NOTE — ASSESSMENT & PLAN NOTE
- Continue PNV  - Labor precautions reviewed  - Fetal kick counts reviewed  - Labs: UTD; Failed 3 hr- GDM  - Ultrasounds: 25 normal fetal growth and anatomy, JADYN 24cm, 5lbs12 oz EFW   - Tdap: Done 24  - Flu Shot:   - RSV:   - COVID: Unvaccinated  - Rhogam: N/A  - Delivery:  without epidural  - Contraception: Tubal if C/S, otherwise, vasectomy  - Breastfeeding: Formula  - Pediatrician: Established   -Delivery consent signed.  -GBS: negative  - Membrane sweep completed today. Pt tolerated procedure.   -Plan IOL as scheduled if not delivered by Monday.

## 2025-01-27 NOTE — PROGRESS NOTES
OB/GYN  PN Visit  Jessica Alejandro  [id number]  2025  8:34 AM  Leah Lorenzana PA-C    S: 35 y.o.  38w1d here for PN visit. Pregnancy complicated by AMA GDM, uterine fibroids.     OB complaints:  Denies c/o n/v/ha, no edema, no smoking, no DV.   No vb/lof  No cramping/ctxns or signs of PTL.    She reports that she has a planned IOL early next week. She is ready for delivery and desires to avoid pitocin if possible. She had previous reviewed and planned for membrane sweep today.   We reviewed risks of bleeding, spontaneous ROM and labor. Pt agreeable.     O:    Pre-Maria R Vitals    Flowsheet Row Most Recent Value   Prenatal Assessment    Fetal Heart Rate 142   Fundal Height (cm) 38 cm   Movement Present   Prenatal Vitals    Blood Pressure 116/74   Weight - Scale 92.1 kg (203 lb)   Urine Albumin/Glucose    Dilation/Effacement/Station    Cervical Dilation 2.5  [membrane sweep completed]   Cervical Effacement 60   Fetal Station -2   Vaginal Drainage    Draining Fluid No   Edema    LLE Edema None   RLE Edema None   Facial Edema None            Gen: no acute distress, nonlabored breathing.  OB exam completed: fundal height, +FHT.  Urine: -/-    A/P:    1. 38 weeks gestation of pregnancy  Overview:  - s/p TDAP  Assessment & Plan:  - Continue PNV  - Labor precautions reviewed  - Fetal kick counts reviewed  - Labs: UTD; Failed 3 hr- GDM  - Ultrasounds: 25 normal fetal growth and anatomy, JADYN 24cm, 5lbs12 oz EFW   - Tdap: Done 24  - Flu Shot:   - RSV:   - COVID: Unvaccinated  - Rhogam: N/A  - Delivery:  without epidural  - Contraception: Tubal if C/S, otherwise, vasectomy  - Breastfeeding: Formula  - Pediatrician: Established   -Delivery consent signed.  -GBS: negative  - Membrane sweep completed today. Pt tolerated procedure.   -Plan IOL as scheduled if not delivered by Monday.       2. AMA (advanced maternal age) multigravida 35+, third trimester  3. Diet controlled gestational diabetes  mellitus (GDM) in third trimester  4. Family planning  Overview:  - considering tubal only if CD is indicated, MA 31 signed today. Otherwise  plans to get vasectomy           RTC in 1 weeks    Leah Lorenzana PA-C  1/28/2025  8:34 AM

## 2025-01-28 ENCOUNTER — ROUTINE PRENATAL (OUTPATIENT)
Dept: OBGYN CLINIC | Facility: CLINIC | Age: 35
End: 2025-01-28

## 2025-01-28 VITALS — WEIGHT: 203 LBS | SYSTOLIC BLOOD PRESSURE: 116 MMHG | DIASTOLIC BLOOD PRESSURE: 74 MMHG | BODY MASS INDEX: 33.78 KG/M2

## 2025-01-28 DIAGNOSIS — O24.410 DIET CONTROLLED GESTATIONAL DIABETES MELLITUS (GDM) IN THIRD TRIMESTER: ICD-10-CM

## 2025-01-28 DIAGNOSIS — Z3A.38 38 WEEKS GESTATION OF PREGNANCY: Primary | ICD-10-CM

## 2025-01-28 DIAGNOSIS — O09.523 AMA (ADVANCED MATERNAL AGE) MULTIGRAVIDA 35+, THIRD TRIMESTER: ICD-10-CM

## 2025-01-28 DIAGNOSIS — Z30.09 FAMILY PLANNING: ICD-10-CM

## 2025-01-28 PROCEDURE — PNV: Performed by: PHYSICIAN ASSISTANT

## 2025-01-29 ENCOUNTER — NURSE TRIAGE (OUTPATIENT)
Dept: OTHER | Facility: OTHER | Age: 35
End: 2025-01-29

## 2025-01-29 ENCOUNTER — HOSPITAL ENCOUNTER (INPATIENT)
Facility: HOSPITAL | Age: 35
LOS: 2 days | Discharge: HOME | End: 2025-01-31
Attending: OBSTETRICS & GYNECOLOGY | Admitting: OBSTETRICS & GYNECOLOGY
Payer: COMMERCIAL

## 2025-01-29 DIAGNOSIS — Z3A.38 38 WEEKS GESTATION OF PREGNANCY: Primary | ICD-10-CM

## 2025-01-29 PROBLEM — Z37.9 NORMAL LABOR: Status: ACTIVE | Noted: 2025-01-29

## 2025-01-29 PROBLEM — Z37.9 NORMAL LABOR: Chronic | Status: ACTIVE | Noted: 2025-01-29

## 2025-01-29 LAB
ABO GROUP BLD: NORMAL
BASE EXCESS BLDCOA CALC-SCNC: -3.1 MMOL/L (ref 3–11)
BASE EXCESS BLDCOV CALC-SCNC: 0.7 MMOL/L (ref 1–9)
BLD GP AB SCN SERPL QL: NEGATIVE
ERYTHROCYTE [DISTWIDTH] IN BLOOD BY AUTOMATED COUNT: 13 % (ref 11.6–15.1)
HCO3 BLDCOA-SCNC: 24.2 MMOL/L (ref 17.3–27.3)
HCO3 BLDCOV-SCNC: 25.5 MMOL/L (ref 12.2–28.6)
HCT VFR BLD AUTO: 33.8 % (ref 34.8–46.1)
HGB BLD-MCNC: 11.4 G/DL (ref 11.5–15.4)
HOLD SPECIMEN: NORMAL
MCH RBC QN AUTO: 29.3 PG (ref 26.8–34.3)
MCHC RBC AUTO-ENTMCNC: 33.7 G/DL (ref 31.4–37.4)
MCV RBC AUTO: 87 FL (ref 82–98)
O2 CT VFR BLDCOA CALC: 7.4 ML/DL
OXYHGB MFR BLDCOA: 38.9 %
OXYHGB MFR BLDCOV: 68.3 %
PCO2 BLDCOA: 52.9 MM[HG] (ref 30–60)
PCO2 BLDCOV: 41.2 MM HG (ref 27–43)
PH BLDCOA: 7.28 [PH] (ref 7.23–7.43)
PH BLDCOV: 7.41 [PH] (ref 7.19–7.49)
PLATELET # BLD AUTO: 331 THOUSANDS/UL (ref 149–390)
PMV BLD AUTO: 9.7 FL (ref 8.9–12.7)
PO2 BLDCOA: 20.3 MM HG (ref 5–25)
PO2 BLDCOV: 26.9 MM HG (ref 15–45)
RBC # BLD AUTO: 3.89 MILLION/UL (ref 3.81–5.12)
RH BLD: POSITIVE
SAO2 % BLDCOV: 12.5 ML/DL
SPECIMEN EXPIRATION DATE: NORMAL
TREPONEMA PALLIDUM IGG+IGM AB [PRESENCE] IN SERUM OR PLASMA BY IMMUNOASSAY: NORMAL
WBC # BLD AUTO: 10.85 THOUSAND/UL (ref 4.31–10.16)

## 2025-01-29 PROCEDURE — 86901 BLOOD TYPING SEROLOGIC RH(D): CPT

## 2025-01-29 PROCEDURE — 86850 RBC ANTIBODY SCREEN: CPT

## 2025-01-29 PROCEDURE — 86900 BLOOD TYPING SEROLOGIC ABO: CPT

## 2025-01-29 PROCEDURE — 99213 OFFICE O/P EST LOW 20 MIN: CPT

## 2025-01-29 PROCEDURE — NC001 PR NO CHARGE: Performed by: OBSTETRICS & GYNECOLOGY

## 2025-01-29 PROCEDURE — 82805 BLOOD GASES W/O2 SATURATION: CPT | Performed by: OBSTETRICS & GYNECOLOGY

## 2025-01-29 PROCEDURE — 4A1HXCZ MONITORING OF PRODUCTS OF CONCEPTION, CARDIAC RATE, EXTERNAL APPROACH: ICD-10-PCS | Performed by: OBSTETRICS & GYNECOLOGY

## 2025-01-29 PROCEDURE — 59400 OBSTETRICAL CARE: CPT | Performed by: OBSTETRICS & GYNECOLOGY

## 2025-01-29 PROCEDURE — 85027 COMPLETE CBC AUTOMATED: CPT

## 2025-01-29 PROCEDURE — 86780 TREPONEMA PALLIDUM: CPT

## 2025-01-29 RX ORDER — DOCUSATE SODIUM 100 MG/1
100 CAPSULE, LIQUID FILLED ORAL 2 TIMES DAILY
Status: DISCONTINUED | OUTPATIENT
Start: 2025-01-29 | End: 2025-01-31 | Stop reason: HOSPADM

## 2025-01-29 RX ORDER — ONDANSETRON 2 MG/ML
4 INJECTION INTRAMUSCULAR; INTRAVENOUS EVERY 6 HOURS PRN
Status: DISCONTINUED | OUTPATIENT
Start: 2025-01-29 | End: 2025-01-29

## 2025-01-29 RX ORDER — BENZOCAINE/MENTHOL 6 MG-10 MG
1 LOZENGE MUCOUS MEMBRANE DAILY PRN
Status: DISCONTINUED | OUTPATIENT
Start: 2025-01-29 | End: 2025-01-31 | Stop reason: HOSPADM

## 2025-01-29 RX ORDER — BUPIVACAINE HYDROCHLORIDE 2.5 MG/ML
30 INJECTION, SOLUTION EPIDURAL; INFILTRATION; INTRACAUDAL ONCE AS NEEDED
Status: DISCONTINUED | OUTPATIENT
Start: 2025-01-29 | End: 2025-01-29

## 2025-01-29 RX ORDER — IBUPROFEN 600 MG/1
600 TABLET, FILM COATED ORAL EVERY 6 HOURS
Status: DISCONTINUED | OUTPATIENT
Start: 2025-01-29 | End: 2025-01-31 | Stop reason: HOSPADM

## 2025-01-29 RX ORDER — CALCIUM CARBONATE 500 MG/1
1000 TABLET, CHEWABLE ORAL DAILY PRN
Status: DISCONTINUED | OUTPATIENT
Start: 2025-01-29 | End: 2025-01-31 | Stop reason: HOSPADM

## 2025-01-29 RX ORDER — SODIUM CHLORIDE, SODIUM LACTATE, POTASSIUM CHLORIDE, CALCIUM CHLORIDE 600; 310; 30; 20 MG/100ML; MG/100ML; MG/100ML; MG/100ML
125 INJECTION, SOLUTION INTRAVENOUS CONTINUOUS
Status: DISCONTINUED | OUTPATIENT
Start: 2025-01-29 | End: 2025-01-29

## 2025-01-29 RX ORDER — ECHINACEA PURPUREA EXTRACT 125 MG
1 TABLET ORAL
Status: DISCONTINUED | OUTPATIENT
Start: 2025-01-29 | End: 2025-01-31 | Stop reason: HOSPADM

## 2025-01-29 RX ORDER — DIPHENHYDRAMINE HYDROCHLORIDE 50 MG/ML
25 INJECTION INTRAMUSCULAR; INTRAVENOUS EVERY 6 HOURS PRN
Status: DISCONTINUED | OUTPATIENT
Start: 2025-01-29 | End: 2025-01-31 | Stop reason: HOSPADM

## 2025-01-29 RX ORDER — ONDANSETRON 2 MG/ML
4 INJECTION INTRAMUSCULAR; INTRAVENOUS EVERY 8 HOURS PRN
Status: DISCONTINUED | OUTPATIENT
Start: 2025-01-29 | End: 2025-01-31 | Stop reason: HOSPADM

## 2025-01-29 RX ORDER — ACETAMINOPHEN 325 MG/1
650 TABLET ORAL EVERY 6 HOURS PRN
Status: DISCONTINUED | OUTPATIENT
Start: 2025-01-29 | End: 2025-01-31 | Stop reason: HOSPADM

## 2025-01-29 RX ORDER — LIDOCAINE HYDROCHLORIDE 10 MG/ML
INJECTION, SOLUTION EPIDURAL; INFILTRATION; INTRACAUDAL; PERINEURAL
Status: DISCONTINUED
Start: 2025-01-29 | End: 2025-01-29 | Stop reason: WASHOUT

## 2025-01-29 RX ORDER — OXYTOCIN/RINGER'S LACTATE 30/500 ML
PLASTIC BAG, INJECTION (ML) INTRAVENOUS
Status: COMPLETED
Start: 2025-01-29 | End: 2025-01-29

## 2025-01-29 RX ORDER — OXYTOCIN/RINGER'S LACTATE 30/500 ML
250 PLASTIC BAG, INJECTION (ML) INTRAVENOUS ONCE
Status: COMPLETED | OUTPATIENT
Start: 2025-01-29 | End: 2025-01-29

## 2025-01-29 RX ADMIN — ACETAMINOPHEN 650 MG: 325 TABLET, FILM COATED ORAL at 22:32

## 2025-01-29 RX ADMIN — BENZOCAINE AND LEVOMENTHOL 1 APPLICATION: 200; 5 SPRAY TOPICAL at 07:31

## 2025-01-29 RX ADMIN — SALINE NASAL SPRAY 1 SPRAY: 1.5 SOLUTION NASAL at 18:04

## 2025-01-29 RX ADMIN — Medication 30 UNITS: at 04:07

## 2025-01-29 RX ADMIN — IBUPROFEN 600 MG: 600 TABLET, FILM COATED ORAL at 22:32

## 2025-01-29 RX ADMIN — SODIUM CHLORIDE, POTASSIUM CHLORIDE, SODIUM LACTATE AND CALCIUM CHLORIDE 125 ML/HR: 600; 310; 30; 20 INJECTION, SOLUTION INTRAVENOUS at 03:20

## 2025-01-29 RX ADMIN — DOCUSATE SODIUM 100 MG: 100 CAPSULE, LIQUID FILLED ORAL at 18:05

## 2025-01-29 RX ADMIN — WITCH HAZEL 1 PAD: 500 SOLUTION RECTAL; TOPICAL at 07:31

## 2025-01-29 RX ADMIN — SODIUM CHLORIDE, POTASSIUM CHLORIDE, SODIUM LACTATE AND CALCIUM CHLORIDE 125 ML/HR: 600; 310; 30; 20 INJECTION, SOLUTION INTRAVENOUS at 04:20

## 2025-01-29 NOTE — TELEPHONE ENCOUNTER
"Reason for Disposition   [1] History of prior delivery (multipara) AND [2] contractions < 10 minutes apart AND [3] present 1 hour    Answer Assessment - Initial Assessment Questions  1. ONSET: \"When did the symptoms begin?\"           Yesterday before bed    2. CONTRACTIONS: \"Describe the contractions that you are having.\" (e.g., duration, frequency, regularity, severity)    Every 2 minutes and really needing to focus and breath through contractions     3. PREGNANCY: \"How many weeks pregnant are you?\"        38w3d    4. KOFFI: \"What date are you expecting to deliver?\"        2025    5. PARITY: \"Have you had a baby before?\" If Yes, ask: \"How long did the labor last?\"        4th baby    6. FETAL MOVEMENT: \"Has the baby's movement decreased or changed significantly from normal?\"        Good FM    7. OTHER SYMPTOMS: \"Do you have any other symptoms?\" (e.g., leaking fluid from vagina, vaginal bleeding, fever, hand/facial swelling)    Membrane sweep, bloody show, denies leaking fluid    Patient is 38w3d  and calling in with contractions every every 7 minutes before bed and now 1.5-2 minutes apart. She had a membrane sweep this morning and having some bloody show now. Denies leaking fluid and other symptoms. Last felt baby move 30 minutes ago but has been really focuses on contractions. Advised patient to head to the hospital per protocol.  She will arrive to the hospital in 25-30 minutes. Notified on call provider and charge RN.    Protocols used: Pregnancy - Labor-Adult-    "

## 2025-01-29 NOTE — DISCHARGE INSTR - AVS FIRST PAGE
Vaginal Delivery   WHAT YOU SHOULD KNOW:   A vaginal delivery is the birth of your baby through your vagina (birth canal).        AFTER YOU LEAVE:   Medicines:  NSAIDs  help decrease swelling and pain or fever. This medicine is available with or without a doctor's order. NSAIDs can cause stomach bleeding or kidney problems in certain people. If you take blood thinner medicine, always ask your healthcare provider if NSAIDs are safe for you. Always read the medicine label and follow directions.    Take your medicine as directed.  Call your healthcare provider if you think your medicine is not helping or if you have side effects. Tell him if you are allergic to any medicine. Keep a list of the medicines, vitamins, and herbs you take. Include the amounts, and when and why you take them. Bring the list or the pill bottles to follow-up visits. Carry your medicine list with you in case of an emergency.  Follow up with your primary healthcare provider:  Most women need to return 6 weeks after a vaginal delivery. Ask about how to care for your wounds or stitches. Write down your questions so you remember to ask them during your visits.  Activity:  Rest as much as possible. Try to keep all activities short. You may be able to do some exercise soon after you have your baby. Talk with your primary healthcare provider before you start exercising. If you work outside the home, ask when you can return to your job.  Kegel exercises:  Kegel exercises may help your vaginal and rectal muscles heal faster. You can do Kegel exercises by tightening and relaxing the muscles around your vagina. Kegel exercises help make the muscles stronger.   Breast care:  When your milk comes in, your breasts may feel full and hard. Ask how to care for your breasts, even if you are not breastfeeding.   Constipation:  Do not try to push the bowel movement out if it is too hard. High-fiber foods, extra liquids, and regular exercise can help you prevent  constipation. Examples of high-fiber foods are fruit and bran. Prune juice and water are good liquids to drink. Regular exercise helps your digestive system work. You may also be told to take over-the-counter fiber and stool softener medicines. Take these items as directed.   Hemorrhoids:  Pregnancy can cause severe hemorrhoids. You may have rectal pain because of the hemorrhoids. Ask how to prevent or treat hemorrhoids.  Perineum care:  Your perineum is the area between your vagina and anus. Keep the area clean and dry to help it heal and to prevent infection. Wash the area gently with soap and water when you bathe or shower. Rinse your perineum with warm water when you use the toilet. Your primary healthcare provider may suggest you use a warm sitz bath to help decrease pain. A sitz bath is a bathtub or basin filled to hip level. Stay in the sitz bath for 20 to 30 minutes, or as directed.   Vaginal discharge:  You will have vaginal discharge, called lochia, after your delivery. The lochia is bright red the first day or two after the birth. By the fourth day, the amount decreases, and it turns red-brown. Use a sanitary pad rather than a tampon to prevent a vaginal infection. It is normal to have lochia up to 8 weeks after your baby is born.   Monthly periods:  Your period may start again within 7 to 12 weeks after your baby is born. If you are breastfeeding, it may take longer for your period to start again. You can still get pregnant again even though you do not have your monthly period. Talk with your primary healthcare provider about a birth control method that will be good for you if you do not want to get pregnant.  Mood changes:  Many new mothers have some kind of mood changes after delivery. Some of these changes occur because of lack of sleep, hormone changes, and caring for a new baby. Some mood changes can be more serious, such as postpartum depression. Talk with your primary healthcare provider if you  feel unable to care for yourself or your baby.  Sexual activity:  You may need to avoid sex for 6 to 7 weeks after you have your baby. You may notice you have a decreased desire for sex, or sex may be painful. You may need to use a vaginal lubricant (gel) to help make sex more comfortable.  Contact your primary healthcare provider if:   You have heavy vaginal bleeding that fills 1 or more sanitary pads in 1 hour.    You have a fever.    Your pain does not go away, or gets worse.    The skin between your vagina and rectum is swollen, warm, or red.    You have swollen, hard, or painful breasts.    You feel very sad or depressed.    You feel more tired than usual.     You have questions or concerns about your condition or care.  Seek care immediately or call 911 if:   You have pus or yellow drainage coming from your vagina or wound.    You are urinating very little, or not at all.    Your arm or leg feels warm, tender, and painful. It may look swollen and red.    You feel lightheaded, have sudden and worsening chest pain, or trouble breathing. You may have more pain when you take deep breaths or cough, or you may cough up blood.  © 2014 [employer]. Information is for End User's use only and may not be sold, redistributed or otherwise used for commercial purposes. All illustrations and images included in CareNotes® are the copyrighted property of [employer]. or [employer].  The above information is an  only. It is not intended as medical advice for individual conditions or treatments. Talk to your doctor, nurse or pharmacist before following any medical regimen to see if it is safe and effective for you.    Postpartum Perineal Care   WHAT YOU NEED TO KNOW:   Postpartum perineal care is care for your perineum after you have a baby. The perineum is your vagina and anus.   DISCHARGE INSTRUCTIONS:   Care for your perineum:  Healthcare providers will give you a small squirt  bottle and show you how to use it. Do the following after you use the toilet and before you put on a new pad:  Remove the soiled pad    Use the squirt bottle to rinse your perineum from front to back while you sit on the toilet     Pat the area dry from front to back with toilet paper or a cotton cloth     Put on a fresh pad    Wash your hands  Decrease pain:  Ask your healthcare provider about these and other ways to decrease perineal pain:  Sitz baths:  Healthcare providers may give you a portable sitz bath. This is a small tub that fits in the toilet. Fill the sitz bath or bathtub with 4 to 6 inches of warm water. Sit in the warm water for 20 minutes 2 to 3 times a day.    Ice:  Ice helps decrease swelling and pain. Ice may also help prevent tissue damage. Use an ice pack, or put crushed ice in a plastic bag. Cover it with a towel and place it on your perineum for 15 to 20 minutes every hour, or as directed.    Medicine spray, wipes, or pads:  Healthcare providers may give you a medicine spray or wipes soaked with numbing medicine to decrease the pain. Pads that contain an herb called witch hazel may also help reduce pain. Use these after perineal care or a sitz bath.  Follow up with your healthcare provider as directed:  Write down your questions so you remember to ask them during your visits.   Contact your healthcare provider if:   You have heavy vaginal bleeding that fills 1 or more sanitary pads in 1 hour.    You have foul-smelling vaginal discharge.    You feel weak or lightheaded.    You have questions or concerns about your condition or care.  Seek care immediately or call 911 if:   You have large blood clots or bright red blood coming from your vagina.    You have abdominal pain, vomiting, and a fever.  © 2017 [employer] Information is for End User's use only and may not be sold, redistributed or otherwise used for commercial purposes. All illustrations and images included in CareNotes®  are the copyrighted property of [employer] or [employer].  The above information is an  only. It is not intended as medical advice for individual conditions or treatments. Talk to your doctor, nurse or pharmacist before following any medical regimen to see if it is safe and effective for you.      Postpartum Depression   WHAT YOU NEED TO KNOW:   What is postpartum depression?  Postpartum depression is a mood disorder that occurs after giving birth. A mood is an emotion or a feeling. Moods affect your behavior and how you feel about yourself and life in general. Depression is a sad mood that you cannot control. Women often feel sad, afraid, or nervous after their baby is born. These feelings are called postpartum blues or baby blues, and they usually go away in 1 to 2 weeks. With postpartum depression, these symptoms get worse and continue for more than 2 weeks. Postpartum depression is a serious condition that affects your daily activities and relationships.   What causes postpartum depression?  Healthcare providers do not know exactly what causes postpartum depression. It may be caused by a sudden drop in hormone levels after childbirth. A previous episode of postpartum depression or a family history of depression may increase your risk. Several things may trigger postpartum depression:  Lack of support from the baby's father or other family members    Feeling more tired than usual    Stress, a poor diet, or lack of sleep    Pain after childbirth or pain during breastfeeding    Sudden change in lifestyle  How is postpartum depression diagnosed?  Postpartum depression affects your daily activities and your relationships with other people. Healthcare providers will ask you questions about your signs and symptoms and how they are affecting your life. The symptoms of postpartum depression usually begin within 1 month after childbirth. You feel depressed or lose interest in activities you  enjoy nearly every day for at least 2 weeks. You also have 4 or more of the following symptoms:  You feel tired or have less energy than usual.     You feel unimportant or guilty most of the time.    You think about hurting or killing yourself.    Your appetite changes. You may lose your appetite and lose weight without trying. Your appetite may also increase and you may gain weight.    You are restless, irritable, or withdrawn.    You have trouble concentrating and remembering things. You have trouble doing daily tasks or making decisions.    You have trouble sleeping, even after the baby is asleep.  How is postpartum depression treated?   Psychotherapy:  During therapy, you will talk with healthcare providers about how to cope with your feelings and moods. This can be done alone or in a group. It may also be done with family members or your partner.     Antidepressants:  This medicine is given to decrease or stop the symptoms of depression. You usually need to take antidepressants for several weeks before you begin to feel better. Do not stop taking antidepressants unless your healthcare provider tells you to. Healthcare providers may try a different antidepressant if one type does not work.  What can I do to feel better?   Rest:  Do not try to do everything all at the same time. Do only what is needed and let other things wait until later. Ask your family or friends for help, especially if you have other children. Ask your partner to help with night feedings or other baby care. Try to sleep when the baby naps.     Get emotional support:  Share your feelings with your partner, a friend, or another mother.     Take care of yourself:  Shower and dress each day. Do not skip meals. Try to get out of the house a little each day. Get regular exercise. Eat a healthy diet. Avoid alcohol because it can make your depression worse. Do not isolate yourself. Go for a walk or meet with a friend. It is also important that you  have some time by yourself each day.  How do I find support and more information?   [employer] [place] [facility] ([facility]), Public Information & Communication Branch  [address], [address], [address]  [place] [address]   Phone: [phone] [phone]  Phone: 7- 011 - [phone]  Web Address: http://www.[email or web address].gov/  When should I contact my healthcare provider?   You cannot make it to your next visit.    Your depression does not get better with treatment or it gets worse.     You have questions or concerns about your condition or care.  When should I seek immediate care or call 911?   You think about hurting or killing yourself, your baby, or someone else.    You feel like other people want to hurt you.     You hear voices telling you to hurt yourself or your baby.  CARE AGREEMENT:   You have the right to help plan your care. Learn about your health condition and how it may be treated. Discuss treatment options with your caregivers to decide what care you want to receive. You always have the right to refuse treatment. The above information is an  only. It is not intended as medical advice for individual conditions or treatments. Talk to your doctor, nurse or pharmacist before following any medical regimen to see if it is safe and effective for you.  © 2017 [employer] Information is for End User's use only and may not be sold, redistributed or otherwise used for commercial purposes. All illustrations and images included in CareNotes® are the copyrighted property of [employer]M., Inc. or [employer].      Postpartum Bleeding   WHAT YOU NEED TO KNOW:   Postpartum bleeding is vaginal bleeding after childbirth. This bleeding is normal, whether your baby was born vaginally or by . It contains blood and the tissue that lined the inside of your uterus when you were pregnant.   DISCHARGE INSTRUCTIONS:   What to expect with postpartum bleeding:  Postpartum  bleeding usually lasts at least 10 days, and may last longer than 6 weeks. Your bleeding may range from light (barely staining a pad) to heavy (soaking a pad in 1 hour). Usually, you have heavier bleeding right after childbirth, which slows over the next few weeks until it stops. The bleeding is red or dark brown with clots for the first 1 to 3 days. It then turns pink for several days, and then becomes a white or yellow discharge until it ends.  Follow up with your obstetrician as directed:  Do not have sex until your obstetrician says it is okay. Write down your questions so you remember to ask them during your visits.  Contact your healthcare provider or obstetrician if:   Your bleeding increases, or you have heavy bleeding that soaks a pad in 1 hour for 2 hours in a row.    You pass large blood clots.    You are breathing faster than normal, or your heart is beating faster than normal.    You are urinating less than usual, or not at all.    You feel dizzy.    You have questions or concerns about your condition or care.  Seek immediate care or call 911 if:   You are suddenly short of breath and feel lightheaded.    You have sudden chest pain.  © 2017 [employer] Information is for End User's use only and may not be sold, redistributed or otherwise used for commercial purposes. All illustrations and images included in CareNotes® are the copyrighted property of [employer] or [employer].  The above information is an  only. It is not intended as medical advice for individual conditions or treatments. Talk to your doctor, nurse or pharmacist before following any medical regimen to see if it is safe and effective for you.      Breast Care for the Breast Feeding Mother   WHAT YOU SHOULD KNOW:   Your breasts will go through normal changes while you are breastfeeding. Sometimes breast and nipple problems can develop while you are breastfeeding. Learn about changes that are  normal and those that may be a problem. Breast care can help you prevent and manage problems so you and your baby can enjoy the benefits of breastfeeding.  AFTER YOU LEAVE:   Breast changes while you are breastfeeding:   For the first few days after your baby is born, your body makes a small amount of breast milk (colostrum). Within about 2 to 5 days, your body will begin making mature milk. It may take up to 10 days or longer for mature milk to come in. When your mature milk comes in, your breasts will become full and firm. They may feel tender.     Breastfeeding your baby will decrease the full feeling in your breasts. You may feel a tingly sensation during feedings as milk is released from your breasts. This is called the milk let-down reflex. After 7 or more days, the fullness may feel like it has decreased. Your nipples should look the same as they did before you started breastfeeding. Breasts that feel full before and empty after breastfeeding are signs that breastfeeding is going well.  Breast problems that can occur while you are breastfeeding:   Nipple soreness  may occur when you begin to breastfeed your baby. You may also have nipple soreness if your baby is not latched on to your breast correctly. Correct positioning and latch-on may decrease or stop the pain in your nipples. Work with your caregivers to help your baby latch on correctly. It may also be helpful to place warm, wet compresses on your nipples to help decrease pain.     Plugged milk ducts  may cause painful breast lumps. Plugged ducts may be caused by not emptying your breasts completely during feedings. When your baby pauses during breastfeeding, massage and gently squeeze your breast. Gentle massage may unplug a blocked milk duct. Pump out any milk left in your breasts after your baby is done breastfeeding. Avoid wearing tight tops, tight bras, or under-wire bras, because they may put pressure on your breasts.    Engorgement  may occur as  your milk comes in soon after you begin breastfeeding. Engorgement may cause your breasts to become swollen and painful. Your breasts may also become engorged if you miss a feeding or you do not breastfeed on demand. The best way to decrease engorgement symptoms is to empty your breasts by feeding your baby often. Engorgement can make it hard for your baby to latch on to your breast. If this happens, express a small amount of milk and then have your baby latch on. Cold compresses, gel packs, or ice packs on your breasts can help decrease pain and swelling. Ask your caregiver how often and how long you should use cold, or ice packs.     A breast infection called mastitis  can develop if you have plugged milk ducts or engorgement. Mastitis causes your breasts to become red, swollen, and painful. You may also have flu-like symptoms, such as chills and a fever. Place heat on your breasts to help decrease the pain. You may want to place a moist, warm cloth on the painful breast or both of your breasts. Ask how often to do this. Your primary healthcare provider (PHP) may suggest that you take an NSAID, such as ibuprofen, to decrease pain and swelling. He may also order antibiotics to treat mastitis. Ask about feeding your baby when you have a breast infection.  How to help prevent or manage breast problems while you are breastfeeding:   Learn how to position your baby and latch him on correctly.  To latch your baby correctly to your breast, make sure that his mouth covers most of your areola (dark area around your nipple). He should not be attached only to the nipple. Your baby is latched on well if you feel comfortable and do not feel pain. A correct latch helps him get enough milk and can help to prevent sore nipples and other breast problems. There are several breastfeeding positions that you can try. Find the position that works best for you and your baby. Ask your caregiver for more information about how to hold and  breastfeed your baby.     Prevent biting.  Your baby may get teeth at about 3 to 4 months of age. To help prevent biting, break his suction once he is finished or if he has fallen asleep. To break his suction, slip a finger into the side of his mouth. If your baby bites you, respond with surprise or unhappiness. Offer praise when he does not bite you.     Breastfeed your baby regularly.  Feed your baby 8 to 12 times a day. You may need to wake up your baby at night to feed him. It is okay to feed from 1 or both breasts at each feeding. Your baby should breastfeed from both breasts equally over the course of a day. If your baby only feeds from 1 side during a feeding, offer your other breast to him first for the next feeding.     Schedule and keep follow-up visits.  Talk to your baby's pediatrician or your PHP during follow-up visits if you have breast problems. Caregivers may suggest that you, or you and your partner, attend classes on breastfeeding. You also may want to join a breastfeeding support group. Caregivers may suggest that you see a lactation consultant. This is a caregiver who can help you with breastfeeding.  Contact your PHP if:   You have a fever and chills.    You have body aches and you feel like you do not have any energy.    One or both of your breasts is red, swollen or hard, painful, and feels warm or hot.    You have breast engorgement that does not get better within 24 hours.     You see or feel a lump in your breast that hurts when you touch it.    You have nipple pain during breastfeeding or between feedings.     Your nipples are red, dry, cracked, or bleeding, or they have scabs on them.     You have questions or concerns about your condition or care.  © 2014 [employer] Information is for End User's use only and may not be sold, redistributed or otherwise used for commercial purposes. All illustrations and images included in CareNotes® are the copyrighted property of  [employer]. or [employer].  The above information is an  only. It is not intended as medical advice for individual conditions or treatments. Talk to your doctor, nurse or pharmacist before following any medical regimen to see if it is safe and effective for you.

## 2025-01-29 NOTE — H&P
H & P- Obstetrics   Jessica Alejandro 35 y.o. female MRN: [id number]  Unit/Bed#: -01 Encounter: [id number]    Assessment: 35 y.o.  at 38w3d admitted in active labor    Plan:   Diet controlled gestational diabetes mellitus (GDM) in third trimester  Assessment & Plan  Lab Results   Component Value Date    HGBA1C 4.7 2023     Follow up POCT glucose    38 weeks gestation of pregnancy  Assessment & Plan  Up to date on prenatal care    * Normal labor  Assessment & Plan    Admit to OBGYN   Clear liquid diet, IVF LR 125cc/hr   F/u T&S, CBC, RPR   GBS negative; EFW: 8lb  SVE:   Continuous fetal monitoring and tocometry   Analgesia at maternal request   Vertex by TAUS  Plan: expectant management           Discussed case and plan w/ Dr. Cruz      Chief Complaint: contractions    HPI: Jessica Alejandro is a 35 y.o.  with an KOFFI of 2025, by Last Menstrual Period at 38w3d who is being admitted in active labor. She complains of uterine contractions, occurring every 2 minutes, has no LOF, and reports spotting. She states she has felt good FM.    Patient Active Problem List   Diagnosis    H/O gestational diabetes in prior pregnancy, currently pregnant    Intractable migraine with status migrainosus    Thrombocytosis    Easy bruisability    Fatigue    Vitamin E deficiency    Vitamin K deficiency    Celiac disease    Vitamin deficiency    AMA (advanced maternal age) multigravida 35+, third trimester    38 weeks gestation of pregnancy    Diet controlled gestational diabetes mellitus (GDM) in third trimester    Family planning    Prenatal care in third trimester    Uterine contractions    Normal labor       Baby complications/comments: none    Review of Systems   Constitutional:  Negative for chills and fever.   HENT: Negative.     Respiratory:  Negative for cough and shortness of breath.    Cardiovascular:  Negative for chest pain.   Gastrointestinal:  Negative for abdominal pain, nausea and vomiting.    Genitourinary:  Positive for vaginal bleeding.   Musculoskeletal: Negative.    Neurological:  Negative for headaches.   Psychiatric/Behavioral: Negative.         OB Hx:  OB History    Para Term  AB Living   6 3 3  2 3   SAB IAB Ectopic Multiple Live Births   2 0 0  3      # Outcome Date GA Lbr Reji/2nd Weight Sex Type Anes PTL Lv   6 Current            5 Term 21 38w2d / 00:08 3595 g (7 lb 14.8 oz) F Vag-Spont Local N TREVOR      Birth Comments: GDM   4 Term 05/16/15 38w0d  3714 g (8 lb 3 oz) F Vag-Spont None N TREVOR      Birth Comments: pre gestational    3 SAB  6w0d    SAB      2 SAB  6w0d          1 Term 11 39w0d  3487 g (7 lb 11 oz) F Vag-Spont EPI N TREVOR      Complications: History of episiotomy      Obstetric Comments   H/o GDM   CF and SMA (neg) 20       Past Medical Hx:  Past Medical History:   Diagnosis Date    Anemia     no meds     Celiac disease     COVID-19 affecting pregnancy in second trimester 21    Gestational diabetes     gestational last pregnancy and prediabetes - Resolved with 40 lb weight loss    Gestational diabetes mellitus (GDM) in third trimester 2021    IBS (irritable bowel syndrome)     Migraine     occular migranes no meds     Numerous skin moles     pre cancerous moles removed     Urinary tract infection     Varicella     had a child        Past Surgical hx:  Past Surgical History:   Procedure Laterality Date    COLONOSCOPY  2012    negative    MOLE REMOVAL      SKIN TAG REMOVAL      removal of pre-cancerous moles x 2       Social Hx:  Alcohol use: denies  Tobacco use: denies  Other substance use: denies    Allergies   Allergen Reactions    Dye [Iodinated Contrast Media] Hives      Iv dye     Gluten Meal - Food Allergy Other (See Comments)     PMHX celiac         Medications Prior to Admission:     Blood Glucose Monitoring Suppl (OneTouch Verio Flex System) w/Device KIT    famotidine (PEPCID) 20 mg tablet    Lancets (OneTouch  Delica Plus [id number]) MISC    OneTouch Verio test strip    Prenatal Multivit-Min-Fe-FA (PRE- PO)    metoclopramide (REGLAN) 10 mg tablet    ondansetron (Zofran) 4 mg tablet    ondansetron (Zofran) 4 mg tablet    Objective:  Temp:  [98.4 °F (36.9 °C)] 98.4 °F (36.9 °C)  HR:  [88] 88  BP: (116-118)/(67-74) 118/67  Resp:  [18] 18  Body mass index is 33.78 kg/m².     Physical Exam:  Physical Exam  HENT:      Head: Normocephalic and atraumatic.      Mouth/Throat:      Mouth: Mucous membranes are moist.   Cardiovascular:      Rate and Rhythm: Normal rate.      Pulses: Normal pulses.   Pulmonary:      Effort: Pulmonary effort is normal. No respiratory distress.      Breath sounds: Normal breath sounds.   Abdominal:      General: There is no distension.   Neurological:      General: No focal deficit present.      Mental Status: She is alert.   Skin:     General: Skin is warm and dry.   Psychiatric:         Mood and Affect: Mood normal.         Behavior: Behavior normal.   Vitals reviewed.      FHT:  Baseline 145bpm / moderate variability / 15 x 15 accelerations present / intermittent variable decelerations    TOCO:   Contraction Intensity: Strong  Keysha q2 minutes    Lab Results   Component Value Date    WBC 10.85 (H) 2025    HGB 11.4 (L) 2025    HCT 33.8 (L) 2025     2025     Lab Results   Component Value Date    K 3.8 2025     2025    CO2 24 2025    BUN 9 2025    CREATININE 0.54 (L) 2025    AST 15 2025    ALT 17 2025       Prenatal Labs: Reviewed      Blood type: A pos  Antibody: negative  GBS: negative  HIV: Non-reactive  Rubella: Immune  Syphilis IgM/IgG: Non-reactive  HBsAg: Non-reactive  HCAb: Non-reactive  Chlamydia: Negative  Gonorrhea: Negative  Diabetes 1 hour screen: 141  3 hour glucose: 96, 185/151/82  Platelets: 331k  Hgb: 11.4    >2 Midnights  INPATIENT     Signature/Title: Jenn Bahena MD  Date: 2025  Time: 3:42  AM

## 2025-01-29 NOTE — PLAN OF CARE
Problem: Knowledge Deficit  Goal: Patient/family/caregiver demonstrates understanding of disease process, treatment plan, medications, and discharge instructions  Description: Complete learning assessment and assess knowledge base.  Interventions:  - Provide teaching at level of understanding  - Provide teaching via preferred learning methods  Outcome: Progressing     Problem: PAIN - ADULT  Goal: Verbalizes/displays adequate comfort level or baseline comfort level  Description: Interventions:  - Encourage patient to monitor pain and request assistance  - Assess pain using appropriate pain scale  - Administer analgesics based on type and severity of pain and evaluate response  - Implement non-pharmacological measures as appropriate and evaluate response  - Consider cultural and social influences on pain and pain management  - Notify physician/advanced practitioner if interventions unsuccessful or patient reports new pain  1/29/2025 0926 by Ivonne Hickey RN  Outcome: Progressing  1/29/2025 0923 by Ivonne Hickey RN  Outcome: Progressing     Problem: INFECTION - ADULT  Goal: Absence or prevention of progression during hospitalization  Description: INTERVENTIONS:  - Assess and monitor for signs and symptoms of infection  - Monitor lab/diagnostic results  - Monitor all insertion sites, i.e. indwelling lines, tubes, and drains  - Monitor endotracheal if appropriate and nasal secretions for changes in amount and color  - [place] appropriate cooling/warming therapies per order  - Administer medications as ordered  - Instruct and encourage patient and family to use good hand hygiene technique  - Identify and instruct in appropriate isolation precautions for identified infection/condition  1/29/2025 0926 by Ivonne Hickey RN  Outcome: Progressing  1/29/2025 0923 by Ivonne Hickey RN  Outcome: Progressing  Goal: Absence of fever/infection during neutropenic period  Description: INTERVENTIONS:  - Monitor  WBC    1/29/2025 0926 by Ivonne Hickey RN  Outcome: Progressing  1/29/2025 0923 by Ivonne Hickey RN  Outcome: Progressing     Problem: SAFETY ADULT  Goal: Patient will remain free of falls  Description: INTERVENTIONS:  - Educate patient/family on patient safety including physical limitations  - Instruct patient to call for assistance with activity   - Consult OT/PT to assist with strengthening/mobility   - Keep Call bell within reach  - Keep bed low and locked with side rails adjusted as appropriate  - Keep care items and personal belongings within reach  - Initiate and maintain comfort rounds  - Make Fall Risk Sign visible to staff  - Offer Toileting every PRN Hours, in advance of need  1/29/2025 0926 by Ivonne Hickey RN  Outcome: Progressing  1/29/2025 0923 by Ivonne Hickey RN  Outcome: Progressing  Goal: Maintain or return to baseline ADL function  Description: INTERVENTIONS:  -  Assess patient's ability to carry out ADLs; assess patient's baseline for ADL function and identify physical deficits which impact ability to perform ADLs (bathing, care of mouth/teeth, toileting, grooming, dressing, etc.)  - Assess/evaluate cause of self-care deficits   - Assess range of motion  - Assess patient's mobility; develop plan if impaired  - Assess patient's need for assistive devices and provide as appropriate  - Encourage maximum independence but intervene and supervise when necessary  - Involve family in performance of ADLs  - Assess for home care needs following discharge   - Consider OT consult to assist with ADL evaluation and planning for discharge  - Provide patient education as appropriate  1/29/2025 0926 by Ivonne Hickey RN  Outcome: Progressing  1/29/2025 0923 by Ivonne Hickey RN  Outcome: Progressing  Goal: Maintains/Returns to pre admission functional level  Description: INTERVENTIONS:  - Perform AM-PAC 6 Click Basic Mobility/ Daily Activity assessment daily.  - Set and communicate daily  mobility goal to care team and patient/family/caregiver.   - Collaborate with rehabilitation services on mobility goals if consulted  - Perform Range of Motion PRN times a day.  - Reposition patient every PRN hours.  - Dangle patient PRN times a day  - Stand patient PRN times a day  - Ambulate patient PRN times a day  - Out of bed to chair PRN times a day   - Out of bed for meals PRN times a day  - Out of bed for toileting  - Record patient progress and toleration of activity level   2025 by Ivonne Hickey RN  Outcome: Progressing  2025 by Ivonne Hickey RN  Outcome: Progressing     Problem: DISCHARGE PLANNING  Goal: Discharge to home or other facility with appropriate resources  Description: INTERVENTIONS:  - Identify barriers to discharge w/patient and caregiver  - Arrange for needed discharge resources and transportation as appropriate  - Identify discharge learning needs (meds, wound care, etc.)  - Arrange for interpretive services to assist at discharge as needed  - Refer to Case Management Department for coordinating discharge planning if the patient needs post-hospital services based on physician/advanced practitioner order or complex needs related to functional status, cognitive ability, or social support system  2025 by Ivonne Hickey RN  Outcome: Progressing  2025 by Ivonne Hickey RN  Outcome: Progressing     Problem: POSTPARTUM  Goal: Experiences normal postpartum course  Description: INTERVENTIONS:  - Monitor maternal vital signs  - Assess uterine involution and lochia  2025 by Ivonne Hickey RN  Outcome: Progressing  2025 by Ivonne Hickey RN  Outcome: Progressing  Goal: Appropriate maternal -  bonding  Description: INTERVENTIONS:  - Identify family support  - Assess for appropriate maternal/infant bonding   -Encourage maternal/infant bonding opportunities  - Referral to  or  as  needed  1/29/2025 0926 by Ivonne Hickey RN  Outcome: Progressing  1/29/2025 0923 by Ivonne Hickey RN  Outcome: Progressing  Goal: Establishment of infant feeding pattern  Description: INTERVENTIONS:  - Assess breast/bottle feeding  - Refer to lactation as needed  1/29/2025 0926 by Ivonne Hickey RN  Outcome: Progressing  1/29/2025 0923 by Ivonne Hickey RN  Outcome: Progressing  Goal: Incision(s), wounds(s) or drain site(s) healing without S/S of infection  Description: INTERVENTIONS  - Assess and document dressing, incision, wound bed, drain sites and surrounding tissue  - Provide patient and family education  - Perform skin care/dressing changes every PRN  1/29/2025 0926 by Ivonne Hickey RN  Outcome: Progressing  1/29/2025 0923 by Ivonne Hickey RN  Outcome: Progressing

## 2025-01-29 NOTE — L&D DELIVERY NOTE
Vaginal Delivery Summary - OB/GYN   Jessica Alejandro 35 y.o. female MRN: [id number]  Unit/Bed#: -01 Encounter: [id number]    Pre-delivery Diagnosis:   Pregnancy at 38 weeks gestation   Active labor  A1GDM    Post-delivery Diagnosis:   Same, delivered    Procedure: Spontaneous Vaginal Delivery     Attending Physician: Dr. Cruz  Resident Physician: Dr. Jenn Bahena    Anesthesia: None    QBL: 162 cc  Admission H.4 g/dL  Admission platelets: 331 thousands/uL    Complications: none apparent    Specimens:   1. Arterial and venous cord gases  2. Cord blood  3. Segment of umbilical cord  4. Placenta to storage     Findings:  1. Viable male on 2025 at 4:06 AM, with APGARS of 8  and 9  at 1 and 5 minutes respectively. Weight pending at time of dictation for skin to skin bonding.  2. Spontaneous delivery of intact placenta at 0409  3. 1 degree laceration, hemostatic and not requiring repair    Gases:  Umbilical Artery  Recent Labs     25  0410   PHCART 7.279   BECART -3.1*       Umbilical Vein  Recent Labs     25  0410   PHCVEN 7.409   BECVEN 0.7*         Brief history and labor course:  Jessica Alejandro is a 35 y.o.  who was admitted at 38w3d for labor.  She was 8/90/-1 on initial SVE. She had artifical rupture of membranes with clear fluid and quickly progressed to complete and began pushing     Description of delivery:    After pushing for 2 minutes, Jessica delivered a viable male , wt pending as mother is doing skin to skin bonding. The fetal vertex delivered SRINIVASA position spontaneously. There was no nuchal cord. The anterior shoulder delivered atraumatically with maternal expulsive forces and the assistance of gentle downward traction. The posterior shoulder delivered with maternal expulsive forces and the assistance of gentle upward traction. The remainder of the fetus delivered spontaneously.     Upon delivery, the infant was placed on the mothers abdomen and the cord was doubly clamped  and cut. Delayed cord clamping was achieved.The infant was noted to cry spontaneously and was moving all extremities appropriately. There was no evidence for injury. Nurse resuscitators evaluating the . Arterial and venous cord blood gases and cord blood was collected for analysis. These were promptly sent to the lab. In the immediate post-partum, active management of the 3rd stage of labor was performed with massage, the administration of 30 units of IV pitocin, and gentle traction on the umbilical cord. The placenta delivered spontaneously and was noted to have a centrally inserted 3 vessel cord.  The placenta was sent to storage.    The vagina, cervix, perineum, and rectum were inspected and there was noted to be a 1st degree laceration which was hemostatic and did not require repair.    Bimanual exam revealed minimal clots and good uterine tone.  The fundus was firm and at the level of the umbilicus.  At the conclusion of the procedure, all needle, sponge, and instrument counts were noted to be correct. Patient tolerated the procedure well and was allowed to recover in labor and delivery room with family and  before being transferred to the post-partum floor. Dr. Cruz was present and participated in all key portions of the case.    Disposition:  The patient and the  both tolerated the procedure well and are recovering in labor and delivery room       Jenn Bahena MD  OB/GYN PGY-2  2025  8:45 AM

## 2025-01-29 NOTE — DISCHARGE SUMMARY
Discharge Summary - OB/GYN   Name: Jessica Alejandro 35 y.o. female I MRN: [id number]  Unit/Bed#: -01 I Date of Admission: 2025   Date of Service: 2025 I Hospital Day: 0        ADMISSION  Admission Date: 2025   Admitting Attending: Dr. Radha Cruz MD  Admitting Diagnoses:   Patient Active Problem List   Diagnosis    H/O gestational diabetes in prior pregnancy, currently pregnant    Intractable migraine with status migrainosus    Thrombocytosis    Easy bruisability    Fatigue    Vitamin E deficiency    Vitamin K deficiency    Celiac disease    Vitamin deficiency    AMA (advanced maternal age) multigravida 35+, third trimester    38 weeks gestation of pregnancy    Diet controlled gestational diabetes mellitus (GDM) in third trimester    Family planning    Prenatal care in third trimester    Uterine contractions    Normal labor       DELIVERY  Delivery Method: Vaginal, Spontaneous   Delivery Date and Time: 2025 4:06 AM  Delivery Attending: Radha Cruz    DISCHARGE  Discharge Date: 25  Discharge Attending: Dr. Radha Cruz MD  Discharge Diagnosis:   Same, Delivered    Clinical course: Admission to Delivery  Jessica Alejandro is a 35 y.o.  who was admitted at 38w3d for labor.  She was 8/90/-1 on initial SVE. She had artifical rupture of membranes with clear fluid and quickly progressed to complete and began pushing       Delivery  Route of Delivery: Vaginal, Spontaneous    Anesthesia: none  QBL: Non-Surgical QBL (mL): 162        Delivery: Vaginal, Spontaneous at 2025 4:06 AM  Laceration: Perineal:   Repaired?   Not required    Baby's Weight: 3655 g (8 lb 0.9 oz); 128.93    Apgar scores: 8  and 9  at 1 and 5 minutes, respectively      Clinical Course: Post-Delivery:  The post delivery course was unremarkable.    On the day of discharge, the patient was ambulating, voiding spontaneously, tolerating oral intake, and hemodynamically stable. She was able to reasonably perform all  ADLs. She had appropriate bowel function. Pain was well-controlled. She was discharged home on postpartum/postop day #2 without complications. Patient was instructed to follow up with her OB as an outpatient and was given appropriate warnings to call her provider with problems or concerns.    Pertinent lab findings included:   Blood type A positive.     Last three Hgb values:  Lab Results   Component Value Date    HGB 11.4 (L) 2025    HGB 12.3 2025    HGB 11.0 (L) 2024        Problem-specific follow-up plans included the following:         Discharge med list:  Contraception: vasectomy     Medication List      CONTINUE taking these medications     OneTouch Delica Plus [id number] Misc; Test 4 times daily   OneTouch Verio Flex System w/Device Kit; Test 4 times daily     ASK your doctor about these medications     famotidine 20 mg tablet; Commonly known as: PEPCID; Take 1 tablet (20 mg   total) by mouth daily   metoclopramide 10 mg tablet; Commonly known as: REGLAN; take 1 tablet by   mouth every 8 hours if needed for migraines   * ondansetron 4 mg tablet; Commonly known as: Zofran; Take 1 tablet (4   mg total) by mouth every 8 (eight) hours as needed for nausea or vomiting   for up to 15 doses IGNORE PREVIOUS- ZOFRAN ODT 2 TABLETS DISSOLVE IN MOUTH   EVERY 8 HRS AS NEEDED   * ondansetron 4 mg tablet; Commonly known as: Zofran; Take 2 tablets (8   mg total) by mouth every 8 (eight) hours as needed for nausea or vomiting   for up to 15 doses IGNORE PREVIOUS- ZOFRAN ODT 2 TABLETS DISSOLVE IN THE   MOUTH  EVERY 8 HRS AS NEEDED FOR NAUSEA/ VOMITING   OneTouch Verio test strip; Generic drug: glucose blood; Test 4 times   daily   PRE- PO  * This list has 2 medication(s) that are the same as other medications   prescribed for you. Read the directions carefully, and ask your doctor or   other care provider to review them with you.       Condition at discharge:   good     Disposition:   Home    Planned  Readmission:   No

## 2025-01-29 NOTE — OB LABOR/OXYTOCIN SAFETY PROGRESS
Labor Progress Note - Jessica Alejandro 35 y.o. female MRN: [id number]    Unit/Bed#: -01 Encounter: [id number]                Cervical Dilation: 9        Cervical Effacement: 90  Fetal Station: 0        FHR Category: 2               Vital Signs:   Vitals:    01/29/25 0328   BP: 118/67   Pulse: 88   Resp: 18   Temp: 98.4 °F (36.9 °C)       Notes/comments:   SVE as above, Bulging bag. Intermittent variables though strip somewhat discontinuous due to patient movement.      Jenn Bahena MD 1/29/2025 3:40 AM

## 2025-01-30 PROCEDURE — 99024 POSTOP FOLLOW-UP VISIT: CPT | Performed by: OBSTETRICS & GYNECOLOGY

## 2025-01-30 RX ADMIN — IBUPROFEN 600 MG: 600 TABLET, FILM COATED ORAL at 18:02

## 2025-01-30 RX ADMIN — ACETAMINOPHEN 650 MG: 325 TABLET, FILM COATED ORAL at 04:05

## 2025-01-30 RX ADMIN — DOCUSATE SODIUM 100 MG: 100 CAPSULE, LIQUID FILLED ORAL at 18:02

## 2025-01-30 RX ADMIN — ACETAMINOPHEN 650 MG: 325 TABLET, FILM COATED ORAL at 18:02

## 2025-01-30 RX ADMIN — DOCUSATE SODIUM 100 MG: 100 CAPSULE, LIQUID FILLED ORAL at 08:52

## 2025-01-30 RX ADMIN — IBUPROFEN 600 MG: 600 TABLET, FILM COATED ORAL at 06:03

## 2025-01-30 NOTE — UTILIZATION REVIEW
"NOTIFICATION OF INPATIENT ADMISSION   MATERNITY/DELIVERY AUTHORIZATION REQUEST   SERVICING FACILITY:   [facility]  Parent Child Health - L&D, [place], NICU  [address] [place] [address]  Tax ID: [id number]  NPI: [id number]   ATTENDING PROVIDER:  Attending Name and NPI#: Radha Cruz Md [[id number]]  Address: [address] [place] [address]  Phone: [phone]   ADMISSION INFORMATION:  Place of Service: Inpatient [facility]  Place of Service Code: 21  Inpatient Admission Date/Time: 25  3:08 AM  Discharge Date/Time: No discharge date for patient encounter.  Admitting Diagnosis Code/Description:  Pregnant [Z34.90]  38 weeks gestation of pregnancy [Z3A.38]  Uterine contractions during pregnancy [O47.9]  Encounter for full-term uncomplicated delivery [O80]     Mother: Jessica Alejandro 1990 Estimated Date of Delivery: 25  Delivering clinician: Radha Cruz   OB History          6    Para   4    Term   4            AB   2    Living   4         SAB   2    IAB   0    Ectopic   0    Multiple   0    Live Births   4           Obstetric Comments   H/o GDM  CF and SMA (neg) 20              Name & MRN:   Information for the patient's :  Flavia, Baby Boy (Jessica) [[id number]]   [facility] Delivery Information:  Sex: male  Delivered 2025 4:06 AM by Vaginal, Spontaneous; Gestational Age: 38w3d     Measurements:  Weight: 8 lb 0.9 oz (3655 g);  Height: 20\"    APGAR 1 minute 5 minutes 10 minutes   Totals: 8 9       UTILIZATION REVIEW CONTACT:  Maggie Santos, Utilization   Network Utilization Review Department  Phone: [phone]  Fax [phone]  Email: [email or web address]  Contact for approvals/pending authorizations, clinical reviews, and discharge.     PHYSICIAN ADVISORY SERVICES:  Medical Necessity Denial & Cfna-fl-Zsae Review  Phone: [phone]  Fax: [phone]  Email: " [email or web address]     DISCHARGE SUPPORT TEAM:  For Patients Discharge Needs & Updates  Phone: [phone] opt. 2 Fax: [phone]  Email: [email or web address]

## 2025-01-30 NOTE — PLAN OF CARE
Problem: PAIN - ADULT  Goal: Verbalizes/displays adequate comfort level or baseline comfort level  Description: Interventions:  - Encourage patient to monitor pain and request assistance  - Assess pain using appropriate pain scale  - Administer analgesics based on type and severity of pain and evaluate response  - Implement non-pharmacological measures as appropriate and evaluate response  - Consider cultural and social influences on pain and pain management  - Notify physician/advanced practitioner if interventions unsuccessful or patient reports new pain  Outcome: Progressing     Problem: INFECTION - ADULT  Goal: Absence or prevention of progression during hospitalization  Description: INTERVENTIONS:  - Assess and monitor for signs and symptoms of infection  - Monitor lab/diagnostic results  - Monitor all insertion sites, i.e. indwelling lines, tubes, and drains  - Monitor endotracheal if appropriate and nasal secretions for changes in amount and color  - [place] appropriate cooling/warming therapies per order  - Administer medications as ordered  - Instruct and encourage patient and family to use good hand hygiene technique  - Identify and instruct in appropriate isolation precautions for identified infection/condition  Outcome: Progressing  Goal: Absence of fever/infection during neutropenic period  Description: INTERVENTIONS:  - Monitor WBC    Outcome: Progressing     Problem: SAFETY ADULT  Goal: Patient will remain free of falls  Description: INTERVENTIONS:  - Educate patient/family on patient safety including physical limitations  - Instruct patient to call for assistance with activity   - Consult OT/PT to assist with strengthening/mobility   - Keep Call bell within reach  - Keep bed low and locked with side rails adjusted as appropriate  - Keep care items and personal belongings within reach  - Initiate and maintain comfort rounds  - Make Fall Risk Sign visible to staff  - Offer Toileting every PRN  Hours, in advance of need  Outcome: Progressing  Goal: Maintain or return to baseline ADL function  Description: INTERVENTIONS:  -  Assess patient's ability to carry out ADLs; assess patient's baseline for ADL function and identify physical deficits which impact ability to perform ADLs (bathing, care of mouth/teeth, toileting, grooming, dressing, etc.)  - Assess/evaluate cause of self-care deficits   - Assess range of motion  - Assess patient's mobility; develop plan if impaired  - Assess patient's need for assistive devices and provide as appropriate  - Encourage maximum independence but intervene and supervise when necessary  - Involve family in performance of ADLs  - Assess for home care needs following discharge   - Consider OT consult to assist with ADL evaluation and planning for discharge  - Provide patient education as appropriate  Outcome: Progressing  Goal: Maintains/Returns to pre admission functional level  Description: INTERVENTIONS:  - Perform AM-PAC 6 Click Basic Mobility/ Daily Activity assessment daily.  - Set and communicate daily mobility goal to care team and patient/family/caregiver.   - Collaborate with rehabilitation services on mobility goals if consulted  - Perform Range of Motion PRN times a day.  - Reposition patient every PRN hours.  - Dangle patient PRN times a day  - Stand patient PRN times a day  - Ambulate patient PRN times a day  - Out of bed to chair PRN times a day   - Out of bed for meals PRN times a day  - Out of bed for toileting  - Record patient progress and toleration of activity level   Outcome: Progressing     Problem: DISCHARGE PLANNING  Goal: Discharge to home or other facility with appropriate resources  Description: INTERVENTIONS:  - Identify barriers to discharge w/patient and caregiver  - Arrange for needed discharge resources and transportation as appropriate  - Identify discharge learning needs (meds, wound care, etc.)  - Arrange for interpretive services to assist  at discharge as needed  - Refer to Case Management Department for coordinating discharge planning if the patient needs post-hospital services based on physician/advanced practitioner order or complex needs related to functional status, cognitive ability, or social support system  Outcome: Progressing     Problem: POSTPARTUM  Goal: Experiences normal postpartum course  Description: INTERVENTIONS:  - Monitor maternal vital signs  - Assess uterine involution and lochia  Outcome: Progressing  Goal: Appropriate maternal -  bonding  Description: INTERVENTIONS:  - Identify family support  - Assess for appropriate maternal/infant bonding   -Encourage maternal/infant bonding opportunities  - Referral to  or  as needed  Outcome: Progressing  Goal: Establishment of infant feeding pattern  Description: INTERVENTIONS:  - Assess breast/bottle feeding  - Refer to lactation as needed  Outcome: Progressing  Goal: Incision(s), wounds(s) or drain site(s) healing without S/S of infection  Description: INTERVENTIONS  - Assess and document dressing, incision, wound bed, drain sites and surrounding tissue  - Provide patient and family education  - Perform skin care/dressing changes every PRN  Outcome: Progressing

## 2025-01-30 NOTE — ASSESSMENT & PLAN NOTE
Continue routine post partum care  Encourage ambulation  Encourage breastfeeding  Anticipate discharge PPD 2

## 2025-01-30 NOTE — PROGRESS NOTES
Progress Note - OB/GYN   Jessica Alejandro 35 y.o. female MRN: [id number]  Unit/Bed#: -01 Encounter: [id number]      Assessment/Plan    Jessica Alejandro is a 35 y.o.  who is PPD 1 s/p  at 38w3d     Diet controlled gestational diabetes mellitus (GDM) in third trimester  Assessment & Plan  Plan for 2h GTT at 6 weeks postpartum    *  (spontaneous vaginal delivery)  Assessment & Plan  Continue routine post partum care  Encourage ambulation  Encourage breastfeeding  Anticipate discharge PPD 1-2             Disposition: Anticipate discharge home postpartum Day #1-2  Barriers to discharge: none     Subjective/Objective     Subjective: Postpartum state    Pain: no  Tolerating PO: yes  Voiding: yes  Flatus: yes  Ambulating: yes  Breastfeeding: Bottle feeding  Chest pain: no  Shortness of breath: no  Leg pain: no  Lochia: wnl    Objective:     Vitals:  Vitals:    25 1608 25 2008 25 0025 25 0400   BP: 123/71 121/75 102/63 106/73   BP Location: Right arm Right arm Right arm Right arm   Pulse: 90 89 88 92   Resp: 18 16 18 18   Temp: 98.1 °F (36.7 °C) 97.8 °F (36.6 °C) 97.7 °F (36.5 °C) 97.7 °F (36.5 °C)   TempSrc: Oral Oral Oral Oral   SpO2: 98% 98% 98% 98%   Weight:       Height:           Physical Exam:   GEN: appears well, alert and oriented x 3, pleasant and cooperative   CV: Regular rate  RESP: non labored breathing  ABDOMEN: soft, no tenderness, no distention, Uterine fundus firm and non-tender, -1 cm below the umbilicus  EXTREMITIES: non-tender  NEURO Alert and oriented to person, place, and time.       Lab Results   Component Value Date    WBC 10.85 (H) 2025    HGB 11.4 (L) 2025    HCT 33.8 (L) 2025    MCV 87 2025     2025         Sweta Simmons MD  Obstetrics & Gynecology  25

## 2025-01-30 NOTE — PLAN OF CARE
Problem: PAIN - ADULT  Goal: Verbalizes/displays adequate comfort level or baseline comfort level  Description: Interventions:  - Encourage patient to monitor pain and request assistance  - Assess pain using appropriate pain scale  - Administer analgesics based on type and severity of pain and evaluate response  - Implement non-pharmacological measures as appropriate and evaluate response  - Consider cultural and social influences on pain and pain management  - Notify physician/advanced practitioner if interventions unsuccessful or patient reports new pain  Outcome: Progressing     Problem: INFECTION - ADULT  Goal: Absence or prevention of progression during hospitalization  Description: INTERVENTIONS:  - Assess and monitor for signs and symptoms of infection  - Monitor lab/diagnostic results  - Monitor all insertion sites, i.e. indwelling lines, tubes, and drains  - Monitor endotracheal if appropriate and nasal secretions for changes in amount and color  - [place] appropriate cooling/warming therapies per order  - Administer medications as ordered  - Instruct and encourage patient and family to use good hand hygiene technique  - Identify and instruct in appropriate isolation precautions for identified infection/condition  Outcome: Progressing  Goal: Absence of fever/infection during neutropenic period  Description: INTERVENTIONS:  - Monitor WBC    Outcome: Progressing     Problem: SAFETY ADULT  Goal: Patient will remain free of falls  Description: INTERVENTIONS:  - Educate patient/family on patient safety including physical limitations  - Instruct patient to call for assistance with activity   - Consult OT/PT to assist with strengthening/mobility   - Keep Call bell within reach  - Keep bed low and locked with side rails adjusted as appropriate  - Keep care items and personal belongings within reach  - Initiate and maintain comfort rounds  - Make Fall Risk Sign visible to staff  - Offer Toileting every PRN  Hours, in advance of need  Outcome: Progressing  Goal: Maintain or return to baseline ADL function  Description: INTERVENTIONS:  -  Assess patient's ability to carry out ADLs; assess patient's baseline for ADL function and identify physical deficits which impact ability to perform ADLs (bathing, care of mouth/teeth, toileting, grooming, dressing, etc.)  - Assess/evaluate cause of self-care deficits   - Assess range of motion  - Assess patient's mobility; develop plan if impaired  - Assess patient's need for assistive devices and provide as appropriate  - Encourage maximum independence but intervene and supervise when necessary  - Involve family in performance of ADLs  - Assess for home care needs following discharge   - Consider OT consult to assist with ADL evaluation and planning for discharge  - Provide patient education as appropriate  Outcome: Progressing  Goal: Maintains/Returns to pre admission functional level  Description: INTERVENTIONS:  - Perform AM-PAC 6 Click Basic Mobility/ Daily Activity assessment daily.  - Set and communicate daily mobility goal to care team and patient/family/caregiver.   - Collaborate with rehabilitation services on mobility goals if consulted  - Perform Range of Motion PRN times a day.  - Reposition patient every PRN hours.  - Dangle patient PRN times a day  - Stand patient PRN times a day  - Ambulate patient PRN times a day  - Out of bed to chair PRN times a day   - Out of bed for meals PRN times a day  - Out of bed for toileting  - Record patient progress and toleration of activity level   Outcome: Progressing     Problem: DISCHARGE PLANNING  Goal: Discharge to home or other facility with appropriate resources  Description: INTERVENTIONS:  - Identify barriers to discharge w/patient and caregiver  - Arrange for needed discharge resources and transportation as appropriate  - Identify discharge learning needs (meds, wound care, etc.)  - Arrange for interpretive services to assist  at discharge as needed  - Refer to Case Management Department for coordinating discharge planning if the patient needs post-hospital services based on physician/advanced practitioner order or complex needs related to functional status, cognitive ability, or social support system  Outcome: Progressing     Problem: POSTPARTUM  Goal: Experiences normal postpartum course  Description: INTERVENTIONS:  - Monitor maternal vital signs  - Assess uterine involution and lochia  Outcome: Progressing  Goal: Appropriate maternal -  bonding  Description: INTERVENTIONS:  - Identify family support  - Assess for appropriate maternal/infant bonding   -Encourage maternal/infant bonding opportunities  - Referral to  or  as needed  Outcome: Progressing  Goal: Establishment of infant feeding pattern  Description: INTERVENTIONS:  - Assess breast/bottle feeding  - Refer to lactation as needed  Outcome: Progressing  Goal: Incision(s), wounds(s) or drain site(s) healing without S/S of infection  Description: INTERVENTIONS  - Assess and document dressing, incision, wound bed, drain sites and surrounding tissue  - Provide patient and family education  - Perform skin care/dressing changes every PRN  Outcome: Progressing

## 2025-01-31 VITALS
BODY MASS INDEX: 33.82 KG/M2 | OXYGEN SATURATION: 98 % | HEIGHT: 65 IN | WEIGHT: 203 LBS | TEMPERATURE: 98.2 F | HEART RATE: 93 BPM | RESPIRATION RATE: 17 BRPM | DIASTOLIC BLOOD PRESSURE: 75 MMHG | SYSTOLIC BLOOD PRESSURE: 112 MMHG

## 2025-01-31 PROCEDURE — 99024 POSTOP FOLLOW-UP VISIT: CPT | Performed by: OBSTETRICS & GYNECOLOGY

## 2025-01-31 RX ORDER — ACETAMINOPHEN 325 MG/1
650 TABLET ORAL EVERY 6 HOURS PRN
Start: 2025-01-31

## 2025-01-31 RX ORDER — BENZOCAINE/MENTHOL 6 MG-10 MG
1 LOZENGE MUCOUS MEMBRANE DAILY PRN
Start: 2025-01-31

## 2025-01-31 RX ORDER — IBUPROFEN 600 MG/1
600 TABLET, FILM COATED ORAL EVERY 6 HOURS PRN
Start: 2025-01-31

## 2025-01-31 RX ADMIN — IBUPROFEN 600 MG: 600 TABLET, FILM COATED ORAL at 00:05

## 2025-01-31 RX ADMIN — IBUPROFEN 600 MG: 600 TABLET, FILM COATED ORAL at 06:36

## 2025-01-31 RX ADMIN — ACETAMINOPHEN 650 MG: 325 TABLET, FILM COATED ORAL at 06:36

## 2025-01-31 RX ADMIN — DOCUSATE SODIUM 100 MG: 100 CAPSULE, LIQUID FILLED ORAL at 08:57

## 2025-01-31 RX ADMIN — ACETAMINOPHEN 650 MG: 325 TABLET, FILM COATED ORAL at 00:05

## 2025-01-31 NOTE — UTILIZATION REVIEW
NOTIFICATION OF ADMISSION DISCHARGE   This is a Notification of Discharge from [facility]. Please be advised that this patient has been discharge from our facility. Below you will find the admission and discharge date and time including the patient’s disposition.   UTILIZATION REVIEW CONTACT:  Maggie Santos  Utilization   Network Utilization Review Department  Phone: [phone] x carefully listen to the prompts. All voicemails are confidential.  Email: [email or web address]     ADMISSION INFORMATION  PRESENTATION DATE: 1/29/2025  2:47 AM  OBERVATION ADMISSION DATE: N/A  INPATIENT ADMISSION DATE: 1/29/25  3:08 AM   DISCHARGE DATE: 1/31/2025 11:40 AM   DISPOSITION:Home/Self Care    Network Utilization Review Department  ATTENTION: Please call with any questions or concerns to [phone] and carefully listen to the prompts so that you are directed to the right person. All voicemails are confidential.   For Discharge needs, contact Care Management DC Support Team at [phone] opt. 2  Send all requests for admission clinical reviews, approved or denied determinations and any other requests to dedicated fax number below belonging to the campus where the patient is receiving treatment. List of dedicated fax numbers for the Facilities:  FACILITY NAME UR FAX NUMBER   ADMISSION DENIALS (Administrative/Medical Necessity) [phone]   DISCHARGE SUPPORT TEAM ([facility]) [phone]   PARENT CHILD HEALTH (Maternity/NICU/Pediatrics) [phone]   [facility] [phone]   General acute hospital [phone]   [facility] [phone]   [facility] [phone]   [facility] [phone]   [facility] [phone]   [facility] [phone]   [facility] [phone]    [facility] [phone]   [facility] [phone]   [facility] [phone]   [facility] [phone]

## 2025-01-31 NOTE — PROGRESS NOTES
Progress Note - OB/GYN   Jessica Alejandro 35 y.o. female MRN: [id number]  Unit/Bed#: -01 Encounter: [id number]      Assessment/Plan    Jessica Alejandro is a 35 y.o.  who is PPD 2 s/p  at 38w3d     Diet controlled gestational diabetes mellitus (GDM) in third trimester  Assessment & Plan  Plan for 2h GTT at 6 weeks postpartum    *  (spontaneous vaginal delivery)  Assessment & Plan  Continue routine post partum care  Encourage ambulation  Encourage breastfeeding  Anticipate discharge PPD 2             Disposition: Anticipate discharge home postpartum Day #2  Barriers to discharge: none     Subjective/Objective     Subjective: Postpartum state    Pain: yes, cramping  Tolerating PO: yes  Voiding: yes  Flatus: yes  Ambulating: yes  Breastfeeding: Bottle feeding  Chest pain: no  Shortness of breath: no  Leg pain: no  Lochia: wnl    Objective:     Vitals:  Vitals:    25 0400 25 0846 25 1601 25 0005   BP: 106/73 118/76 113/67 108/74   BP Location: Right arm Right arm Right arm Right arm   Pulse: 92 (!) 106 79 85   Resp: 18 18 20 17   Temp: 97.7 °F (36.5 °C) 98.2 °F (36.8 °C) 98.2 °F (36.8 °C) 98.1 °F (36.7 °C)   TempSrc: Oral Oral Oral Oral   SpO2: 98% 96% 98% 96%   Weight:       Height:           Physical Exam:   GEN: appears well, alert and oriented x 3, pleasant and cooperative   CV: Regular rate  RESP: non labored breathing  ABDOMEN: soft, no tenderness, no distention, Uterine fundus firm and non-tender, below the umbilicus  EXTREMITIES: non-tender  NEURO Alert and oriented to person, place, and time.       Lab Results   Component Value Date    WBC 10.85 (H) 2025    HGB 11.4 (L) 2025    HCT 33.8 (L) 2025    MCV 87 2025     2025         Sweta Simmons MD  Obstetrics & Gynecology  25

## 2025-01-31 NOTE — PLAN OF CARE
Problem: PAIN - ADULT  Goal: Verbalizes/displays adequate comfort level or baseline comfort level  Description: Interventions:  - Encourage patient to monitor pain and request assistance  - Assess pain using appropriate pain scale  - Administer analgesics based on type and severity of pain and evaluate response  - Implement non-pharmacological measures as appropriate and evaluate response  - Consider cultural and social influences on pain and pain management  - Notify physician/advanced practitioner if interventions unsuccessful or patient reports new pain  Outcome: Progressing     Problem: INFECTION - ADULT  Goal: Absence or prevention of progression during hospitalization  Description: INTERVENTIONS:  - Assess and monitor for signs and symptoms of infection  - Monitor lab/diagnostic results  - Monitor all insertion sites, i.e. indwelling lines, tubes, and drains  - Monitor endotracheal if appropriate and nasal secretions for changes in amount and color  - [place] appropriate cooling/warming therapies per order  - Administer medications as ordered  - Instruct and encourage patient and family to use good hand hygiene technique  - Identify and instruct in appropriate isolation precautions for identified infection/condition  Outcome: Progressing  Goal: Absence of fever/infection during neutropenic period  Description: INTERVENTIONS:  - Monitor WBC    Outcome: Progressing     Problem: SAFETY ADULT  Goal: Patient will remain free of falls  Description: INTERVENTIONS:  - Educate patient/family on patient safety including physical limitations  - Instruct patient to call for assistance with activity   - Consult OT/PT to assist with strengthening/mobility   - Keep Call bell within reach  - Keep bed low and locked with side rails adjusted as appropriate  - Keep care items and personal belongings within reach  - Initiate and maintain comfort rounds  - Make Fall Risk Sign visible to staff  - Offer Toileting every PRN  Hours, in advance of need  Outcome: Progressing  Goal: Maintain or return to baseline ADL function  Description: INTERVENTIONS:  -  Assess patient's ability to carry out ADLs; assess patient's baseline for ADL function and identify physical deficits which impact ability to perform ADLs (bathing, care of mouth/teeth, toileting, grooming, dressing, etc.)  - Assess/evaluate cause of self-care deficits   - Assess range of motion  - Assess patient's mobility; develop plan if impaired  - Assess patient's need for assistive devices and provide as appropriate  - Encourage maximum independence but intervene and supervise when necessary  - Involve family in performance of ADLs  - Assess for home care needs following discharge   - Consider OT consult to assist with ADL evaluation and planning for discharge  - Provide patient education as appropriate  Outcome: Progressing  Goal: Maintains/Returns to pre admission functional level  Description: INTERVENTIONS:  - Perform AM-PAC 6 Click Basic Mobility/ Daily Activity assessment daily.  - Set and communicate daily mobility goal to care team and patient/family/caregiver.   - Collaborate with rehabilitation services on mobility goals if consulted  - Perform Range of Motion PRN times a day.  - Reposition patient every PRN hours.  - Dangle patient PRN times a day  - Stand patient PRN times a day  - Ambulate patient PRN times a day  - Out of bed to chair PRN times a day   - Out of bed for meals PRN times a day  - Out of bed for toileting  - Record patient progress and toleration of activity level   Outcome: Progressing     Problem: DISCHARGE PLANNING  Goal: Discharge to home or other facility with appropriate resources  Description: INTERVENTIONS:  - Identify barriers to discharge w/patient and caregiver  - Arrange for needed discharge resources and transportation as appropriate  - Identify discharge learning needs (meds, wound care, etc.)  - Arrange for interpretive services to assist  at discharge as needed  - Refer to Case Management Department for coordinating discharge planning if the patient needs post-hospital services based on physician/advanced practitioner order or complex needs related to functional status, cognitive ability, or social support system  Outcome: Progressing     Problem: POSTPARTUM  Goal: Experiences normal postpartum course  Description: INTERVENTIONS:  - Monitor maternal vital signs  - Assess uterine involution and lochia  Outcome: Progressing  Goal: Appropriate maternal -  bonding  Description: INTERVENTIONS:  - Identify family support  - Assess for appropriate maternal/infant bonding   -Encourage maternal/infant bonding opportunities  - Referral to  or  as needed  Outcome: Progressing  Goal: Establishment of infant feeding pattern  Description: INTERVENTIONS:  - Assess breast/bottle feeding  - Refer to lactation as needed  Outcome: Progressing  Goal: Incision(s), wounds(s) or drain site(s) healing without S/S of infection  Description: INTERVENTIONS  - Assess and document dressing, incision, wound bed, drain sites and surrounding tissue  - Provide patient and family education  - Perform skin care/dressing changes every PRN  Outcome: Progressing

## 2025-02-03 ENCOUNTER — TELEPHONE (OUTPATIENT)
Dept: OBGYN CLINIC | Facility: CLINIC | Age: 35
End: 2025-02-03

## 2025-02-04 LAB — PLACENTA IN STORAGE: NORMAL

## 2025-02-12 DIAGNOSIS — Z13.1 DIABETES MELLITUS SCREENING: Primary | ICD-10-CM

## 2025-02-12 DIAGNOSIS — Z86.32 HISTORY OF GESTATIONAL DIABETES MELLITUS (GDM), NOT CURRENTLY PREGNANT: ICD-10-CM

## 2025-03-05 NOTE — PROGRESS NOTES
"OB POSTPARTUM VISIT PROGRESS NOTE  Date of Encounter: 3/10/2025    Jessica Alejandro    : 1990  (35 y.o.)  MR: [id number]    Subjective   Jessica is in for her postpartum visit. She is now . She delivered by normal spontaneous vaginal delivery. She's generally doing well, denies current pain or bleeding issues, and has no significant depression issues. She is bottle feeding. We discussed all appropriate contraceptive options and she chooses None. Partner plans vasectomy.   She did call yesterday after passing a golf ball sized clot. She did not notice an increase in bleeding, not soaking a pad > 1/hour. Denies dizziness or other concerning sx. She notes she has not stopped bleeding since delivery. Flow and consistency has varied but has nt stopped. No fevers/chills or odor.     Postpartum Depression: Medium Risk (3/10/2025)    [place]  Depression Scale    • Last EPDS Total Score: 7    • Last EPDS Self Harm Result: Never     Pt feels like she has been isolated at home with a winter baby. She is looking forward to going back to work and thinks this will help her mental status overall.   She denies desire to consider meds/therapy at this point but does agree to keep communication open if she feels her sx do not improve or worsen when she returns to work.         Objective   EXAM:  GENERAL: alert, well appearing, and in no distress.  VITALS: Blood pressure 112/78, height 5' 5\" (1.651 m), weight 82.6 kg (182 lb), last menstrual period 2024, not currently breastfeeding.  BMI: Body mass index is 30.29 kg/m².  NECK/THYROID: Commentno thyromegaly   HEART: RRR  LUNGS: Clear to auscultation.  BACK: No CVAT    BREASTS: Deferred  ABDOMEN: Regular  EXTREMITIES: All normal.  PELVIC:   VULVA: Nml EGBUS. Moderate amount of blood present in vaginal vault.    VAGINA: Normal, no discharge. Introitus well healed, slightly tender.   CERVIX: Normal, no discharge.   UTERUS: Anteverted, NSSC, [place], NT.   RIGHT " ADNEXUM: Nontender, no mass.   LEFT ADNEXUM: Nontender, no mass.   RECTAL: Deferred.    Assessment & Plan   Diagnoses and all orders for this visit:    Irregular bleeding  -     US pelvis complete w transvaginal; Future    Postpartum care and examination  -     US pelvis complete w transvaginal; Future    Will plan pelvic US.   Pt will watch sx and call with worsening bleeding, fevers/chills, etc.   Pt agreeable.   Leah Lorenzana PA-C

## 2025-03-09 ENCOUNTER — NURSE TRIAGE (OUTPATIENT)
Dept: OTHER | Facility: OTHER | Age: 35
End: 2025-03-09

## 2025-03-10 ENCOUNTER — POSTPARTUM VISIT (OUTPATIENT)
Dept: OBGYN CLINIC | Facility: CLINIC | Age: 35
End: 2025-03-10

## 2025-03-10 VITALS
BODY MASS INDEX: 30.32 KG/M2 | HEIGHT: 65 IN | WEIGHT: 182 LBS | DIASTOLIC BLOOD PRESSURE: 78 MMHG | SYSTOLIC BLOOD PRESSURE: 112 MMHG

## 2025-03-10 DIAGNOSIS — N92.6 IRREGULAR BLEEDING: Primary | ICD-10-CM

## 2025-03-10 PROCEDURE — 99024 POSTOP FOLLOW-UP VISIT: CPT | Performed by: PHYSICIAN ASSISTANT

## 2025-03-10 NOTE — TELEPHONE ENCOUNTER
"FOLLOW UP: Patient has an appointment tomorrow at 1 pm    REASON FOR CONVERSATION: Postpartum Complications    SYMPTOMS: intermittent pelvic cramping, passed a hard brown clot slightly smaller then a golf ball, and bright red bleeding    OTHER:  Passed a hard piece of dark drown clot a little smaller then a golf ball that does not resemble other clots. Has not had an increase of bleeding but wanted to make sure that she did not have to come in tonight. Not soaking more then one pad per hour but has been bleeding more this time post partum then other times. Denies dizziness and other symptoms. Messaged provider on call and provider stated it was okay to wait until 1 pm appointment.  Reviewed to call back if bleeding is soaking more then a pad an hour, having fever, feeling dizzy or worse. Reviewed all instructions per protocol.     DISPOSITION: See PCP Within 3 Days      Reason for Disposition   [1] Vaginal bleeding or spotting lasts > 4 weeks AND [2] red or red-brown    Answer Assessment - Initial Assessment Questions  1. ONSET: \"When did the bleeding start?\" \"Describe your bleeding: is it getting worse, staying the same, improving, or stopping and starting?\"        10 minutes ago     2. BLEEDING SEVERITY: \"Describe the bleeding that you are having.\" \"How much bleeding is there?\"         Bleeding is waxed and waning. Started bleeding heavier this morning. Bleeding was red this morning like period blood. Not changing pad more then one per hour. Changing a normal pad every few hours when going to the bathroom. Brownish red color 3/4 the size of a golfball. Bigger then quarter smaller then a golf ball- looks like an organ Small clot that came out that was reddish color and stringy on the end.     3. ABDOMEN PAIN: \"Do you have any pain?\" \"How bad is the pain?\"  (e.g., Scale 0-10; none, mild, moderate, or severe)        Intermittent mild pelvic cramping    4. HORMONES: \"Are you taking any birth control medicines?\" (e.g., " "birth control pills, Depo-Provera)        Denies    5. BLOOD THINNERS: \"Do you take any blood thinners?\" (e.g., aspirin, enoxaparin / Lovenox, warfarin / Coumadin)        Denies    6. OTHER SYMPTOMS: \"Do you have any other symptoms?\" (e.g., fever, chills, dizziness)        Pins in the back of thighs like previous periods. Denies other symptoms. Temperature 99.1 tympanic.     7. DELIVERY DATE: \"When was your delivery date?\" \"Vaginal delivery or ?\"        25    8. BREASTFEEDING: \"Are you breastfeeding?\"        Denies    Protocols used: Postpartum - Vaginal Bleeding and Lochia-Adult-AH    "

## 2025-03-12 ENCOUNTER — HOSPITAL ENCOUNTER (OUTPATIENT)
Dept: RADIOLOGY | Facility: MEDICAL CENTER | Age: 35
Discharge: HOME | End: 2025-03-12
Payer: COMMERCIAL

## 2025-03-12 DIAGNOSIS — N92.6 IRREGULAR BLEEDING: ICD-10-CM

## 2025-03-12 PROCEDURE — 76830 TRANSVAGINAL US NON-OB: CPT

## 2025-03-12 PROCEDURE — 76856 US EXAM PELVIC COMPLETE: CPT

## 2025-03-19 ENCOUNTER — RESULTS FOLLOW-UP (OUTPATIENT)
Dept: OBGYN CLINIC | Facility: CLINIC | Age: 35
End: 2025-03-19

## 2025-07-30 ENCOUNTER — ANNUAL EXAM (OUTPATIENT)
Dept: OBGYN CLINIC | Facility: CLINIC | Age: 35
End: 2025-07-30
Payer: COMMERCIAL

## 2025-07-30 VITALS
WEIGHT: 184 LBS | SYSTOLIC BLOOD PRESSURE: 110 MMHG | BODY MASS INDEX: 30.66 KG/M2 | HEIGHT: 65 IN | DIASTOLIC BLOOD PRESSURE: 76 MMHG

## 2025-07-30 DIAGNOSIS — Z01.419 ENCOUNTER FOR GYNECOLOGICAL EXAMINATION WITHOUT ABNORMAL FINDING: Primary | ICD-10-CM

## 2025-07-30 DIAGNOSIS — N63.20 MASS OF LEFT BREAST, UNSPECIFIED QUADRANT: ICD-10-CM

## 2025-07-30 PROCEDURE — G0145 SCR C/V CYTO,THINLAYER,RESCR: HCPCS | Performed by: OBSTETRICS & GYNECOLOGY

## 2025-07-30 PROCEDURE — G0476 HPV COMBO ASSAY CA SCREEN: HCPCS | Performed by: OBSTETRICS & GYNECOLOGY

## 2025-07-30 PROCEDURE — S0612 ANNUAL GYNECOLOGICAL EXAMINA: HCPCS | Performed by: OBSTETRICS & GYNECOLOGY

## 2025-08-04 LAB
LAB AP GYN PRIMARY INTERPRETATION: NORMAL
Lab: NORMAL